# Patient Record
Sex: FEMALE | Race: WHITE | NOT HISPANIC OR LATINO
[De-identification: names, ages, dates, MRNs, and addresses within clinical notes are randomized per-mention and may not be internally consistent; named-entity substitution may affect disease eponyms.]

---

## 2017-01-03 ENCOUNTER — RESULT REVIEW (OUTPATIENT)
Age: 82
End: 2017-01-03

## 2017-01-04 ENCOUNTER — APPOINTMENT (OUTPATIENT)
Dept: HEMATOLOGY ONCOLOGY | Facility: CLINIC | Age: 82
End: 2017-01-04

## 2017-01-04 VITALS
SYSTOLIC BLOOD PRESSURE: 129 MMHG | WEIGHT: 237.65 LBS | TEMPERATURE: 97.7 F | HEART RATE: 81 BPM | DIASTOLIC BLOOD PRESSURE: 78 MMHG | OXYGEN SATURATION: 95 % | RESPIRATION RATE: 16 BRPM | BODY MASS INDEX: 43.47 KG/M2

## 2017-01-05 LAB
ALBUMIN SERPL ELPH-MCNC: 4 G/DL
ALP BLD-CCNC: 95 U/L
ALT SERPL-CCNC: 26 U/L
ANION GAP SERPL CALC-SCNC: 17 MMOL/L
AST SERPL-CCNC: 37 U/L
BILIRUB SERPL-MCNC: 0.4 MG/DL
BUN SERPL-MCNC: 16 MG/DL
CALCIUM SERPL-MCNC: 10.2 MG/DL
CANCER AG125 SERPL-ACNC: 22 U/ML
CHLORIDE SERPL-SCNC: 99 MMOL/L
CO2 SERPL-SCNC: 26 MMOL/L
CREAT SERPL-MCNC: 0.9 MG/DL
GLUCOSE SERPL-MCNC: 105 MG/DL
POTASSIUM SERPL-SCNC: 4.5 MMOL/L
PROT SERPL-MCNC: 7.3 G/DL
SODIUM SERPL-SCNC: 142 MMOL/L

## 2017-01-06 LAB — CEA SERPL-MCNC: 1.6 NG/ML

## 2017-03-07 ENCOUNTER — OUTPATIENT (OUTPATIENT)
Dept: OUTPATIENT SERVICES | Facility: HOSPITAL | Age: 82
LOS: 1 days | Discharge: HOME | End: 2017-03-07

## 2017-03-07 DIAGNOSIS — C54.1 MALIGNANT NEOPLASM OF ENDOMETRIUM: ICD-10-CM

## 2017-03-08 ENCOUNTER — RESULT REVIEW (OUTPATIENT)
Age: 82
End: 2017-03-08

## 2017-03-09 ENCOUNTER — APPOINTMENT (OUTPATIENT)
Dept: HEMATOLOGY ONCOLOGY | Facility: CLINIC | Age: 82
End: 2017-03-09

## 2017-03-09 VITALS
BODY MASS INDEX: 43.95 KG/M2 | WEIGHT: 240.3 LBS | HEART RATE: 75 BPM | SYSTOLIC BLOOD PRESSURE: 140 MMHG | DIASTOLIC BLOOD PRESSURE: 78 MMHG | OXYGEN SATURATION: 97 % | RESPIRATION RATE: 17 BRPM | TEMPERATURE: 97.6 F

## 2017-03-09 LAB
ALBUMIN SERPL ELPH-MCNC: 4.1 G/DL
ALP BLD-CCNC: 91 U/L
ALT SERPL-CCNC: 27 U/L
ANION GAP SERPL CALC-SCNC: 14 MMOL/L
AST SERPL-CCNC: 25 U/L
BILIRUB SERPL-MCNC: 0.3 MG/DL
BUN SERPL-MCNC: 20 MG/DL
CALCIUM SERPL-MCNC: 10.3 MG/DL
CHLORIDE SERPL-SCNC: 103 MMOL/L
CO2 SERPL-SCNC: 24 MMOL/L
CREAT SERPL-MCNC: 0.94 MG/DL
GLUCOSE SERPL-MCNC: 121 MG/DL
HCT VFR BLD CALC: 40.8 % — SIGNIFICANT CHANGE UP (ref 34.5–45)
HGB BLD-MCNC: 13.7 G/DL — SIGNIFICANT CHANGE UP (ref 11.5–15.5)
MCHC RBC-ENTMCNC: 30.8 PG — SIGNIFICANT CHANGE UP (ref 27–34)
MCHC RBC-ENTMCNC: 33.5 G/DL — SIGNIFICANT CHANGE UP (ref 32–36)
MCV RBC AUTO: 91.9 FL — SIGNIFICANT CHANGE UP (ref 80–100)
PLATELET # BLD AUTO: 279 K/UL — SIGNIFICANT CHANGE UP (ref 150–400)
POTASSIUM SERPL-SCNC: 4.5 MMOL/L
PROT SERPL-MCNC: 7.1 G/DL
RBC # BLD: 4.44 M/UL — SIGNIFICANT CHANGE UP (ref 3.8–5.2)
RBC # FLD: 12.3 % — SIGNIFICANT CHANGE UP (ref 10.3–14.5)
SODIUM SERPL-SCNC: 142 MMOL/L
WBC # BLD: 6.7 K/UL — SIGNIFICANT CHANGE UP (ref 3.8–10.5)
WBC # FLD AUTO: 6.7 K/UL — SIGNIFICANT CHANGE UP (ref 3.8–10.5)

## 2017-03-10 LAB
CANCER AG125 SERPL-ACNC: 14 U/ML
CEA SERPL-MCNC: 1.6 NG/ML

## 2017-03-20 ENCOUNTER — APPOINTMENT (OUTPATIENT)
Dept: THORACIC SURGERY | Facility: CLINIC | Age: 82
End: 2017-03-20

## 2017-03-28 ENCOUNTER — APPOINTMENT (OUTPATIENT)
Dept: GYNECOLOGIC ONCOLOGY | Facility: CLINIC | Age: 82
End: 2017-03-28

## 2017-03-30 ENCOUNTER — RX RENEWAL (OUTPATIENT)
Age: 82
End: 2017-03-30

## 2017-04-04 ENCOUNTER — APPOINTMENT (OUTPATIENT)
Dept: CT IMAGING | Facility: IMAGING CENTER | Age: 82
End: 2017-04-04

## 2017-04-10 ENCOUNTER — FORM ENCOUNTER (OUTPATIENT)
Age: 82
End: 2017-04-10

## 2017-04-11 ENCOUNTER — OUTPATIENT (OUTPATIENT)
Dept: OUTPATIENT SERVICES | Facility: HOSPITAL | Age: 82
LOS: 1 days | End: 2017-04-11
Payer: MEDICARE

## 2017-04-11 ENCOUNTER — APPOINTMENT (OUTPATIENT)
Dept: CT IMAGING | Facility: IMAGING CENTER | Age: 82
End: 2017-04-11

## 2017-04-11 DIAGNOSIS — Z00.8 ENCOUNTER FOR OTHER GENERAL EXAMINATION: ICD-10-CM

## 2017-04-11 PROCEDURE — 74177 CT ABD & PELVIS W/CONTRAST: CPT

## 2017-04-11 PROCEDURE — 71260 CT THORAX DX C+: CPT

## 2017-04-12 ENCOUNTER — CLINICAL ADVICE (OUTPATIENT)
Age: 82
End: 2017-04-12

## 2017-05-10 ENCOUNTER — APPOINTMENT (OUTPATIENT)
Dept: THORACIC SURGERY | Facility: CLINIC | Age: 82
End: 2017-05-10

## 2017-05-11 VITALS
BODY MASS INDEX: 43.9 KG/M2 | RESPIRATION RATE: 16 BRPM | DIASTOLIC BLOOD PRESSURE: 68 MMHG | OXYGEN SATURATION: 98 % | HEART RATE: 82 BPM | SYSTOLIC BLOOD PRESSURE: 120 MMHG | WEIGHT: 240 LBS

## 2017-06-08 ENCOUNTER — APPOINTMENT (OUTPATIENT)
Dept: NEUROLOGY | Facility: CLINIC | Age: 82
End: 2017-06-08

## 2017-06-08 VITALS
SYSTOLIC BLOOD PRESSURE: 149 MMHG | DIASTOLIC BLOOD PRESSURE: 82 MMHG | WEIGHT: 240 LBS | HEART RATE: 88 BPM | BODY MASS INDEX: 44.16 KG/M2 | HEIGHT: 62 IN

## 2017-06-08 RX ORDER — FLUTICASONE PROPIONATE 50 UG/1
50 SPRAY, METERED NASAL
Qty: 16 | Refills: 0 | Status: ACTIVE | COMMUNITY
Start: 2017-01-26

## 2017-06-08 RX ORDER — MONTELUKAST 10 MG/1
10 TABLET, FILM COATED ORAL
Qty: 30 | Refills: 0 | Status: ACTIVE | COMMUNITY
Start: 2017-01-12

## 2017-06-08 RX ORDER — CHOLECALCIFEROL (VITAMIN D3) 50 MCG
50 MCG TABLET ORAL
Qty: 90 | Refills: 0 | Status: ACTIVE | COMMUNITY
Start: 2017-02-02

## 2017-06-08 RX ORDER — ZOLPIDEM TARTRATE 10 MG/1
10 TABLET ORAL
Qty: 30 | Refills: 0 | Status: ACTIVE | COMMUNITY
Start: 2017-01-26

## 2017-06-08 RX ORDER — AZITHROMYCIN 500 MG/1
500 TABLET, FILM COATED ORAL
Qty: 3 | Refills: 0 | Status: ACTIVE | COMMUNITY
Start: 2017-01-12

## 2017-06-12 ENCOUNTER — OUTPATIENT (OUTPATIENT)
Dept: OUTPATIENT SERVICES | Facility: HOSPITAL | Age: 82
LOS: 1 days | Discharge: HOME | End: 2017-06-12

## 2017-06-12 DIAGNOSIS — C54.1 MALIGNANT NEOPLASM OF ENDOMETRIUM: ICD-10-CM

## 2017-06-13 ENCOUNTER — APPOINTMENT (OUTPATIENT)
Dept: GYNECOLOGIC ONCOLOGY | Facility: CLINIC | Age: 82
End: 2017-06-13

## 2017-06-13 ENCOUNTER — APPOINTMENT (OUTPATIENT)
Dept: HEMATOLOGY ONCOLOGY | Facility: CLINIC | Age: 82
End: 2017-06-13

## 2017-06-13 ENCOUNTER — RESULT REVIEW (OUTPATIENT)
Age: 82
End: 2017-06-13

## 2017-06-13 VITALS
WEIGHT: 240 LBS | BODY MASS INDEX: 43.9 KG/M2 | SYSTOLIC BLOOD PRESSURE: 129 MMHG | RESPIRATION RATE: 16 BRPM | OXYGEN SATURATION: 96 % | DIASTOLIC BLOOD PRESSURE: 83 MMHG | TEMPERATURE: 97.5 F | HEART RATE: 82 BPM

## 2017-06-13 LAB
HCT VFR BLD CALC: 41.2 % — SIGNIFICANT CHANGE UP (ref 34.5–45)
HGB BLD-MCNC: 13.6 G/DL — SIGNIFICANT CHANGE UP (ref 11.5–15.5)
MCHC RBC-ENTMCNC: 30.4 PG — SIGNIFICANT CHANGE UP (ref 27–34)
MCHC RBC-ENTMCNC: 32.9 G/DL — SIGNIFICANT CHANGE UP (ref 32–36)
MCV RBC AUTO: 92.4 FL — SIGNIFICANT CHANGE UP (ref 80–100)
PLATELET # BLD AUTO: 271 K/UL — SIGNIFICANT CHANGE UP (ref 150–400)
RBC # BLD: 4.46 M/UL — SIGNIFICANT CHANGE UP (ref 3.8–5.2)
RBC # FLD: 12 % — SIGNIFICANT CHANGE UP (ref 10.3–14.5)
WBC # BLD: 7.8 K/UL — SIGNIFICANT CHANGE UP (ref 3.8–10.5)
WBC # FLD AUTO: 7.8 K/UL — SIGNIFICANT CHANGE UP (ref 3.8–10.5)

## 2017-06-14 LAB
ALBUMIN SERPL ELPH-MCNC: 4.1 G/DL
ALP BLD-CCNC: 96 U/L
ALT SERPL-CCNC: 29 U/L
ANION GAP SERPL CALC-SCNC: 21 MMOL/L
AST SERPL-CCNC: 30 U/L
BILIRUB SERPL-MCNC: 0.2 MG/DL
BUN SERPL-MCNC: 16 MG/DL
CALCIUM SERPL-MCNC: 10.2 MG/DL
CANCER AG125 SERPL-ACNC: 14 U/ML
CEA SERPL-MCNC: 1.8 NG/ML
CHLORIDE SERPL-SCNC: 104 MMOL/L
CO2 SERPL-SCNC: 21 MMOL/L
CREAT SERPL-MCNC: 0.94 MG/DL
GLUCOSE SERPL-MCNC: 110 MG/DL
POTASSIUM SERPL-SCNC: 4.5 MMOL/L
PROT SERPL-MCNC: 7.2 G/DL
SODIUM SERPL-SCNC: 146 MMOL/L

## 2017-07-06 ENCOUNTER — APPOINTMENT (OUTPATIENT)
Dept: NEUROLOGY | Facility: CLINIC | Age: 82
End: 2017-07-06

## 2017-07-18 ENCOUNTER — OUTPATIENT (OUTPATIENT)
Dept: OUTPATIENT SERVICES | Facility: HOSPITAL | Age: 82
LOS: 1 days | End: 2017-07-18
Payer: MEDICARE

## 2017-07-18 ENCOUNTER — APPOINTMENT (OUTPATIENT)
Dept: CT IMAGING | Facility: IMAGING CENTER | Age: 82
End: 2017-07-18

## 2017-07-18 DIAGNOSIS — Z00.8 ENCOUNTER FOR OTHER GENERAL EXAMINATION: ICD-10-CM

## 2017-07-18 PROCEDURE — 71260 CT THORAX DX C+: CPT

## 2017-07-18 PROCEDURE — 74177 CT ABD & PELVIS W/CONTRAST: CPT

## 2017-08-24 ENCOUNTER — OUTPATIENT (OUTPATIENT)
Dept: OUTPATIENT SERVICES | Facility: HOSPITAL | Age: 82
LOS: 1 days | Discharge: HOME | End: 2017-08-24

## 2017-08-24 DIAGNOSIS — C54.1 MALIGNANT NEOPLASM OF ENDOMETRIUM: ICD-10-CM

## 2017-08-31 ENCOUNTER — APPOINTMENT (OUTPATIENT)
Dept: HEMATOLOGY ONCOLOGY | Facility: CLINIC | Age: 82
End: 2017-08-31
Payer: MEDICARE

## 2017-08-31 ENCOUNTER — RESULT REVIEW (OUTPATIENT)
Age: 82
End: 2017-08-31

## 2017-08-31 VITALS
OXYGEN SATURATION: 95 % | TEMPERATURE: 97.7 F | HEART RATE: 88 BPM | BODY MASS INDEX: 46.17 KG/M2 | RESPIRATION RATE: 16 BRPM | WEIGHT: 252.43 LBS | DIASTOLIC BLOOD PRESSURE: 83 MMHG | SYSTOLIC BLOOD PRESSURE: 153 MMHG

## 2017-08-31 LAB
HCT VFR BLD CALC: 42.8 % — SIGNIFICANT CHANGE UP (ref 34.5–45)
HGB BLD-MCNC: 14.4 G/DL — SIGNIFICANT CHANGE UP (ref 11.5–15.5)
MCHC RBC-ENTMCNC: 31 PG — SIGNIFICANT CHANGE UP (ref 27–34)
MCHC RBC-ENTMCNC: 33.7 G/DL — SIGNIFICANT CHANGE UP (ref 32–36)
MCV RBC AUTO: 91.8 FL — SIGNIFICANT CHANGE UP (ref 80–100)
PLATELET # BLD AUTO: 266 K/UL — SIGNIFICANT CHANGE UP (ref 150–400)
RBC # BLD: 4.66 M/UL — SIGNIFICANT CHANGE UP (ref 3.8–5.2)
RBC # FLD: 12.3 % — SIGNIFICANT CHANGE UP (ref 10.3–14.5)
WBC # BLD: 8.3 K/UL — SIGNIFICANT CHANGE UP (ref 3.8–10.5)
WBC # FLD AUTO: 8.3 K/UL — SIGNIFICANT CHANGE UP (ref 3.8–10.5)

## 2017-08-31 PROCEDURE — 99214 OFFICE O/P EST MOD 30 MIN: CPT

## 2017-09-01 LAB
ALBUMIN SERPL ELPH-MCNC: 4.2 G/DL
ALP BLD-CCNC: 104 U/L
ALT SERPL-CCNC: 36 U/L
ANION GAP SERPL CALC-SCNC: 16 MMOL/L
AST SERPL-CCNC: 38 U/L
BILIRUB SERPL-MCNC: 0.3 MG/DL
BUN SERPL-MCNC: 16 MG/DL
CALCIUM SERPL-MCNC: 9.9 MG/DL
CANCER AG125 SERPL-ACNC: 15 U/ML
CEA SERPL-MCNC: 1.8 NG/ML
CHLORIDE SERPL-SCNC: 101 MMOL/L
CO2 SERPL-SCNC: 28 MMOL/L
CREAT SERPL-MCNC: 0.85 MG/DL
GLUCOSE SERPL-MCNC: 107 MG/DL
POTASSIUM SERPL-SCNC: 4.2 MMOL/L
PROT SERPL-MCNC: 7.8 G/DL
SODIUM SERPL-SCNC: 145 MMOL/L

## 2017-09-28 ENCOUNTER — APPOINTMENT (OUTPATIENT)
Dept: NEUROLOGY | Facility: CLINIC | Age: 82
End: 2017-09-28
Payer: MEDICARE

## 2017-09-28 VITALS
SYSTOLIC BLOOD PRESSURE: 193 MMHG | DIASTOLIC BLOOD PRESSURE: 78 MMHG | BODY MASS INDEX: 46.56 KG/M2 | WEIGHT: 253 LBS | HEIGHT: 62 IN | HEART RATE: 96 BPM

## 2017-09-28 PROCEDURE — 99214 OFFICE O/P EST MOD 30 MIN: CPT

## 2017-09-28 RX ORDER — PANTOPRAZOLE 40 MG/1
40 TABLET, DELAYED RELEASE ORAL
Qty: 30 | Refills: 0 | Status: ACTIVE | COMMUNITY
Start: 2017-09-19

## 2017-10-23 ENCOUNTER — OTHER (OUTPATIENT)
Age: 82
End: 2017-10-23

## 2017-10-31 ENCOUNTER — APPOINTMENT (OUTPATIENT)
Dept: CT IMAGING | Facility: IMAGING CENTER | Age: 82
End: 2017-10-31

## 2017-11-07 RX ORDER — DICLOFENAC SODIUM 75 MG/1
75 TABLET, DELAYED RELEASE ORAL
Qty: 90 | Refills: 0 | Status: ACTIVE | COMMUNITY
Start: 2017-10-24 | End: 1900-01-01

## 2017-11-10 ENCOUNTER — APPOINTMENT (OUTPATIENT)
Dept: GYNECOLOGIC ONCOLOGY | Facility: CLINIC | Age: 82
End: 2017-11-10

## 2017-11-10 ENCOUNTER — FORM ENCOUNTER (OUTPATIENT)
Age: 82
End: 2017-11-10

## 2017-11-11 ENCOUNTER — APPOINTMENT (OUTPATIENT)
Dept: MRI IMAGING | Facility: IMAGING CENTER | Age: 82
End: 2017-11-11
Payer: MEDICARE

## 2017-11-11 ENCOUNTER — OUTPATIENT (OUTPATIENT)
Dept: OUTPATIENT SERVICES | Facility: HOSPITAL | Age: 82
LOS: 1 days | End: 2017-11-11
Payer: MEDICARE

## 2017-11-11 DIAGNOSIS — M54.5 LOW BACK PAIN: ICD-10-CM

## 2017-11-11 DIAGNOSIS — R26.9 UNSPECIFIED ABNORMALITIES OF GAIT AND MOBILITY: ICD-10-CM

## 2017-11-11 PROCEDURE — 72148 MRI LUMBAR SPINE W/O DYE: CPT | Mod: 26

## 2017-11-11 PROCEDURE — 72148 MRI LUMBAR SPINE W/O DYE: CPT

## 2017-11-11 PROCEDURE — A9585: CPT

## 2017-11-21 ENCOUNTER — APPOINTMENT (OUTPATIENT)
Dept: CT IMAGING | Facility: IMAGING CENTER | Age: 82
End: 2017-11-21
Payer: MEDICARE

## 2017-11-21 ENCOUNTER — OUTPATIENT (OUTPATIENT)
Dept: OUTPATIENT SERVICES | Facility: HOSPITAL | Age: 82
LOS: 1 days | Discharge: HOME | End: 2017-11-21

## 2017-11-21 DIAGNOSIS — C54.1 MALIGNANT NEOPLASM OF ENDOMETRIUM: ICD-10-CM

## 2017-11-28 ENCOUNTER — APPOINTMENT (OUTPATIENT)
Dept: HEMATOLOGY ONCOLOGY | Facility: CLINIC | Age: 82
End: 2017-11-28

## 2017-11-29 ENCOUNTER — FORM ENCOUNTER (OUTPATIENT)
Age: 82
End: 2017-11-29

## 2017-11-30 ENCOUNTER — OUTPATIENT (OUTPATIENT)
Dept: OUTPATIENT SERVICES | Facility: HOSPITAL | Age: 82
LOS: 1 days | End: 2017-11-30
Payer: MEDICARE

## 2017-11-30 ENCOUNTER — APPOINTMENT (OUTPATIENT)
Dept: CT IMAGING | Facility: IMAGING CENTER | Age: 82
End: 2017-11-30
Payer: MEDICARE

## 2017-11-30 DIAGNOSIS — C78.02 SECONDARY MALIGNANT NEOPLASM OF LEFT LUNG: ICD-10-CM

## 2017-11-30 PROCEDURE — 74177 CT ABD & PELVIS W/CONTRAST: CPT

## 2017-11-30 PROCEDURE — 74177 CT ABD & PELVIS W/CONTRAST: CPT | Mod: 26

## 2017-11-30 PROCEDURE — 71260 CT THORAX DX C+: CPT | Mod: 26

## 2017-11-30 PROCEDURE — 71260 CT THORAX DX C+: CPT

## 2017-12-05 ENCOUNTER — APPOINTMENT (OUTPATIENT)
Dept: HEMATOLOGY ONCOLOGY | Facility: CLINIC | Age: 82
End: 2017-12-05
Payer: MEDICARE

## 2017-12-05 ENCOUNTER — RESULT REVIEW (OUTPATIENT)
Age: 82
End: 2017-12-05

## 2017-12-05 VITALS
TEMPERATURE: 98.2 F | HEART RATE: 89 BPM | RESPIRATION RATE: 16 BRPM | DIASTOLIC BLOOD PRESSURE: 82 MMHG | SYSTOLIC BLOOD PRESSURE: 137 MMHG | OXYGEN SATURATION: 95 %

## 2017-12-05 DIAGNOSIS — I25.10 ATHEROSCLEROTIC HEART DISEASE OF NATIVE CORONARY ARTERY WITHOUT ANGINA PECTORIS: ICD-10-CM

## 2017-12-05 DIAGNOSIS — C54.1 MALIGNANT NEOPLASM OF ENDOMETRIUM: ICD-10-CM

## 2017-12-05 LAB
HCT VFR BLD CALC: 41.6 % — SIGNIFICANT CHANGE UP (ref 34.5–45)
HGB BLD-MCNC: 14.2 G/DL — SIGNIFICANT CHANGE UP (ref 11.5–15.5)
MCHC RBC-ENTMCNC: 31 PG — SIGNIFICANT CHANGE UP (ref 27–34)
MCHC RBC-ENTMCNC: 34.1 G/DL — SIGNIFICANT CHANGE UP (ref 32–36)
MCV RBC AUTO: 91.1 FL — SIGNIFICANT CHANGE UP (ref 80–100)
PLATELET # BLD AUTO: 254 K/UL — SIGNIFICANT CHANGE UP (ref 150–400)
RBC # BLD: 4.57 M/UL — SIGNIFICANT CHANGE UP (ref 3.8–5.2)
RBC # FLD: 12.4 % — SIGNIFICANT CHANGE UP (ref 10.3–14.5)
WBC # BLD: 8.5 K/UL — SIGNIFICANT CHANGE UP (ref 3.8–10.5)
WBC # FLD AUTO: 8.5 K/UL — SIGNIFICANT CHANGE UP (ref 3.8–10.5)

## 2017-12-05 PROCEDURE — 99214 OFFICE O/P EST MOD 30 MIN: CPT

## 2017-12-06 LAB
ALBUMIN SERPL ELPH-MCNC: 3.8 G/DL
ALP BLD-CCNC: 94 U/L
ALT SERPL-CCNC: 24 U/L
ANION GAP SERPL CALC-SCNC: 15 MMOL/L
AST SERPL-CCNC: 21 U/L
BILIRUB SERPL-MCNC: 0.2 MG/DL
BUN SERPL-MCNC: 19 MG/DL
CALCIUM SERPL-MCNC: 9.9 MG/DL
CANCER AG125 SERPL-ACNC: 15 U/ML
CEA SERPL-MCNC: 2.1 NG/ML
CHLORIDE SERPL-SCNC: 101 MMOL/L
CO2 SERPL-SCNC: 22 MMOL/L
CREAT SERPL-MCNC: 0.98 MG/DL
GLUCOSE SERPL-MCNC: 141 MG/DL
POTASSIUM SERPL-SCNC: 4.2 MMOL/L
PROT SERPL-MCNC: 7.2 G/DL
SODIUM SERPL-SCNC: 138 MMOL/L

## 2018-01-17 ENCOUNTER — APPOINTMENT (OUTPATIENT)
Dept: NEUROLOGY | Facility: CLINIC | Age: 83
End: 2018-01-17

## 2018-02-09 ENCOUNTER — APPOINTMENT (OUTPATIENT)
Dept: NEUROLOGY | Facility: CLINIC | Age: 83
End: 2018-02-09
Payer: MEDICARE

## 2018-02-09 VITALS
WEIGHT: 240 LBS | DIASTOLIC BLOOD PRESSURE: 76 MMHG | HEART RATE: 88 BPM | HEIGHT: 62 IN | SYSTOLIC BLOOD PRESSURE: 136 MMHG | BODY MASS INDEX: 44.16 KG/M2

## 2018-02-09 DIAGNOSIS — F39 UNSPECIFIED MOOD [AFFECTIVE] DISORDER: ICD-10-CM

## 2018-02-09 PROCEDURE — 99214 OFFICE O/P EST MOD 30 MIN: CPT | Mod: GC

## 2018-02-23 ENCOUNTER — RX RENEWAL (OUTPATIENT)
Age: 83
End: 2018-02-23

## 2018-02-23 RX ORDER — NIACIN (INOSITOL NIACINATE) 400(500MG)
100 CAPSULE ORAL TWICE DAILY
Qty: 60 | Refills: 2 | Status: ACTIVE | COMMUNITY
Start: 2018-02-23 | End: 1900-01-01

## 2018-03-13 ENCOUNTER — OUTPATIENT (OUTPATIENT)
Dept: OUTPATIENT SERVICES | Facility: HOSPITAL | Age: 83
LOS: 1 days | Discharge: HOME | End: 2018-03-13

## 2018-03-13 DIAGNOSIS — C54.1 MALIGNANT NEOPLASM OF ENDOMETRIUM: ICD-10-CM

## 2018-03-14 ENCOUNTER — APPOINTMENT (OUTPATIENT)
Dept: HEMATOLOGY ONCOLOGY | Facility: CLINIC | Age: 83
End: 2018-03-14

## 2018-03-28 ENCOUNTER — FORM ENCOUNTER (OUTPATIENT)
Age: 83
End: 2018-03-28

## 2018-03-29 ENCOUNTER — OUTPATIENT (OUTPATIENT)
Dept: OUTPATIENT SERVICES | Facility: HOSPITAL | Age: 83
LOS: 1 days | End: 2018-03-29
Payer: MEDICARE

## 2018-03-29 ENCOUNTER — APPOINTMENT (OUTPATIENT)
Dept: CT IMAGING | Facility: IMAGING CENTER | Age: 83
End: 2018-03-29
Payer: MEDICARE

## 2018-03-29 DIAGNOSIS — C54.1 MALIGNANT NEOPLASM OF ENDOMETRIUM: ICD-10-CM

## 2018-03-29 PROCEDURE — 74177 CT ABD & PELVIS W/CONTRAST: CPT

## 2018-03-29 PROCEDURE — 82565 ASSAY OF CREATININE: CPT

## 2018-03-29 PROCEDURE — 74177 CT ABD & PELVIS W/CONTRAST: CPT | Mod: 26

## 2018-03-29 PROCEDURE — 71260 CT THORAX DX C+: CPT

## 2018-03-29 PROCEDURE — 71260 CT THORAX DX C+: CPT | Mod: 26

## 2018-04-17 ENCOUNTER — OUTPATIENT (OUTPATIENT)
Dept: OUTPATIENT SERVICES | Facility: HOSPITAL | Age: 83
LOS: 1 days | Discharge: HOME | End: 2018-04-17

## 2018-04-17 DIAGNOSIS — C54.1 MALIGNANT NEOPLASM OF ENDOMETRIUM: ICD-10-CM

## 2018-04-20 ENCOUNTER — APPOINTMENT (OUTPATIENT)
Dept: HEMATOLOGY ONCOLOGY | Facility: CLINIC | Age: 83
End: 2018-04-20

## 2018-05-08 ENCOUNTER — RESULT REVIEW (OUTPATIENT)
Age: 83
End: 2018-05-08

## 2018-05-08 ENCOUNTER — APPOINTMENT (OUTPATIENT)
Dept: HEMATOLOGY ONCOLOGY | Facility: CLINIC | Age: 83
End: 2018-05-08
Payer: MEDICARE

## 2018-05-08 VITALS
TEMPERATURE: 98.2 F | OXYGEN SATURATION: 96 % | SYSTOLIC BLOOD PRESSURE: 189 MMHG | WEIGHT: 235 LBS | RESPIRATION RATE: 16 BRPM | BODY MASS INDEX: 42.98 KG/M2 | DIASTOLIC BLOOD PRESSURE: 73 MMHG | HEART RATE: 92 BPM

## 2018-05-08 LAB
HCT VFR BLD CALC: 41 % — SIGNIFICANT CHANGE UP (ref 34.5–45)
HGB BLD-MCNC: 14 G/DL — SIGNIFICANT CHANGE UP (ref 11.5–15.5)
MCHC RBC-ENTMCNC: 31.4 PG — SIGNIFICANT CHANGE UP (ref 27–34)
MCHC RBC-ENTMCNC: 34.1 G/DL — SIGNIFICANT CHANGE UP (ref 32–36)
MCV RBC AUTO: 92.2 FL — SIGNIFICANT CHANGE UP (ref 80–100)
PLATELET # BLD AUTO: 262 K/UL — SIGNIFICANT CHANGE UP (ref 150–400)
RBC # BLD: 4.45 M/UL — SIGNIFICANT CHANGE UP (ref 3.8–5.2)
RBC # FLD: 12.3 % — SIGNIFICANT CHANGE UP (ref 10.3–14.5)
WBC # BLD: 7.6 K/UL — SIGNIFICANT CHANGE UP (ref 3.8–10.5)
WBC # FLD AUTO: 7.6 K/UL — SIGNIFICANT CHANGE UP (ref 3.8–10.5)

## 2018-05-08 PROCEDURE — 99215 OFFICE O/P EST HI 40 MIN: CPT

## 2018-05-18 LAB
ALBUMIN SERPL ELPH-MCNC: 4.1 G/DL
ALP BLD-CCNC: 100 U/L
ALT SERPL-CCNC: 23 U/L
ANION GAP SERPL CALC-SCNC: 16 MMOL/L
AST SERPL-CCNC: 19 U/L
BILIRUB SERPL-MCNC: 0.3 MG/DL
BUN SERPL-MCNC: 21 MG/DL
CALCIUM SERPL-MCNC: 10.4 MG/DL
CANCER AG125 SERPL-ACNC: 14 U/ML
CEA SERPL-MCNC: 2.1 NG/ML
CHLORIDE SERPL-SCNC: 101 MMOL/L
CO2 SERPL-SCNC: 26 MMOL/L
CREAT SERPL-MCNC: 0.96 MG/DL
GLUCOSE SERPL-MCNC: 101 MG/DL
POTASSIUM SERPL-SCNC: 4.3 MMOL/L
PROT SERPL-MCNC: 7.3 G/DL
SODIUM SERPL-SCNC: 143 MMOL/L

## 2018-07-03 ENCOUNTER — RX RENEWAL (OUTPATIENT)
Age: 83
End: 2018-07-03

## 2018-10-04 ENCOUNTER — APPOINTMENT (OUTPATIENT)
Dept: NEUROLOGY | Facility: CLINIC | Age: 83
End: 2018-10-04
Payer: MEDICARE

## 2018-10-04 DIAGNOSIS — M79.89 OTHER SPECIFIED SOFT TISSUE DISORDERS: ICD-10-CM

## 2018-10-04 PROCEDURE — 99214 OFFICE O/P EST MOD 30 MIN: CPT

## 2018-10-04 RX ORDER — CALCIUM CARBONATE/VITAMIN D3 600MG-62.5
300-333 CAPSULE ORAL
Qty: 30 | Refills: 3 | Status: ACTIVE | COMMUNITY
Start: 2018-10-04 | End: 1900-01-01

## 2018-10-04 RX ORDER — CHROMIUM PICOLINATE 200 MCG
200 CAPSULE ORAL
Qty: 60 | Refills: 3 | Status: ACTIVE | COMMUNITY
Start: 2017-09-28 | End: 1900-01-01

## 2018-10-04 RX ORDER — UBIDECARENONE 100 MG
100 CAPSULE ORAL
Qty: 30 | Refills: 3 | Status: ACTIVE | COMMUNITY
Start: 2017-09-28 | End: 1900-01-01

## 2018-11-13 RX ORDER — MELOXICAM 15 MG/1
15 TABLET ORAL
Qty: 30 | Refills: 0 | Status: ACTIVE | COMMUNITY
Start: 2018-10-04 | End: 1900-01-01

## 2018-11-19 ENCOUNTER — OUTPATIENT (OUTPATIENT)
Dept: OUTPATIENT SERVICES | Facility: HOSPITAL | Age: 83
LOS: 1 days | Discharge: HOME | End: 2018-11-19

## 2018-11-19 ENCOUNTER — FORM ENCOUNTER (OUTPATIENT)
Age: 83
End: 2018-11-19

## 2018-11-19 DIAGNOSIS — C54.1 MALIGNANT NEOPLASM OF ENDOMETRIUM: ICD-10-CM

## 2018-11-20 ENCOUNTER — OUTPATIENT (OUTPATIENT)
Dept: OUTPATIENT SERVICES | Facility: HOSPITAL | Age: 83
LOS: 1 days | End: 2018-11-20
Payer: MEDICARE

## 2018-11-20 ENCOUNTER — APPOINTMENT (OUTPATIENT)
Dept: CT IMAGING | Facility: IMAGING CENTER | Age: 83
End: 2018-11-20
Payer: MEDICARE

## 2018-11-20 DIAGNOSIS — C54.1 MALIGNANT NEOPLASM OF ENDOMETRIUM: ICD-10-CM

## 2018-11-20 PROCEDURE — 71260 CT THORAX DX C+: CPT

## 2018-11-20 PROCEDURE — 82565 ASSAY OF CREATININE: CPT

## 2018-11-20 PROCEDURE — 74177 CT ABD & PELVIS W/CONTRAST: CPT

## 2018-11-20 PROCEDURE — 74177 CT ABD & PELVIS W/CONTRAST: CPT | Mod: 26

## 2018-11-20 PROCEDURE — 71260 CT THORAX DX C+: CPT | Mod: 26

## 2018-12-13 ENCOUNTER — RESULT REVIEW (OUTPATIENT)
Age: 83
End: 2018-12-13

## 2018-12-13 ENCOUNTER — APPOINTMENT (OUTPATIENT)
Dept: HEMATOLOGY ONCOLOGY | Facility: CLINIC | Age: 83
End: 2018-12-13
Payer: MEDICARE

## 2018-12-13 VITALS
HEART RATE: 86 BPM | WEIGHT: 233.69 LBS | OXYGEN SATURATION: 94 % | DIASTOLIC BLOOD PRESSURE: 80 MMHG | BODY MASS INDEX: 42.74 KG/M2 | RESPIRATION RATE: 17 BRPM | SYSTOLIC BLOOD PRESSURE: 155 MMHG | TEMPERATURE: 97.6 F

## 2018-12-13 DIAGNOSIS — J44.9 CHRONIC OBSTRUCTIVE PULMONARY DISEASE, UNSPECIFIED: ICD-10-CM

## 2018-12-13 LAB
HCT VFR BLD CALC: 41 % — SIGNIFICANT CHANGE UP (ref 34.5–45)
HGB BLD-MCNC: 13.4 G/DL — SIGNIFICANT CHANGE UP (ref 11.5–15.5)
MCHC RBC-ENTMCNC: 30.3 PG — SIGNIFICANT CHANGE UP (ref 27–34)
MCHC RBC-ENTMCNC: 32.8 G/DL — SIGNIFICANT CHANGE UP (ref 32–36)
MCV RBC AUTO: 92.4 FL — SIGNIFICANT CHANGE UP (ref 80–100)
PLATELET # BLD AUTO: 265 K/UL — SIGNIFICANT CHANGE UP (ref 150–400)
RBC # BLD: 4.44 M/UL — SIGNIFICANT CHANGE UP (ref 3.8–5.2)
RBC # FLD: 12.4 % — SIGNIFICANT CHANGE UP (ref 10.3–14.5)
WBC # BLD: 7.2 K/UL — SIGNIFICANT CHANGE UP (ref 3.8–10.5)
WBC # FLD AUTO: 7.2 K/UL — SIGNIFICANT CHANGE UP (ref 3.8–10.5)

## 2018-12-13 PROCEDURE — 99214 OFFICE O/P EST MOD 30 MIN: CPT

## 2018-12-14 LAB
ALBUMIN SERPL ELPH-MCNC: 4 G/DL
ALP BLD-CCNC: 92 U/L
ALT SERPL-CCNC: 32 U/L
ANION GAP SERPL CALC-SCNC: 11 MMOL/L
AST SERPL-CCNC: 31 U/L
BILIRUB SERPL-MCNC: 0.3 MG/DL
BUN SERPL-MCNC: 12 MG/DL
CALCIUM SERPL-MCNC: 10.1 MG/DL
CANCER AG125 SERPL-ACNC: 14 U/ML
CEA SERPL-MCNC: 2 NG/ML
CHLORIDE SERPL-SCNC: 104 MMOL/L
CO2 SERPL-SCNC: 25 MMOL/L
CREAT SERPL-MCNC: 0.68 MG/DL
GLUCOSE SERPL-MCNC: 114 MG/DL
POTASSIUM SERPL-SCNC: 4.6 MMOL/L
PROT SERPL-MCNC: 6.6 G/DL
SODIUM SERPL-SCNC: 140 MMOL/L

## 2018-12-18 ENCOUNTER — APPOINTMENT (OUTPATIENT)
Dept: NEUROLOGY | Facility: CLINIC | Age: 83
End: 2018-12-18
Payer: MEDICARE

## 2018-12-18 VITALS
DIASTOLIC BLOOD PRESSURE: 89 MMHG | WEIGHT: 233 LBS | BODY MASS INDEX: 42.88 KG/M2 | SYSTOLIC BLOOD PRESSURE: 160 MMHG | HEIGHT: 62 IN | HEART RATE: 90 BPM

## 2018-12-18 DIAGNOSIS — R26.9 UNSPECIFIED ABNORMALITIES OF GAIT AND MOBILITY: ICD-10-CM

## 2018-12-18 DIAGNOSIS — M54.5 LOW BACK PAIN: ICD-10-CM

## 2018-12-18 DIAGNOSIS — G62.9 POLYNEUROPATHY, UNSPECIFIED: ICD-10-CM

## 2018-12-18 PROCEDURE — 99214 OFFICE O/P EST MOD 30 MIN: CPT

## 2019-01-14 ENCOUNTER — APPOINTMENT (OUTPATIENT)
Dept: VASCULAR SURGERY | Facility: CLINIC | Age: 84
End: 2019-01-14
Payer: MEDICARE

## 2019-01-14 VITALS
TEMPERATURE: 97.8 F | HEART RATE: 111 BPM | SYSTOLIC BLOOD PRESSURE: 163 MMHG | OXYGEN SATURATION: 94 % | WEIGHT: 235 LBS | DIASTOLIC BLOOD PRESSURE: 116 MMHG | BODY MASS INDEX: 43.24 KG/M2 | HEIGHT: 62 IN

## 2019-01-14 PROCEDURE — 93970 EXTREMITY STUDY: CPT

## 2019-01-14 PROCEDURE — 99203 OFFICE O/P NEW LOW 30 MIN: CPT

## 2019-01-14 PROCEDURE — 93922 UPR/L XTREMITY ART 2 LEVELS: CPT

## 2019-02-27 ENCOUNTER — RX RENEWAL (OUTPATIENT)
Age: 84
End: 2019-02-27

## 2019-03-04 ENCOUNTER — APPOINTMENT (OUTPATIENT)
Dept: VASCULAR SURGERY | Facility: CLINIC | Age: 84
End: 2019-03-04

## 2019-03-26 ENCOUNTER — APPOINTMENT (OUTPATIENT)
Dept: NEUROLOGY | Facility: CLINIC | Age: 84
End: 2019-03-26
Payer: MEDICARE

## 2019-03-26 PROCEDURE — 95885 MUSC TST DONE W/NERV TST LIM: CPT | Mod: 59

## 2019-03-26 PROCEDURE — 95909 NRV CNDJ TST 5-6 STUDIES: CPT

## 2019-03-26 PROCEDURE — 95886 MUSC TEST DONE W/N TEST COMP: CPT

## 2019-05-28 ENCOUNTER — APPOINTMENT (OUTPATIENT)
Dept: HEMATOLOGY ONCOLOGY | Facility: CLINIC | Age: 84
End: 2019-05-28

## 2019-05-29 ENCOUNTER — OUTPATIENT (OUTPATIENT)
Dept: OUTPATIENT SERVICES | Facility: HOSPITAL | Age: 84
LOS: 1 days | Discharge: HOME | End: 2019-05-29

## 2019-05-29 ENCOUNTER — APPOINTMENT (OUTPATIENT)
Dept: CT IMAGING | Facility: IMAGING CENTER | Age: 84
End: 2019-05-29

## 2019-05-29 DIAGNOSIS — C54.1 MALIGNANT NEOPLASM OF ENDOMETRIUM: ICD-10-CM

## 2019-06-04 ENCOUNTER — APPOINTMENT (OUTPATIENT)
Dept: HEMATOLOGY ONCOLOGY | Facility: CLINIC | Age: 84
End: 2019-06-04

## 2019-06-17 ENCOUNTER — APPOINTMENT (OUTPATIENT)
Dept: VASCULAR SURGERY | Facility: CLINIC | Age: 84
End: 2019-06-17

## 2019-07-10 ENCOUNTER — APPOINTMENT (OUTPATIENT)
Dept: CT IMAGING | Facility: IMAGING CENTER | Age: 84
End: 2019-07-10

## 2019-07-16 ENCOUNTER — APPOINTMENT (OUTPATIENT)
Dept: HEMATOLOGY ONCOLOGY | Facility: CLINIC | Age: 84
End: 2019-07-16

## 2019-08-27 ENCOUNTER — FORM ENCOUNTER (OUTPATIENT)
Age: 84
End: 2019-08-27

## 2019-08-28 ENCOUNTER — OUTPATIENT (OUTPATIENT)
Dept: OUTPATIENT SERVICES | Facility: HOSPITAL | Age: 84
LOS: 1 days | End: 2019-08-28
Payer: MEDICARE

## 2019-08-28 ENCOUNTER — OUTPATIENT (OUTPATIENT)
Dept: OUTPATIENT SERVICES | Facility: HOSPITAL | Age: 84
LOS: 1 days | Discharge: HOME | End: 2019-08-28

## 2019-08-28 ENCOUNTER — APPOINTMENT (OUTPATIENT)
Dept: CT IMAGING | Facility: IMAGING CENTER | Age: 84
End: 2019-08-28
Payer: MEDICARE

## 2019-08-28 DIAGNOSIS — C54.1 MALIGNANT NEOPLASM OF ENDOMETRIUM: ICD-10-CM

## 2019-08-28 PROCEDURE — 71260 CT THORAX DX C+: CPT | Mod: 26

## 2019-08-28 PROCEDURE — 74177 CT ABD & PELVIS W/CONTRAST: CPT | Mod: 26

## 2019-08-28 PROCEDURE — 82565 ASSAY OF CREATININE: CPT

## 2019-08-28 PROCEDURE — 74177 CT ABD & PELVIS W/CONTRAST: CPT

## 2019-08-28 PROCEDURE — 71260 CT THORAX DX C+: CPT

## 2019-08-30 ENCOUNTER — APPOINTMENT (OUTPATIENT)
Dept: HEMATOLOGY ONCOLOGY | Facility: CLINIC | Age: 84
End: 2019-08-30

## 2019-09-06 ENCOUNTER — RX RENEWAL (OUTPATIENT)
Age: 84
End: 2019-09-06

## 2019-11-29 ENCOUNTER — OUTPATIENT (OUTPATIENT)
Dept: OUTPATIENT SERVICES | Facility: HOSPITAL | Age: 84
LOS: 1 days | Discharge: HOME | End: 2019-11-29

## 2019-11-29 DIAGNOSIS — C54.1 MALIGNANT NEOPLASM OF ENDOMETRIUM: ICD-10-CM

## 2019-12-04 ENCOUNTER — RESULT REVIEW (OUTPATIENT)
Age: 84
End: 2019-12-04

## 2019-12-04 ENCOUNTER — APPOINTMENT (OUTPATIENT)
Dept: HEMATOLOGY ONCOLOGY | Facility: CLINIC | Age: 84
End: 2019-12-04
Payer: MEDICARE

## 2019-12-04 VITALS
WEIGHT: 234.99 LBS | RESPIRATION RATE: 16 BRPM | DIASTOLIC BLOOD PRESSURE: 79 MMHG | TEMPERATURE: 97.9 F | SYSTOLIC BLOOD PRESSURE: 136 MMHG | HEART RATE: 85 BPM | BODY MASS INDEX: 42.98 KG/M2 | OXYGEN SATURATION: 98 %

## 2019-12-04 LAB
HCT VFR BLD CALC: 42.7 % — SIGNIFICANT CHANGE UP (ref 34.5–45)
HGB BLD-MCNC: 14 G/DL — SIGNIFICANT CHANGE UP (ref 11.5–15.5)
MCHC RBC-ENTMCNC: 31.3 PG — SIGNIFICANT CHANGE UP (ref 27–34)
MCHC RBC-ENTMCNC: 32.9 G/DL — SIGNIFICANT CHANGE UP (ref 32–36)
MCV RBC AUTO: 95 FL — SIGNIFICANT CHANGE UP (ref 80–100)
PLATELET # BLD AUTO: 247 K/UL — SIGNIFICANT CHANGE UP (ref 150–400)
RBC # BLD: 4.49 M/UL — SIGNIFICANT CHANGE UP (ref 3.8–5.2)
RBC # FLD: 12.1 % — SIGNIFICANT CHANGE UP (ref 10.3–14.5)
WBC # BLD: 8.3 K/UL — SIGNIFICANT CHANGE UP (ref 3.8–10.5)
WBC # FLD AUTO: 8.3 K/UL — SIGNIFICANT CHANGE UP (ref 3.8–10.5)

## 2019-12-04 PROCEDURE — 99214 OFFICE O/P EST MOD 30 MIN: CPT

## 2019-12-05 LAB
ALBUMIN SERPL ELPH-MCNC: 3.8 G/DL
ALP BLD-CCNC: 88 U/L
ALT SERPL-CCNC: 18 U/L
ANION GAP SERPL CALC-SCNC: 17 MMOL/L
AST SERPL-CCNC: 22 U/L
BILIRUB SERPL-MCNC: 0.3 MG/DL
BUN SERPL-MCNC: 12 MG/DL
CALCIUM SERPL-MCNC: 10.1 MG/DL
CANCER AG125 SERPL-ACNC: 8 U/ML
CEA SERPL-MCNC: 1.6 NG/ML
CHLORIDE SERPL-SCNC: 102 MMOL/L
CO2 SERPL-SCNC: 24 MMOL/L
CREAT SERPL-MCNC: 0.71 MG/DL
GLUCOSE SERPL-MCNC: 107 MG/DL
POTASSIUM SERPL-SCNC: 4.2 MMOL/L
PROT SERPL-MCNC: 6.8 G/DL
SODIUM SERPL-SCNC: 143 MMOL/L

## 2019-12-05 NOTE — REVIEW OF SYSTEMS
[Negative] : Allergic/Immunologic [SOB on Exertion] : shortness of breath during exertion [Lower Ext Edema] : lower extremity edema

## 2019-12-10 NOTE — ASSESSMENT
[Palliative] : Goals of care discussed with patient: Palliative [Palliative Care Plan] : not applicable at this time [FreeTextEntry1] : 84 year old woman with EMCA on Anastrazole (every other day).\par Due for scans in February.\par SOB on exertion/leg edema - follow up with cardiology as scheduled.\par RTC 6 months.

## 2019-12-10 NOTE — PHYSICAL EXAM
[Ambulatory and capable of all self care but unable to carry out any work activities] : Status 2- Ambulatory and capable of all self care but unable to carry out any work activities. Up and about more than 50% of waking hours [Normal] : affect appropriate [de-identified] : decreased breath sound right lower chest, no wheezes/rhonchi [de-identified] : b/l 2+ pitting edema below knee

## 2019-12-10 NOTE — HISTORY OF PRESENT ILLNESS
[Disease: _____________________] : Disease: [unfilled] [AJCC Stage: ____] : AJCC Stage: [unfilled] [Treatment Protocol] : Treatment Protocol [IVB] : IVB [Cardiovascular] : Cardiovascular [Constitutional] : Constitutional [ENT] : ENT [Dermatologic] : Dermatologic [Endocrine] : Endocrine [Gastrointestinal] : Gastrointestinal [Genitourinary] : Genitourinary [Gynecologic] : Gynecologic [Infectious] : Infectious [Musculoskeletal] : Musculoskeletal [Neurologic] : Neurologic [Pain] : Pain [Pulmonary] : Pulmonary [Hematologic] : Hematologic [FreeTextEntry1] : Fara 8/2016 [de-identified] : 80y.o female with h/o EMCA diagnosed in 2009, s/p surgical staging followed by Carboplatin and Taxol and vaginal brachytherapy on .\par Patient was going for q6 month surveillance. In March,2016 patient developed cough, w/u showed single JENNIFER 4 cm mass. 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 C/o fatigue, denies any weight loss, bleed or loss of appetite. [de-identified] : Patient is here for follow up, complains of worsening of chronic exertional dypsnea, weakness, lower leg swelling.  She says she has an appointment with cardiology for this month at [place] for evaluation of these symptoms.  She denies fever, chills, chest pain, cough, abdominal pain, nausea, vomiting, change in bowel habits, bleeding.

## 2020-03-10 ENCOUNTER — APPOINTMENT (OUTPATIENT)
Dept: CT IMAGING | Facility: IMAGING CENTER | Age: 85
End: 2020-03-10

## 2020-03-19 DIAGNOSIS — E03.9 HYPOTHYROIDISM, UNSPECIFIED: ICD-10-CM

## 2020-04-07 ENCOUNTER — RX RENEWAL (OUTPATIENT)
Age: 85
End: 2020-04-07

## 2020-04-07 RX ORDER — ANASTROZOLE TABLETS 1 MG/1
1 TABLET ORAL DAILY
Qty: 90 | Refills: 1 | Status: ACTIVE | COMMUNITY
Start: 2017-03-28 | End: 1900-01-01

## 2020-06-01 ENCOUNTER — OUTPATIENT (OUTPATIENT)
Dept: OUTPATIENT SERVICES | Facility: HOSPITAL | Age: 85
LOS: 1 days | Discharge: HOME | End: 2020-06-01

## 2020-06-01 DIAGNOSIS — C54.1 MALIGNANT NEOPLASM OF ENDOMETRIUM: ICD-10-CM

## 2020-06-09 ENCOUNTER — LABORATORY RESULT (OUTPATIENT)
Age: 85
End: 2020-06-09

## 2020-06-09 ENCOUNTER — RESULT REVIEW (OUTPATIENT)
Age: 85
End: 2020-06-09

## 2020-06-09 ENCOUNTER — OUTPATIENT (OUTPATIENT)
Dept: OUTPATIENT SERVICES | Facility: HOSPITAL | Age: 85
LOS: 1 days | End: 2020-06-09
Payer: MEDICARE

## 2020-06-09 ENCOUNTER — APPOINTMENT (OUTPATIENT)
Dept: CT IMAGING | Facility: IMAGING CENTER | Age: 85
End: 2020-06-09
Payer: MEDICARE

## 2020-06-09 ENCOUNTER — APPOINTMENT (OUTPATIENT)
Dept: HEMATOLOGY ONCOLOGY | Facility: CLINIC | Age: 85
End: 2020-06-09

## 2020-06-09 DIAGNOSIS — C54.1 MALIGNANT NEOPLASM OF ENDOMETRIUM: ICD-10-CM

## 2020-06-09 LAB
BASOPHILS # BLD AUTO: 0.03 K/UL — SIGNIFICANT CHANGE UP (ref 0–0.2)
BASOPHILS NFR BLD AUTO: 0.4 % — SIGNIFICANT CHANGE UP (ref 0–2)
EOSINOPHIL # BLD AUTO: 0.17 K/UL — SIGNIFICANT CHANGE UP (ref 0–0.5)
EOSINOPHIL NFR BLD AUTO: 2.1 % — SIGNIFICANT CHANGE UP (ref 0–6)
HCT VFR BLD CALC: 42.5 % — SIGNIFICANT CHANGE UP (ref 34.5–45)
HGB BLD-MCNC: 13.7 G/DL — SIGNIFICANT CHANGE UP (ref 11.5–15.5)
IMM GRANULOCYTES NFR BLD AUTO: 0.5 % — SIGNIFICANT CHANGE UP (ref 0–1.5)
LYMPHOCYTES # BLD AUTO: 1.3 K/UL — SIGNIFICANT CHANGE UP (ref 1–3.3)
LYMPHOCYTES # BLD AUTO: 16.3 % — SIGNIFICANT CHANGE UP (ref 13–44)
MCHC RBC-ENTMCNC: 30.9 PG — SIGNIFICANT CHANGE UP (ref 27–34)
MCHC RBC-ENTMCNC: 32.2 GM/DL — SIGNIFICANT CHANGE UP (ref 32–36)
MCV RBC AUTO: 95.9 FL — SIGNIFICANT CHANGE UP (ref 80–100)
MONOCYTES # BLD AUTO: 0.82 K/UL — SIGNIFICANT CHANGE UP (ref 0–0.9)
MONOCYTES NFR BLD AUTO: 10.3 % — SIGNIFICANT CHANGE UP (ref 2–14)
NEUTROPHILS # BLD AUTO: 5.62 K/UL — SIGNIFICANT CHANGE UP (ref 1.8–7.4)
NEUTROPHILS NFR BLD AUTO: 70.4 % — SIGNIFICANT CHANGE UP (ref 43–77)
NRBC # BLD: 0 /100 WBCS — SIGNIFICANT CHANGE UP (ref 0–0)
PLATELET # BLD AUTO: 256 K/UL — SIGNIFICANT CHANGE UP (ref 150–400)
RBC # BLD: 4.43 M/UL — SIGNIFICANT CHANGE UP (ref 3.8–5.2)
RBC # FLD: 13.3 % — SIGNIFICANT CHANGE UP (ref 10.3–14.5)
WBC # BLD: 7.98 K/UL — SIGNIFICANT CHANGE UP (ref 3.8–10.5)
WBC # FLD AUTO: 7.98 K/UL — SIGNIFICANT CHANGE UP (ref 3.8–10.5)

## 2020-06-09 PROCEDURE — 74177 CT ABD & PELVIS W/CONTRAST: CPT | Mod: 26

## 2020-06-09 PROCEDURE — 74177 CT ABD & PELVIS W/CONTRAST: CPT

## 2020-06-09 PROCEDURE — 71260 CT THORAX DX C+: CPT | Mod: 26

## 2020-06-09 PROCEDURE — 82565 ASSAY OF CREATININE: CPT

## 2020-06-09 PROCEDURE — 71260 CT THORAX DX C+: CPT

## 2020-06-11 ENCOUNTER — RX RENEWAL (OUTPATIENT)
Age: 85
End: 2020-06-11

## 2020-06-11 RX ORDER — LEVOTHYROXINE SODIUM 0.05 MG/1
50 TABLET ORAL
Qty: 30 | Refills: 1 | Status: ACTIVE | COMMUNITY
Start: 2017-05-08 | End: 1900-01-01

## 2020-06-30 ENCOUNTER — OUTPATIENT (OUTPATIENT)
Dept: OUTPATIENT SERVICES | Facility: HOSPITAL | Age: 85
LOS: 1 days | Discharge: HOME | End: 2020-06-30

## 2020-06-30 DIAGNOSIS — C54.1 MALIGNANT NEOPLASM OF ENDOMETRIUM: ICD-10-CM

## 2020-07-07 ENCOUNTER — APPOINTMENT (OUTPATIENT)
Dept: HEMATOLOGY ONCOLOGY | Facility: CLINIC | Age: 85
End: 2020-07-07
Payer: MEDICARE

## 2020-07-07 PROCEDURE — 99441: CPT

## 2020-07-07 NOTE — REVIEW OF SYSTEMS
[Lower Ext Edema] : lower extremity edema [SOB on Exertion] : shortness of breath during exertion [Negative] : Allergic/Immunologic

## 2020-07-09 NOTE — HISTORY OF PRESENT ILLNESS
[Disease: _____________________] : Disease: [unfilled] [AJCC Stage: ____] : AJCC Stage: [unfilled] [Treatment Protocol] : Treatment Protocol [Home] : at home, [unfilled] , at the time of the visit. [Medical Office: ([facility])___] : at the medical office located in  [Verbal consent obtained from patient] : the patient, [unfilled] [de-identified] : 80y.o female with h/o EMCA diagnosed in 2009, s/p surgical staging followed by Carboplatin and Taxol and vaginal brachytherapy on .\par Patient was going for q6 month surveillance. In March,2016 patient developed cough, w/u showed single JENNIFER 4 cm mass. 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 C/o fatigue, denies any weight loss, bleed or loss of appetite. [FreeTextEntry1] : Fara 8/2016 [de-identified] : Scans done 6/9 - [facility].\par She c/o fatigue and exertional SOB, which is not new.\par She has some arthritic pain.

## 2020-08-10 ENCOUNTER — RX RENEWAL (OUTPATIENT)
Age: 85
End: 2020-08-10

## 2021-01-21 ENCOUNTER — OUTPATIENT (OUTPATIENT)
Dept: OUTPATIENT SERVICES | Facility: HOSPITAL | Age: 86
LOS: 1 days | Discharge: HOME | End: 2021-01-21

## 2021-01-21 DIAGNOSIS — C54.1 MALIGNANT NEOPLASM OF ENDOMETRIUM: ICD-10-CM

## 2021-01-26 ENCOUNTER — APPOINTMENT (OUTPATIENT)
Dept: HEMATOLOGY ONCOLOGY | Facility: CLINIC | Age: 86
End: 2021-01-26

## 2021-01-26 ENCOUNTER — EMERGENCY (EMERGENCY)
Facility: HOSPITAL | Age: 86
LOS: 1 days | Discharge: HOME | End: 2021-01-26
Attending: EMERGENCY MEDICINE
Payer: MEDICARE

## 2021-01-26 VITALS
HEART RATE: 97 BPM | DIASTOLIC BLOOD PRESSURE: 84 MMHG | RESPIRATION RATE: 20 BRPM | TEMPERATURE: 97 F | SYSTOLIC BLOOD PRESSURE: 188 MMHG | OXYGEN SATURATION: 100 %

## 2021-01-26 VITALS
DIASTOLIC BLOOD PRESSURE: 75 MMHG | RESPIRATION RATE: 18 BRPM | WEIGHT: 220.02 LBS | HEIGHT: 62 IN | SYSTOLIC BLOOD PRESSURE: 152 MMHG | OXYGEN SATURATION: 96 % | TEMPERATURE: 97 F | HEART RATE: 82 BPM

## 2021-01-26 LAB
ALBUMIN SERPL ELPH-MCNC: 3 G/DL — LOW (ref 3.5–5)
ALP SERPL-CCNC: 89 U/L — SIGNIFICANT CHANGE UP (ref 40–120)
ALT FLD-CCNC: 30 U/L DA — SIGNIFICANT CHANGE UP (ref 10–60)
ANION GAP SERPL CALC-SCNC: 8 MMOL/L — SIGNIFICANT CHANGE UP (ref 5–17)
APPEARANCE UR: CLEAR — SIGNIFICANT CHANGE UP
AST SERPL-CCNC: 25 U/L — SIGNIFICANT CHANGE UP (ref 10–40)
BASOPHILS # BLD AUTO: 0.03 K/UL — SIGNIFICANT CHANGE UP (ref 0–0.2)
BASOPHILS NFR BLD AUTO: 0.2 % — SIGNIFICANT CHANGE UP (ref 0–2)
BILIRUB SERPL-MCNC: 0.6 MG/DL — SIGNIFICANT CHANGE UP (ref 0.2–1.2)
BILIRUB UR-MCNC: NEGATIVE — SIGNIFICANT CHANGE UP
BUN SERPL-MCNC: 15 MG/DL — SIGNIFICANT CHANGE UP (ref 7–18)
CALCIUM SERPL-MCNC: 9.6 MG/DL — SIGNIFICANT CHANGE UP (ref 8.4–10.5)
CHLORIDE SERPL-SCNC: 103 MMOL/L — SIGNIFICANT CHANGE UP (ref 96–108)
CO2 SERPL-SCNC: 29 MMOL/L — SIGNIFICANT CHANGE UP (ref 22–31)
COLOR SPEC: YELLOW — SIGNIFICANT CHANGE UP
CREAT SERPL-MCNC: 0.95 MG/DL — SIGNIFICANT CHANGE UP (ref 0.5–1.3)
DIFF PNL FLD: NEGATIVE — SIGNIFICANT CHANGE UP
EOSINOPHIL # BLD AUTO: 0.02 K/UL — SIGNIFICANT CHANGE UP (ref 0–0.5)
EOSINOPHIL NFR BLD AUTO: 0.2 % — SIGNIFICANT CHANGE UP (ref 0–6)
GLUCOSE SERPL-MCNC: 227 MG/DL — HIGH (ref 70–99)
GLUCOSE UR QL: 50 MG/DL
HCT VFR BLD CALC: 43.9 % — SIGNIFICANT CHANGE UP (ref 34.5–45)
HGB BLD-MCNC: 13.8 G/DL — SIGNIFICANT CHANGE UP (ref 11.5–15.5)
IMM GRANULOCYTES NFR BLD AUTO: 0.3 % — SIGNIFICANT CHANGE UP (ref 0–1.5)
KETONES UR-MCNC: ABNORMAL
LACTATE SERPL-SCNC: 2 MMOL/L — SIGNIFICANT CHANGE UP (ref 0.7–2)
LACTATE SERPL-SCNC: 3.6 MMOL/L — HIGH (ref 0.7–2)
LEUKOCYTE ESTERASE UR-ACNC: ABNORMAL
LYMPHOCYTES # BLD AUTO: 0.6 K/UL — LOW (ref 1–3.3)
LYMPHOCYTES # BLD AUTO: 4.9 % — LOW (ref 13–44)
MCHC RBC-ENTMCNC: 30.3 PG — SIGNIFICANT CHANGE UP (ref 27–34)
MCHC RBC-ENTMCNC: 31.4 GM/DL — LOW (ref 32–36)
MCV RBC AUTO: 96.3 FL — SIGNIFICANT CHANGE UP (ref 80–100)
MONOCYTES # BLD AUTO: 0.47 K/UL — SIGNIFICANT CHANGE UP (ref 0–0.9)
MONOCYTES NFR BLD AUTO: 3.8 % — SIGNIFICANT CHANGE UP (ref 2–14)
NEUTROPHILS # BLD AUTO: 11.13 K/UL — HIGH (ref 1.8–7.4)
NEUTROPHILS NFR BLD AUTO: 90.6 % — HIGH (ref 43–77)
NITRITE UR-MCNC: NEGATIVE — SIGNIFICANT CHANGE UP
NRBC # BLD: 0 /100 WBCS — SIGNIFICANT CHANGE UP (ref 0–0)
PH UR: 5 — SIGNIFICANT CHANGE UP (ref 5–8)
PLATELET # BLD AUTO: 241 K/UL — SIGNIFICANT CHANGE UP (ref 150–400)
POTASSIUM SERPL-MCNC: 4.4 MMOL/L — SIGNIFICANT CHANGE UP (ref 3.5–5.3)
POTASSIUM SERPL-SCNC: 4.4 MMOL/L — SIGNIFICANT CHANGE UP (ref 3.5–5.3)
PROT SERPL-MCNC: 7.6 G/DL — SIGNIFICANT CHANGE UP (ref 6–8.3)
PROT UR-MCNC: 30 MG/DL
RBC # BLD: 4.56 M/UL — SIGNIFICANT CHANGE UP (ref 3.8–5.2)
RBC # FLD: 12.9 % — SIGNIFICANT CHANGE UP (ref 10.3–14.5)
SODIUM SERPL-SCNC: 140 MMOL/L — SIGNIFICANT CHANGE UP (ref 135–145)
SP GR SPEC: 1.02 — SIGNIFICANT CHANGE UP (ref 1.01–1.02)
UROBILINOGEN FLD QL: NEGATIVE — SIGNIFICANT CHANGE UP
WBC # BLD: 12.29 K/UL — HIGH (ref 3.8–10.5)
WBC # FLD AUTO: 12.29 K/UL — HIGH (ref 3.8–10.5)

## 2021-01-26 PROCEDURE — 81001 URINALYSIS AUTO W/SCOPE: CPT

## 2021-01-26 PROCEDURE — 74177 CT ABD & PELVIS W/CONTRAST: CPT

## 2021-01-26 PROCEDURE — 36415 COLL VENOUS BLD VENIPUNCTURE: CPT

## 2021-01-26 PROCEDURE — 87086 URINE CULTURE/COLONY COUNT: CPT

## 2021-01-26 PROCEDURE — 99284 EMERGENCY DEPT VISIT MOD MDM: CPT | Mod: 25

## 2021-01-26 PROCEDURE — 99285 EMERGENCY DEPT VISIT HI MDM: CPT

## 2021-01-26 PROCEDURE — 83605 ASSAY OF LACTIC ACID: CPT

## 2021-01-26 PROCEDURE — 80053 COMPREHEN METABOLIC PANEL: CPT

## 2021-01-26 PROCEDURE — 74177 CT ABD & PELVIS W/CONTRAST: CPT | Mod: 26

## 2021-01-26 PROCEDURE — 85025 COMPLETE CBC W/AUTO DIFF WBC: CPT

## 2021-01-26 RX ORDER — SODIUM CHLORIDE 9 MG/ML
500 INJECTION INTRAMUSCULAR; INTRAVENOUS; SUBCUTANEOUS ONCE
Refills: 0 | Status: COMPLETED | OUTPATIENT
Start: 2021-01-26 | End: 2021-01-26

## 2021-01-26 RX ORDER — IOHEXOL 300 MG/ML
30 INJECTION, SOLUTION INTRAVENOUS ONCE
Refills: 0 | Status: COMPLETED | OUTPATIENT
Start: 2021-01-26 | End: 2021-01-26

## 2021-01-26 RX ADMIN — SODIUM CHLORIDE 500 MILLILITER(S): 9 INJECTION INTRAMUSCULAR; INTRAVENOUS; SUBCUTANEOUS at 17:15

## 2021-01-26 RX ADMIN — IOHEXOL 30 MILLILITER(S): 300 INJECTION, SOLUTION INTRAVENOUS at 13:03

## 2021-01-26 NOTE — ED PROVIDER NOTE - PROGRESS NOTE DETAILS
Sauceda:  Pt received in signout pending CT A/P--CT showed abdominal hernia with nonobstructed small bowel and fat.  No sign of obstruction/strangulation/ischemia on CT.  Pt comfortable without pain or vomiting now.  Lactate was elevated.  Pt hydrated and repeat lactate is now 2.0.  Urine suspected to be skin yael contamination.  Advised strict return precautions and f/u with Pt's doctor, primary/oncology.  She requests info for neuro f/u for neuropathy treatment as well, so it was provided.

## 2021-01-26 NOTE — ED PROVIDER NOTE - CARE PROVIDER_API CALL
Charlie Zayas)  Clinical Neurophysiology; Neurology  [address], [address] [place] [address]  Phone: [phone]  Fax: [phone]  Follow Up Time: 7-10 Days

## 2021-01-26 NOTE — ED PROVIDER NOTE - OBJECTIVE STATEMENT
pt with abd pain hx of endometrial cancer with hysterectomy and s/p chemo/radiation  also known incisional hernia with pain in that area for two days and vomiting once today at home NBNB

## 2021-01-26 NOTE — ED ADULT NURSE NOTE - OBJECTIVE STATEMENT
Pt c/o of severe abdominal pain with nausea and vomiting x 1 this morning which now resolved. Reported feeling feverish. Denies SOB and diarrhea

## 2021-01-26 NOTE — ED PROVIDER NOTE - PSH
C Section 1965    cyst removed from the neck 1966    History of Dilatation and Curettage (ICD9 V45.89)

## 2021-01-26 NOTE — ED PROVIDER NOTE - PATIENT PORTAL LINK FT
You can access the FollowMyHealth Patient Portal offered by [facility] by registering at the following website: [email or web address]. By joining [employer]’s FollowMyHealth portal, you will also be able to view your health information using other applications (apps) compatible with our system.

## 2021-01-26 NOTE — ED ADULT NURSE NOTE - NSIMPLEMENTINTERV_GEN_ALL_ED
Implemented All Fall Risk Interventions:  [place] to call system. Call bell, personal items and telephone within reach. Instruct patient to call for assistance. Room bathroom lighting operational. Non-slip footwear when patient is off stretcher. Physically safe environment: no spills, clutter or unnecessary equipment. Stretcher in lowest position, wheels locked, appropriate side rails in place. Provide visual cue, wrist band, yellow gown, etc. Monitor gait and stability. Monitor for mental status changes and reorient to person, place, and time. Review medications for side effects contributing to fall risk. Reinforce activity limits and safety measures with patient and family.

## 2021-01-26 NOTE — ED PROVIDER NOTE - PMH
Acne Cystica (ICD9 706.1)    CHF (Congestive Heart Failure) (ICD9 428.0)    Obesity (ICD9 278.00)    DENIS (Obstructive Sleep Apnea) (ICD9 327.23)    systemic edema

## 2021-01-27 LAB
CULTURE RESULTS: SIGNIFICANT CHANGE UP
SPECIMEN SOURCE: SIGNIFICANT CHANGE UP

## 2021-07-06 ENCOUNTER — OUTPATIENT (OUTPATIENT)
Dept: OUTPATIENT SERVICES | Facility: HOSPITAL | Age: 86
LOS: 1 days | Discharge: HOME | End: 2021-07-06

## 2021-07-06 DIAGNOSIS — C54.1 MALIGNANT NEOPLASM OF ENDOMETRIUM: ICD-10-CM

## 2021-07-07 ENCOUNTER — APPOINTMENT (OUTPATIENT)
Dept: HEMATOLOGY ONCOLOGY | Facility: CLINIC | Age: 86
End: 2021-07-07
Payer: MEDICARE

## 2021-07-07 PROCEDURE — 99442: CPT

## 2021-07-08 NOTE — HISTORY OF PRESENT ILLNESS
[Disease: _____________________] : Disease: [unfilled] [AJCC Stage: ____] : AJCC Stage: [unfilled] [de-identified] : 80y.o female with h/o EMCA diagnosed in 2009, s/p surgical staging followed by Carboplatin and Taxol and vaginal brachytherapy on .\par Patient was going for q6 month surveillance. In March,2016 patient developed cough, w/u showed single JENNIFER 4 cm mass. 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 C/o fatigue, denies any weight loss, bleed or loss of appetite. [de-identified] : Patient is going for cataract surgery.\par She also c.o some black floaters in front of her eyes, going on for few yrs.

## 2021-07-08 NOTE — REASON FOR VISIT
[Home] : at home, [unfilled] , at the time of the visit. [Medical Office: ([facility])___] : at the medical office located in  [Verbal consent obtained from patient] : the patient, [unfilled] [Follow-Up Visit] : a follow-up [FreeTextEntry2] : relapsed EMCA

## 2021-08-08 ENCOUNTER — OUTPATIENT (OUTPATIENT)
Dept: OUTPATIENT SERVICES | Facility: HOSPITAL | Age: 86
LOS: 1 days | Discharge: HOME | End: 2021-08-08

## 2021-08-08 DIAGNOSIS — C54.1 MALIGNANT NEOPLASM OF ENDOMETRIUM: ICD-10-CM

## 2021-08-09 ENCOUNTER — RESULT REVIEW (OUTPATIENT)
Age: 86
End: 2021-08-09

## 2021-08-09 ENCOUNTER — OUTPATIENT (OUTPATIENT)
Dept: OUTPATIENT SERVICES | Facility: HOSPITAL | Age: 86
LOS: 1 days | End: 2021-08-09
Payer: MEDICARE

## 2021-08-09 ENCOUNTER — APPOINTMENT (OUTPATIENT)
Dept: RADIOLOGY | Facility: IMAGING CENTER | Age: 86
End: 2021-08-09
Payer: MEDICARE

## 2021-08-09 ENCOUNTER — APPOINTMENT (OUTPATIENT)
Dept: HEMATOLOGY ONCOLOGY | Facility: CLINIC | Age: 86
End: 2021-08-09

## 2021-08-09 ENCOUNTER — APPOINTMENT (OUTPATIENT)
Dept: CT IMAGING | Facility: IMAGING CENTER | Age: 86
End: 2021-08-09
Payer: MEDICARE

## 2021-08-09 DIAGNOSIS — C54.1 MALIGNANT NEOPLASM OF ENDOMETRIUM: ICD-10-CM

## 2021-08-09 LAB
BASOPHILS # BLD AUTO: 0.04 K/UL — SIGNIFICANT CHANGE UP (ref 0–0.2)
BASOPHILS NFR BLD AUTO: 0.5 % — SIGNIFICANT CHANGE UP (ref 0–2)
EOSINOPHIL # BLD AUTO: 0.19 K/UL — SIGNIFICANT CHANGE UP (ref 0–0.5)
EOSINOPHIL NFR BLD AUTO: 2.3 % — SIGNIFICANT CHANGE UP (ref 0–6)
HCT VFR BLD CALC: 41.7 % — SIGNIFICANT CHANGE UP (ref 34.5–45)
HGB BLD-MCNC: 13.3 G/DL — SIGNIFICANT CHANGE UP (ref 11.5–15.5)
IMM GRANULOCYTES NFR BLD AUTO: 0.5 % — SIGNIFICANT CHANGE UP (ref 0–1.5)
LYMPHOCYTES # BLD AUTO: 1.09 K/UL — SIGNIFICANT CHANGE UP (ref 1–3.3)
LYMPHOCYTES # BLD AUTO: 13.2 % — SIGNIFICANT CHANGE UP (ref 13–44)
MCHC RBC-ENTMCNC: 30.4 PG — SIGNIFICANT CHANGE UP (ref 27–34)
MCHC RBC-ENTMCNC: 31.9 G/DL — LOW (ref 32–36)
MCV RBC AUTO: 95.2 FL — SIGNIFICANT CHANGE UP (ref 80–100)
MONOCYTES # BLD AUTO: 0.88 K/UL — SIGNIFICANT CHANGE UP (ref 0–0.9)
MONOCYTES NFR BLD AUTO: 10.7 % — SIGNIFICANT CHANGE UP (ref 2–14)
NEUTROPHILS # BLD AUTO: 6.01 K/UL — SIGNIFICANT CHANGE UP (ref 1.8–7.4)
NEUTROPHILS NFR BLD AUTO: 72.8 % — SIGNIFICANT CHANGE UP (ref 43–77)
NRBC # BLD: 0 /100 WBCS — SIGNIFICANT CHANGE UP (ref 0–0)
PLATELET # BLD AUTO: 240 K/UL — SIGNIFICANT CHANGE UP (ref 150–400)
RBC # BLD: 4.38 M/UL — SIGNIFICANT CHANGE UP (ref 3.8–5.2)
RBC # FLD: 13 % — SIGNIFICANT CHANGE UP (ref 10.3–14.5)
WBC # BLD: 8.25 K/UL — SIGNIFICANT CHANGE UP (ref 3.8–10.5)
WBC # FLD AUTO: 8.25 K/UL — SIGNIFICANT CHANGE UP (ref 3.8–10.5)

## 2021-08-09 PROCEDURE — 71250 CT THORAX DX C-: CPT

## 2021-08-09 PROCEDURE — 71250 CT THORAX DX C-: CPT | Mod: 26

## 2021-08-10 LAB
ALBUMIN SERPL ELPH-MCNC: 4.4 G/DL
ALP BLD-CCNC: 87 U/L
ALT SERPL-CCNC: 16 U/L
ANION GAP SERPL CALC-SCNC: 13 MMOL/L
AST SERPL-CCNC: 23 U/L
BILIRUB SERPL-MCNC: 0.4 MG/DL
BUN SERPL-MCNC: 17 MG/DL
CALCIUM SERPL-MCNC: 10.1 MG/DL
CANCER AG125 SERPL-ACNC: 8 U/ML
CEA SERPL-MCNC: 1.6 NG/ML
CHLORIDE SERPL-SCNC: 97 MMOL/L
CO2 SERPL-SCNC: 30 MMOL/L
CREAT SERPL-MCNC: 0.74 MG/DL
GLUCOSE SERPL-MCNC: 91 MG/DL
POTASSIUM SERPL-SCNC: 3.9 MMOL/L
PROT SERPL-MCNC: 7.3 G/DL
SODIUM SERPL-SCNC: 140 MMOL/L

## 2021-09-11 ENCOUNTER — OUTPATIENT (OUTPATIENT)
Dept: OUTPATIENT SERVICES | Facility: HOSPITAL | Age: 86
LOS: 1 days | Discharge: HOME | End: 2021-09-11

## 2021-09-11 DIAGNOSIS — C54.1 MALIGNANT NEOPLASM OF ENDOMETRIUM: ICD-10-CM

## 2021-10-13 ENCOUNTER — APPOINTMENT (OUTPATIENT)
Dept: HEMATOLOGY ONCOLOGY | Facility: CLINIC | Age: 86
End: 2021-10-13

## 2021-12-05 ENCOUNTER — OUTPATIENT (OUTPATIENT)
Dept: OUTPATIENT SERVICES | Facility: HOSPITAL | Age: 86
LOS: 1 days | Discharge: HOME | End: 2021-12-05

## 2021-12-05 DIAGNOSIS — C54.1 MALIGNANT NEOPLASM OF ENDOMETRIUM: ICD-10-CM

## 2021-12-15 ENCOUNTER — APPOINTMENT (OUTPATIENT)
Dept: HEMATOLOGY ONCOLOGY | Facility: CLINIC | Age: 86
End: 2021-12-15

## 2022-02-08 ENCOUNTER — APPOINTMENT (OUTPATIENT)
Dept: HEMATOLOGY ONCOLOGY | Facility: CLINIC | Age: 87
End: 2022-02-08

## 2022-04-13 ENCOUNTER — OUTPATIENT (OUTPATIENT)
Dept: OUTPATIENT SERVICES | Facility: HOSPITAL | Age: 87
LOS: 1 days | Discharge: HOME | End: 2022-04-13

## 2022-04-13 DIAGNOSIS — C54.1 MALIGNANT NEOPLASM OF ENDOMETRIUM: ICD-10-CM

## 2022-05-18 ENCOUNTER — OUTPATIENT (OUTPATIENT)
Dept: OUTPATIENT SERVICES | Facility: HOSPITAL | Age: 87
LOS: 1 days | Discharge: HOME | End: 2022-05-18

## 2022-05-18 DIAGNOSIS — C54.1 MALIGNANT NEOPLASM OF ENDOMETRIUM: ICD-10-CM

## 2022-05-20 ENCOUNTER — RESULT REVIEW (OUTPATIENT)
Age: 87
End: 2022-05-20

## 2022-05-20 ENCOUNTER — APPOINTMENT (OUTPATIENT)
Dept: HEMATOLOGY ONCOLOGY | Facility: CLINIC | Age: 87
End: 2022-05-20
Payer: MEDICARE

## 2022-05-20 VITALS
TEMPERATURE: 97.7 F | DIASTOLIC BLOOD PRESSURE: 71 MMHG | SYSTOLIC BLOOD PRESSURE: 118 MMHG | WEIGHT: 220 LBS | HEART RATE: 75 BPM | BODY MASS INDEX: 40.48 KG/M2 | HEIGHT: 61.97 IN | OXYGEN SATURATION: 98 % | RESPIRATION RATE: 16 BRPM

## 2022-05-20 LAB
BASOPHILS # BLD AUTO: 0.05 K/UL — SIGNIFICANT CHANGE UP (ref 0–0.2)
BASOPHILS NFR BLD AUTO: 0.7 % — SIGNIFICANT CHANGE UP (ref 0–2)
EOSINOPHIL # BLD AUTO: 0.17 K/UL — SIGNIFICANT CHANGE UP (ref 0–0.5)
EOSINOPHIL NFR BLD AUTO: 2.2 % — SIGNIFICANT CHANGE UP (ref 0–6)
HCT VFR BLD CALC: 41.7 % — SIGNIFICANT CHANGE UP (ref 34.5–45)
HGB BLD-MCNC: 13.2 G/DL — SIGNIFICANT CHANGE UP (ref 11.5–15.5)
IMM GRANULOCYTES NFR BLD AUTO: 0.4 % — SIGNIFICANT CHANGE UP (ref 0–1.5)
LYMPHOCYTES # BLD AUTO: 1.01 K/UL — SIGNIFICANT CHANGE UP (ref 1–3.3)
LYMPHOCYTES # BLD AUTO: 13.1 % — SIGNIFICANT CHANGE UP (ref 13–44)
MCHC RBC-ENTMCNC: 30.7 PG — SIGNIFICANT CHANGE UP (ref 27–34)
MCHC RBC-ENTMCNC: 31.7 G/DL — LOW (ref 32–36)
MCV RBC AUTO: 97 FL — SIGNIFICANT CHANGE UP (ref 80–100)
MONOCYTES # BLD AUTO: 0.72 K/UL — SIGNIFICANT CHANGE UP (ref 0–0.9)
MONOCYTES NFR BLD AUTO: 9.4 % — SIGNIFICANT CHANGE UP (ref 2–14)
NEUTROPHILS # BLD AUTO: 5.71 K/UL — SIGNIFICANT CHANGE UP (ref 1.8–7.4)
NEUTROPHILS NFR BLD AUTO: 74.2 % — SIGNIFICANT CHANGE UP (ref 43–77)
NRBC # BLD: 0 /100 WBCS — SIGNIFICANT CHANGE UP (ref 0–0)
PLATELET # BLD AUTO: 211 K/UL — SIGNIFICANT CHANGE UP (ref 150–400)
RBC # BLD: 4.3 M/UL — SIGNIFICANT CHANGE UP (ref 3.8–5.2)
RBC # FLD: 13.2 % — SIGNIFICANT CHANGE UP (ref 10.3–14.5)
WBC # BLD: 7.69 K/UL — SIGNIFICANT CHANGE UP (ref 3.8–10.5)
WBC # FLD AUTO: 7.69 K/UL — SIGNIFICANT CHANGE UP (ref 3.8–10.5)

## 2022-05-20 PROCEDURE — 99213 OFFICE O/P EST LOW 20 MIN: CPT

## 2022-05-20 NOTE — HISTORY OF PRESENT ILLNESS
[Disease: _____________________] : Disease: [unfilled] [AJCC Stage: ____] : AJCC Stage: [unfilled] [de-identified] : 80y.o female with h/o EMCA diagnosed in 2009, s/p surgical staging followed by Carboplatin and Taxol and vaginal brachytherapy on .\par Patient was going for q6 month surveillance. In March,2016 patient developed cough, w/u showed single JENNIFER 4 cm mass. 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 C/o fatigue, denies any weight loss, bleed or loss of appetite. [de-identified] : Patient here today for follow up, complains of chronic fatigue which has worsened.  Has increased weakness with walking.\par She has decreased appetite, has lost some weight.\par She has recently had a new rash , pending evaluation by derm at [facility].\par She denies abdominal pain, bloating, nausea, vomiting.

## 2022-05-23 LAB
ALBUMIN SERPL ELPH-MCNC: 4.2 G/DL
ALP BLD-CCNC: 89 U/L
ALT SERPL-CCNC: 13 U/L
ANION GAP SERPL CALC-SCNC: 13 MMOL/L
AST SERPL-CCNC: 14 U/L
BILIRUB SERPL-MCNC: 0.2 MG/DL
BUN SERPL-MCNC: 14 MG/DL
CALCIUM SERPL-MCNC: 10.1 MG/DL
CANCER AG125 SERPL-ACNC: 9 U/ML
CEA SERPL-MCNC: 1.6 NG/ML
CHLORIDE SERPL-SCNC: 103 MMOL/L
CO2 SERPL-SCNC: 26 MMOL/L
CREAT SERPL-MCNC: 0.62 MG/DL
EGFR: 88 ML/MIN/1.73M2
GLUCOSE SERPL-MCNC: 102 MG/DL
POTASSIUM SERPL-SCNC: 4.8 MMOL/L
PROT SERPL-MCNC: 7 G/DL
SODIUM SERPL-SCNC: 141 MMOL/L

## 2022-07-09 ENCOUNTER — APPOINTMENT (OUTPATIENT)
Dept: CT IMAGING | Facility: IMAGING CENTER | Age: 87
End: 2022-07-09

## 2022-08-07 ENCOUNTER — INPATIENT (INPATIENT)
Facility: HOSPITAL | Age: 87
LOS: 4 days | Discharge: NURSING, REHAB OR LONG-TERM CARE | DRG: 565 | End: 2022-08-12
Attending: INTERNAL MEDICINE | Admitting: INTERNAL MEDICINE
Payer: MEDICARE

## 2022-08-07 VITALS
DIASTOLIC BLOOD PRESSURE: 75 MMHG | HEIGHT: 62 IN | HEART RATE: 110 BPM | WEIGHT: 214.95 LBS | TEMPERATURE: 98 F | SYSTOLIC BLOOD PRESSURE: 156 MMHG | OXYGEN SATURATION: 96 % | RESPIRATION RATE: 18 BRPM

## 2022-08-07 PROCEDURE — 99285 EMERGENCY DEPT VISIT HI MDM: CPT

## 2022-08-07 RX ORDER — DIAZEPAM 5 MG
5 TABLET ORAL ONCE
Refills: 0 | Status: DISCONTINUED | OUTPATIENT
Start: 2022-08-07 | End: 2022-08-07

## 2022-08-07 RX ORDER — ACETAMINOPHEN 500 MG
975 TABLET ORAL ONCE
Refills: 0 | Status: COMPLETED | OUTPATIENT
Start: 2022-08-07 | End: 2022-08-07

## 2022-08-07 RX ADMIN — Medication 975 MILLIGRAM(S): at 23:38

## 2022-08-07 RX ADMIN — Medication 5 MILLIGRAM(S): at 23:07

## 2022-08-07 RX ADMIN — Medication 975 MILLIGRAM(S): at 23:08

## 2022-08-07 NOTE — ED ADULT TRIAGE NOTE - CHIEF COMPLAINT QUOTE
BIBA from home for mechanical slip and fall in bathroom. Denies LOC. States she hit back of head in fall. C/O aniyah. lower leg pain. As per EMS, patient had fallen and been on floor for 24 hours. Denies blood thinners.

## 2022-08-07 NOTE — ED PROVIDER NOTE - PHYSICAL EXAMINATION
General: pt lying in stretcher, appears stated age and is not in distress  HEENT: AT/NC, pink conjunctiva, anicteric sclerae, EOMI, PERRLA, TMs smooth, grey, intact, with normal landmarks, nasal mucosa pink, no discharge, turbinates not enlarged; moist mucus membranes, tongue well-papillated, good dentition; posterior pharynx shows no erythema or exudates;   Neck: supple, full ROM, trachea midline, no JVD, no cervical LAD, no midline ttp or stepoffs  Lungs: symmetric excursion, b/l clear vesicular breath sounds with no wheezes, crackles, or rhonchi  Heart: rrr, S1, S2 normal; no S3 or S4; no murmurs or rubs  Abd: normal bowel sounds; soft, nontender; negative McBurney's point tenderness, negative Johnson's sign, no rebound, no guarding, spleen non-palpable; no hepatomegaly, no masses  Back: no midline spinal tenderness or stepoffs, no costovertebral angle tenderness  Extremities: no clubbing, cyanosis, or edema; no palpable deformities or fractures  Skin: good turgor; no rashes, petechiae, ecchymoses, or jaundice  Pulses: radial, posterior tibialis (PT), dorsalis pedis (DP) all 2+ & symmetric  Neuro: awake, alert, responsive; oriented to person, place and time; cranial nerves intact, EOMI, intact jaw movement, intact facial sensation, no facial asymmetry, hearing intact; no nystagmus, tongue midline; Motor: Normal tone in upper and lower extremities bilaterally strength 5/5; Sensory: intact to pinprick and light touch; Cerebellar: finger-to-nose intact; normal steady gait; negative Romberg’s sign; DTR: biceps, triceps, patellar, 2+, no pronator drift

## 2022-08-07 NOTE — ED PROVIDER NOTE - PROGRESS NOTE DETAILS
patient signedout to me by Dr. Butler. Awaiting CThead/cspine.  CTs shows no traumatic injury, on reeval patient continues to complain of left hip pain, pelvis XR shows no obvious fracture, will obtain CT hip.  patient stable for admission for pain management and PT/OT evaluation.  Discussed above with Dr. Smith.  ~Angelica CORREA

## 2022-08-07 NOTE — ED PROVIDER NOTE - CLINICAL SUMMARY MEDICAL DECISION MAKING FREE TEXT BOX
Pt w/ aforementioned presentation concerning for but not limited to __   Will get labs, imaging, treat symptoms, monitor and reassess.

## 2022-08-07 NOTE — ED PROVIDER NOTE - OBJECTIVE STATEMENT
87 yo f w/ PMH of sciatica, endometrial cancer with hysterectomy and s/p chemo/radiation and ventral hernia now c/o pain s/p fall. pt reports the bathroom floor was wet and she slipped and fell back hitting her head on the ground. She denies any LOC but reports pain to her head and worsening of her LE sciatica b/l. Denies any focal weakness or numbness, prodromal symptoms or any other complaints.

## 2022-08-08 DIAGNOSIS — R26.2 DIFFICULTY IN WALKING, NOT ELSEWHERE CLASSIFIED: ICD-10-CM

## 2022-08-08 DIAGNOSIS — Z29.9 ENCOUNTER FOR PROPHYLACTIC MEASURES, UNSPECIFIED: ICD-10-CM

## 2022-08-08 DIAGNOSIS — E66.9 OBESITY, UNSPECIFIED: ICD-10-CM

## 2022-08-08 DIAGNOSIS — C54.1 MALIGNANT NEOPLASM OF ENDOMETRIUM: ICD-10-CM

## 2022-08-08 DIAGNOSIS — M25.552 PAIN IN LEFT HIP: ICD-10-CM
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
## 2022-08-08 DIAGNOSIS — Z90.710 ACQUIRED ABSENCE OF BOTH CERVIX AND UTERUS: Chronic | ICD-10-CM

## 2022-08-08 DIAGNOSIS — M62.82 RHABDOMYOLYSIS: ICD-10-CM

## 2022-08-08 DIAGNOSIS — Z90.2 ACQUIRED ABSENCE OF LUNG [PART OF]: Chronic | ICD-10-CM

## 2022-08-08 DIAGNOSIS — I50.9 HEART FAILURE, UNSPECIFIED: ICD-10-CM

## 2022-08-08 DIAGNOSIS — G62.9 POLYNEUROPATHY, UNSPECIFIED: ICD-10-CM

## 2022-08-08 DIAGNOSIS — M54.9 DORSALGIA, UNSPECIFIED: ICD-10-CM

## 2022-08-08 LAB
ALBUMIN SERPL ELPH-MCNC: 2.7 G/DL — LOW (ref 3.5–5)
ALP SERPL-CCNC: 54 U/L — SIGNIFICANT CHANGE UP (ref 40–120)
ALT FLD-CCNC: 39 U/L DA — SIGNIFICANT CHANGE UP (ref 10–60)
ANION GAP SERPL CALC-SCNC: 4 MMOL/L — LOW (ref 5–17)
APPEARANCE UR: CLEAR — SIGNIFICANT CHANGE UP
AST SERPL-CCNC: 73 U/L — HIGH (ref 10–40)
BASOPHILS # BLD AUTO: 0.04 K/UL — SIGNIFICANT CHANGE UP (ref 0–0.2)
BASOPHILS NFR BLD AUTO: 0.3 % — SIGNIFICANT CHANGE UP (ref 0–2)
BILIRUB SERPL-MCNC: 0.8 MG/DL — SIGNIFICANT CHANGE UP (ref 0.2–1.2)
BILIRUB UR-MCNC: NEGATIVE — SIGNIFICANT CHANGE UP
BUN SERPL-MCNC: 22 MG/DL — HIGH (ref 7–18)
CALCIUM SERPL-MCNC: 8.8 MG/DL — SIGNIFICANT CHANGE UP (ref 8.4–10.5)
CHLORIDE SERPL-SCNC: 110 MMOL/L — HIGH (ref 96–108)
CK SERPL-CCNC: 1882 U/L — HIGH (ref 21–215)
CO2 SERPL-SCNC: 30 MMOL/L — SIGNIFICANT CHANGE UP (ref 22–31)
COLOR SPEC: YELLOW — SIGNIFICANT CHANGE UP
CREAT SERPL-MCNC: 0.83 MG/DL — SIGNIFICANT CHANGE UP (ref 0.5–1.3)
DIFF PNL FLD: ABNORMAL
EGFR: 69 ML/MIN/1.73M2 — SIGNIFICANT CHANGE UP
EOSINOPHIL # BLD AUTO: 0.07 K/UL — SIGNIFICANT CHANGE UP (ref 0–0.5)
EOSINOPHIL NFR BLD AUTO: 0.5 % — SIGNIFICANT CHANGE UP (ref 0–6)
GLUCOSE SERPL-MCNC: 138 MG/DL — HIGH (ref 70–99)
GLUCOSE UR QL: NEGATIVE — SIGNIFICANT CHANGE UP
HCT VFR BLD CALC: 37.4 % — SIGNIFICANT CHANGE UP (ref 34.5–45)
HGB BLD-MCNC: 12.1 G/DL — SIGNIFICANT CHANGE UP (ref 11.5–15.5)
IMM GRANULOCYTES NFR BLD AUTO: 0.6 % — SIGNIFICANT CHANGE UP (ref 0–1.5)
KETONES UR-MCNC: NEGATIVE — SIGNIFICANT CHANGE UP
LEUKOCYTE ESTERASE UR-ACNC: ABNORMAL
LYMPHOCYTES # BLD AUTO: 1.54 K/UL — SIGNIFICANT CHANGE UP (ref 1–3.3)
LYMPHOCYTES # BLD AUTO: 10.4 % — LOW (ref 13–44)
MCHC RBC-ENTMCNC: 31.7 PG — SIGNIFICANT CHANGE UP (ref 27–34)
MCHC RBC-ENTMCNC: 32.4 GM/DL — SIGNIFICANT CHANGE UP (ref 32–36)
MCV RBC AUTO: 97.9 FL — SIGNIFICANT CHANGE UP (ref 80–100)
MONOCYTES # BLD AUTO: 1.12 K/UL — HIGH (ref 0–0.9)
MONOCYTES NFR BLD AUTO: 7.6 % — SIGNIFICANT CHANGE UP (ref 2–14)
NEUTROPHILS # BLD AUTO: 11.88 K/UL — HIGH (ref 1.8–7.4)
NEUTROPHILS NFR BLD AUTO: 80.6 % — HIGH (ref 43–77)
NITRITE UR-MCNC: NEGATIVE — SIGNIFICANT CHANGE UP
NRBC # BLD: 0 /100 WBCS — SIGNIFICANT CHANGE UP (ref 0–0)
PH UR: 5 — SIGNIFICANT CHANGE UP (ref 5–8)
PLATELET # BLD AUTO: 241 K/UL — SIGNIFICANT CHANGE UP (ref 150–400)
POTASSIUM SERPL-MCNC: 3.7 MMOL/L — SIGNIFICANT CHANGE UP (ref 3.5–5.3)
POTASSIUM SERPL-SCNC: 3.7 MMOL/L — SIGNIFICANT CHANGE UP (ref 3.5–5.3)
PROT SERPL-MCNC: 6.7 G/DL — SIGNIFICANT CHANGE UP (ref 6–8.3)
PROT UR-MCNC: 100
RBC # BLD: 3.82 M/UL — SIGNIFICANT CHANGE UP (ref 3.8–5.2)
RBC # FLD: 13.9 % — SIGNIFICANT CHANGE UP (ref 10.3–14.5)
SARS-COV-2 RNA SPEC QL NAA+PROBE: SIGNIFICANT CHANGE UP
SODIUM SERPL-SCNC: 144 MMOL/L — SIGNIFICANT CHANGE UP (ref 135–145)
SP GR SPEC: 1.02 — SIGNIFICANT CHANGE UP (ref 1.01–1.02)
UROBILINOGEN FLD QL: NEGATIVE — SIGNIFICANT CHANGE UP
WBC # BLD: 14.74 K/UL — HIGH (ref 3.8–10.5)
WBC # FLD AUTO: 14.74 K/UL — HIGH (ref 3.8–10.5)

## 2022-08-08 PROCEDURE — 70450 CT HEAD/BRAIN W/O DYE: CPT | Mod: 26,MA

## 2022-08-08 PROCEDURE — 99222 1ST HOSP IP/OBS MODERATE 55: CPT

## 2022-08-08 PROCEDURE — 72170 X-RAY EXAM OF PELVIS: CPT | Mod: 26

## 2022-08-08 PROCEDURE — 72125 CT NECK SPINE W/O DYE: CPT | Mod: 26,MA

## 2022-08-08 PROCEDURE — 73700 CT LOWER EXTREMITY W/O DYE: CPT | Mod: 26,LT,MA

## 2022-08-08 RX ORDER — ONDANSETRON 8 MG/1
4 TABLET, FILM COATED ORAL EVERY 8 HOURS
Refills: 0 | Status: DISCONTINUED | OUTPATIENT
Start: 2022-08-08 | End: 2022-08-12

## 2022-08-08 RX ORDER — LANOLIN ALCOHOL/MO/W.PET/CERES
3 CREAM (GRAM) TOPICAL AT BEDTIME
Refills: 0 | Status: DISCONTINUED | OUTPATIENT
Start: 2022-08-08 | End: 2022-08-12

## 2022-08-08 RX ORDER — ESCITALOPRAM OXALATE 10 MG/1
0 TABLET, FILM COATED ORAL
Qty: 0 | Refills: 0 | DISCHARGE

## 2022-08-08 RX ORDER — LIDOCAINE 4 G/100G
1 CREAM TOPICAL DAILY
Refills: 0 | Status: DISCONTINUED | OUTPATIENT
Start: 2022-08-08 | End: 2022-08-12

## 2022-08-08 RX ORDER — ACETAMINOPHEN 500 MG
650 TABLET ORAL ONCE
Refills: 0 | Status: COMPLETED | OUTPATIENT
Start: 2022-08-08 | End: 2022-08-08

## 2022-08-08 RX ORDER — ACETAMINOPHEN 500 MG
1000 TABLET ORAL EVERY 8 HOURS
Refills: 0 | Status: COMPLETED | OUTPATIENT
Start: 2022-08-08 | End: 2022-08-11

## 2022-08-08 RX ORDER — SODIUM CHLORIDE 9 MG/ML
1000 INJECTION INTRAMUSCULAR; INTRAVENOUS; SUBCUTANEOUS
Refills: 0 | Status: COMPLETED | OUTPATIENT
Start: 2022-08-08 | End: 2022-08-09

## 2022-08-08 RX ORDER — ENOXAPARIN SODIUM 100 MG/ML
40 INJECTION SUBCUTANEOUS EVERY 24 HOURS
Refills: 0 | Status: DISCONTINUED | OUTPATIENT
Start: 2022-08-08 | End: 2022-08-12

## 2022-08-08 RX ORDER — PANTOPRAZOLE SODIUM 20 MG/1
40 TABLET, DELAYED RELEASE ORAL
Refills: 0 | Status: COMPLETED | OUTPATIENT
Start: 2022-08-08 | End: 2022-08-11

## 2022-08-08 RX ADMIN — Medication 1000 MILLIGRAM(S): at 13:51

## 2022-08-08 RX ADMIN — SODIUM CHLORIDE 85 MILLILITER(S): 9 INJECTION INTRAMUSCULAR; INTRAVENOUS; SUBCUTANEOUS at 18:57

## 2022-08-08 RX ADMIN — Medication 1000 MILLIGRAM(S): at 13:21

## 2022-08-08 RX ADMIN — Medication 650 MILLIGRAM(S): at 06:47

## 2022-08-08 RX ADMIN — Medication 1000 MILLIGRAM(S): at 23:30

## 2022-08-08 RX ADMIN — LIDOCAINE 1 PATCH: 4 CREAM TOPICAL at 13:19

## 2022-08-08 RX ADMIN — Medication 1000 MILLIGRAM(S): at 22:30

## 2022-08-08 RX ADMIN — Medication 650 MILLIGRAM(S): at 05:47

## 2022-08-08 RX ADMIN — LIDOCAINE 1 PATCH: 4 CREAM TOPICAL at 23:49

## 2022-08-08 NOTE — CONSULT NOTE ADULT - ASSESSMENT
Confidential Drug Utilization Report  Search Terms: Grace Adam, 1935Search Date: 08/08/2022 12:00:37 PM  The Drug Utilization Report below displays all of the controlled substance prescriptions, if any, that your patient has filled in the last twelve months. The information displayed on this report is compiled from pharmacy submissions to the Department, and accurately reflects the information as submitted by the pharmacies.    This report was requested by: Fransisca Lynn | Reference #: [id number]    There are no results for the search terms that you entered.

## 2022-08-08 NOTE — H&P ADULT - NSHPPHYSICALEXAM_GEN_ALL_CORE
Vital Signs Last 24 Hrs  T(C): 37 (08 Aug 2022 12:03), Max: 37 (08 Aug 2022 12:03)  T(F): 98.6 (08 Aug 2022 12:03), Max: 98.6 (08 Aug 2022 12:03)  HR: 77 (08 Aug 2022 12:03) (74 - 110)  BP: 146/54 (08 Aug 2022 12:03) (135/70 - 156/75)  BP(mean): --  RR: 19 (08 Aug 2022 12:03) (17 - 19)  SpO2: 96% (08 Aug 2022 12:03) (96% - 96%)    Parameters below as of 08 Aug 2022 12:03  Patient On (Oxygen Delivery Method): room air      GENERAL: NAD, lying in bed comfortably (+) lateral Recumbant, unable to lie supine (+) obese  HEAD:  Atraumatic, Normocephalic  EYES: EOMI, PERRLA, conjunctiva and sclera clear  ENT: Moist mucous membranes  NECK: Supple, No JVD  CHEST/LUNG: Clear to auscultation bilaterally; No rales, rhonchi, wheezing, or rubs. Unlabored respirations  HEART: Regular rate and rhythm; No murmurs, rubs, or gallops  ABDOMEN: Bowel sounds present; Soft, Nontender, Nondistended. No hepatomegally  EXTREMITIES:  2+ Peripheral Pulses, brisk capillary refill. No clubbing, cyanosis, or edema  NERVOUS SYSTEM:  Alert & Oriented X2-3, speech clear. No deficits (+) diminished sensation on the LLE > RLE  MSK: Limited all 4 extremities, full and equal strength d/t pain   SKIN: No rashes or lesions

## 2022-08-08 NOTE — H&P ADULT - PROBLEM SELECTOR PLAN 2
- Patient presents s/p fall associated with SOB, but no LOC, headache, dizziness, chest pain  - likely mechanical, could be due to ambulatory dysfunction, sciatica, knee weakness and pain   - CT head Non-contrast :  no acute hge or stroke   - Fall Precaution   - f/u PT consult  - f/u Nutrition consult   - f/u CM  - F/u xray pelvis, knee s/p fall associated with SOB, but no LOC, headache, dizziness, chest pain  likely mechanical, could be due to ambulatory dysfunction, sciatica, knee weakness and pain   CT head Non-contrast :  no acute hge or stroke   CT hip shows no fractures   Fall Precaution   PT consulted   CM and SW consulted

## 2022-08-08 NOTE — H&P ADULT - HISTORY OF PRESENT ILLNESS
An 87 y/o female presents to the ED after she fell coming out of the bathroom and into her bedroom on Saturday (8/6) at 7:30 am. She said she slipped on the tile and hit her head on the plant stand, but couldn't stand up after. She denies feeling light-headed or dizzy prior to falling, and denies LOC post-fall. She said the pain became apparent later that night in the pelvic area and knees and rated the pain a 9/10. She says she is unable to stand or walk now.  85 y/o female, coming form home, ambulates independently, with PMHx  presents to the ED after she fell coming out of the bathroom and into her bedroom on Saturday (8/6) at 7:30 am. She said she slipped on the tile and hit her head on the plant stand, but couldn't stand up after. She denies feeling light-headed or dizzy prior to falling, and denies LOC post-fall. She said the pain became apparent later that night in the pelvic area and knees and rated the pain a 9/10. She says she is unable to stand or walk now.  87 y/o female, coming form home, ambulates independently, with PMHx of Uterine Cancer (that has metastasized to the lungs), Sciatica, and neuropathy in the legs presents to the ED after she fell coming out of the bathroom and into her bedroom on Saturday (8/6) at 7:30 am. She said she slipped on the tile and hit her head on the plant stand, but couldn't stand up after. She reports crawling to the nearest phone to call her daughter, as she lives alone with no HHA, patient claims to wait for her daughter to help her get up. Patient states that she was on the floor for an unknown amount of time. Patient is AOx2-3 but an unreliable historian, collateral obtained from daughter, Batsheva. Per daughter, she states that patient living conditions are concerning, as she is not able to care for herself. Daughter is overwhelmed, worries that her mother is not following up with her own health and finances. Patient also states that she has difficulty ambulating, but does not think she can walk after fall. Patient denies feeling light-headed or dizzy prior to falling, and denies LOC post-fall. She said the pain became apparent later that night in the pelvic area and knees and rated the pain a 9/10.

## 2022-08-08 NOTE — CONSULT NOTE ADULT - SUBJECTIVE AND OBJECTIVE BOX
Source of information: CHAYO HERNANDEZMARIA ALEJANDRA, Chart review  Patient language: English  : n/a    HPI:  An 87 y/o female presents to the ED after she fell coming out of the bathroom and into her bedroom on Saturday () at 7:30 am. She said she slipped on the tile and hit her head on the plant stand, but couldn't stand up after. She denies feeling light-headed or dizzy prior to falling, and denies LOC post-fall. She said the pain became apparent later that night in the pelvic area and knees and rated the pain a 9/10. She says she is unable to stand or walk now.  (08 Aug 2022 11:23)      Pt is admitted for fall. CT hip negative fx. + degenerative changes. Pain consulted for left hip pain  Pt seen and examined at bedside. Pt laying in bed, reports left hip pain started after her fall on Saturday. Ot state she slipped at home on her floor which had a small amount of water and hit her head and left side on the floor. Reports her pain has improved with Tylenol and laying in bed. Currently denies pain. Pt also reports hx chronic low back pain with b/l sciatica for which she take PRN tylenol and motrin at home. + chronic b/l LE numbness. Pt tolerating PO diet. Denies lethargy, chest pain, SOB, nausea, vomiting, constipation. Reports last BM . Patient stated goal for pain control: to be able to take deep breaths, get out of bed to chair and ambulate with tolerable pain control.     PAST MEDICAL & SURGICAL HISTORY:  Obesity (ICD9 278.00)      systemic edema      Acne Cystica (ICD9 706.1)      CHF (Congestive Heart Failure) (ICD9 428.0)      DENIS (Obstructive Sleep Apnea) (ICD9 327.23)      Endometrial cancer      Sciatica      C Section 1965      cyst removed from the neck 1966      History of Dilatation and Curettage (ICD9 V45.89)      H/O: hysterectomy      S/P partial lobectomy of lung          FAMILY HISTORY:      Social History:  Lives alone; No aid; Uses a cane to walk outside the house (08 Aug 2022 11:23)   [X ] Denies ETOH use, illicit drug use and smoking    Allergies    No Known Allergies    Intolerances    codeine (Vomiting; Short breath; Nausea)      MEDICATIONS  (STANDING):  acetaminophen     Tablet .. 1000 milliGRAM(s) Oral every 8 hours  lidocaine   4% Patch 1 Patch Transdermal daily  pantoprazole    Tablet 40 milliGRAM(s) Oral before breakfast    MEDICATIONS  (PRN):  naproxen 375 milliGRAM(s) Oral every 8 hours PRN Moderate Pain (4 - 6)      Vital Signs Last 24 Hrs  T(C): 37 (08 Aug 2022 12:03), Max: 37 (08 Aug 2022 12:03)  T(F): 98.6 (08 Aug 2022 12:03), Max: 98.6 (08 Aug 2022 12:03)  HR: 77 (08 Aug 2022 12:03) (74 - 110)  BP: 146/54 (08 Aug 2022 12:03) (135/70 - 156/75)  BP(mean): --  RR: 19 (08 Aug 2022 12:03) (17 - 19)  SpO2: 96% (08 Aug 2022 12:) (96% - 96%)    Parameters below as of 08 Aug 2022 12:03  Patient On (Oxygen Delivery Method): room air        LABS: Reviewed.                          12.1   14.74 )-----------( 241      ( 08 Aug 2022 05:19 )             37.4     08-08    144  |  110<H>  |  22<H>  ----------------------------<  138<H>  3.7   |  30  |  0.83    Ca    8.8      08 Aug 2022 05:19    TPro  6.7  /  Alb  2.7<L>  /  TBili  0.8  /  DBili  x   /  AST  73<H>  /  ALT  39  /  AlkPhos  54  08-08      LIVER FUNCTIONS - ( 08 Aug 2022 05:19 )  Alb: 2.7 g/dL / Pro: 6.7 g/dL / ALK PHOS: 54 U/L / ALT: 39 U/L DA / AST: 73 U/L / GGT: x           Urinalysis Basic - ( 08 Aug 2022 05:19 )    Color: Yellow / Appearance: Clear / S.025 / pH: x  Gluc: x / Ketone: Negative  / Bili: Negative / Urobili: Negative   Blood: x / Protein: 100 / Nitrite: Negative   Leuk Esterase: Small / RBC: 10-25 /HPF / WBC 3-5 /HPF   Sq Epi: x / Non Sq Epi: Few /HPF / Bacteria: Few /HPF      CAPILLARY BLOOD GLUCOSE        COVID-19 PCR: NotDetec (08 Aug 2022 05:19)      Radiology: Reviewed.   < from: CT Head No Cont (22 @ 02:11) >    ACC: [id number] EXAM:  CT BRAIN                          PROCEDURE DATE:  2022          INTERPRETATION:  NONCONTRAST CT OF THE BRAIN DATED 2022.    CLINICAL INFORMATION:  head trauma    TECHNIQUE: Axial CT images are obtained from the cranial vertex to the   skull base without the administration of IV contrast. Images are   reformatted in sagittal and coronal planes.    No prior studies are available for comparison.    FINDINGS:    Evaluation through the posterior fossa is degraded by streak artifact   from dental amalgam. There is no gross acute intra-axial or extra-axial   hemorrhage. There is no significant mass effect or shift of the midline.   The basal cisterns are not effaced. There is cerebral and cerebellar   volume loss with prominence of the ventricles, sulci, and cerebellar   folia. There are minimal chronic ischemic changes in the frontoparietal   white matter. There are atherosclerotic calcifications of the   intracranial carotid arteries and intradural vertebral arteries.    There is no significant scalp soft tissue swelling or scalp hematoma. The   skull base and bony calvarium are intact. The visualized paranasal   sinuses and tympanic/mastoid cavities are clear apart from mild bilateral   ethmoid mucosal thickening. There is evidence of bilateral lens surgery.    IMPRESSION:    Evaluation through the posterior fossa degraded by streak artifact from   dental amalgam. No gross acute intracranial hemorrhage, mass effect, or   acute osseous fracture.    ---End of Report ---            DOROTHY LÓPEZ DO; Attending Radiologist  This document has been electronically signed. Aug  8 2022  2:51AM    < end of copied text >    < from: CT Hip No Cont, Left (22 @ 09:03) >    ACC: [id number] EXAM:  CT HIP ONLY LT                          PROCEDURE DATE:  2022          INTERPRETATION:  CT HIP LEFT dated 2022 9:03 AM    INDICATION: Left hip pain after trauma    COMPARISON: Pelvic radiograph dated 2022    TECHNIQUE: CT imaging of the pelvis was performed. The data was   reformatted in the axial, coronal, and sagittal planes. Additionally, 3-D   reformatted imaging was created.    FINDINGS: Mildly degraded by motion artifact.    OSSEOUS STRUCTURES: There is no fracture. There is mild bilateral hip   joint space narrowing. Productive changes of the lower lumbar spine.   Marked narrowing the pubic symphysis with sclerosis and small cystic   change. Subcentimeter bone island the left femoral head.  SYNOVIUM/ JOINT FLUID: No hip joint effusion.  TENDONS: The tendons are intact.  MUSCLES: No muscle hematoma is noted.  NEUROVASCULAR STRUCTURES: Moderate vascular calcifications present.  INTRAPELVIC SOFT TISSUES: Large fat and nonobstructed bowel containing  left abdominal wall hernia.  SUBCUTANEOUS SOFT TISSUES: No soft tissue swelling.    3-D reformatted imaging confirms these findings.    IMPRESSION:    No fracture identified.  Degenerative changes.    --- End of Report ---            LILIA MERAZ MD; Attending Radiologist  This document has been electronically signed. Aug  8 2022  9:35AM    < end of copied text >      ORT Score -   Family Hx of substance abuse	Female	      Male  Alcohol 	                                           1                     3  Illegal drugs	                                   2                     3  Rx drugs                                           4 	                  4  Personal Hx of substance abuse		  Alcohol 	                                          3	                  3  Illegal drugs                                     4	                  4  Rx drugs                                            5 	                  5  Age between 16- 45 years	           1                     1  hx preadolescent sexual abuse	   3 	                  0  Psychological disease		  ADD, OCD, bipolar, schizophrenia   2	          2  Depression                                           1 	          1  Total: 0    a score of 3 or lower indicates low risk for opioid abuse		  a score of 4-7 indicates moderate risk for opioid abuse		  a score of 8 or higher indicates high risk for opioid abuse    REVIEW OF SYSTEMS:  CONSTITUTIONAL: No fever or fatigue  HEENT:  No difficulty hearing, no change in vision  NECK: No pain or stiffness  RESPIRATORY: No cough, wheezing, chills or hemoptysis; No shortness of breath  CARDIOVASCULAR: No chest pain, palpitations, dizziness, or leg swelling  GASTROINTESTINAL: No loss of appetite, decreased PO intake. No abdominal or epigastric pain. No nausea, vomiting; No diarrhea or constipation.   GENITOURINARY: No dysuria, frequency, hematuria, retention or incontinence  MUSCULOSKELETAL: + left hip pain. + chronic back pain with b/l sciatica. No swelling; + decreased ROM b/l upper extremities and left lower extremities; no upper or lower motor strength weakness, no saddle anesthesia, bowel/bladder incontinence, + falls   NEURO: No headaches, + chronic numbness/tingling b/l LE, No weakness    PHYSICAL EXAM:  GENERAL:  Alert & Oriented X4, cooperative, NAD, Good concentration. Speech is clear.   RESPIRATORY: Respirations even and unlabored. Clear to auscultation bilaterally; No rales, rhonchi, wheezing, or rubs  CARDIOVASCULAR: Normal S1/S2, regular rate and rhythm; No murmurs, rubs, or gallops. No JVD.   GASTROINTESTINAL: + obese per BMI, Soft, Nontender, Nondistended; Bowel sounds present  PERIPHERAL VASCULAR:  Extremities warm without edema. 2+ Peripheral Pulses, No cyanosis, No calf tenderness  MUSCULOSKELETAL: Motor Strength 5/5 B/L upper and 3/5 lower extremities; + decreased ROM left LE and b/l upper extremities; negative SLR; + left hip tenderness on palpation.    SKIN: Warm, dry, intact. No rashes, lesions, scars or wounds.     Risk factors associated with adverse outcomes related to opioid treatment  [ ]  Concurrent benzodiazepine use  [ ]  History/ Active substance use or alcohol use disorder  [ ] Psychiatric co-morbidity  [X ] Sleep apnea  [ ] COPD  [X ] BMI> 35  [ ] Liver dysfunction  [ ] Renal dysfunction  [X ] CHF  [ ] Smoker  [X ]  Age > 60 years    [X ]  [place]  Reviewed and Copied to Chart. See below.    Plan of care and goal oriented pain management treatment options were discussed with patient and /or primary care giver; all questions and concerns were addressed and care was aligned with patient's wishes.    Educated patient on goal oriented pain management treatment options

## 2022-08-08 NOTE — H&P ADULT - ATTENDING COMMENTS
87 y/o female, coming form home, ambulates independently, with PMHx of Uterine Cancer (that has metastasized to the lungs), Sciatica, and neuropathy in the legs presents to the ED after she fell coming out of the bathroom and into her bedroom on Saturday (8/6) at 7:30 am. She said she slipped on the tile and hit her head on the plant stand, but couldn't stand up after. She reports crawling to the nearest phone to call her daughter, as she lives alone with no HHA, patient claims to wait for her daughter to help her get up. Patient states that she was on the floor for an unknown amount of time. Patient is AOx2-3 but an unreliable historian, collateral obtained from daughter, Batsheva. Per daughter, she states that patient living conditions are concerning, as she is not able to care for herself. Daughter is overwhelmed, worries that her mother is not following up with her own health and finances. Patient also states that she has difficulty ambulating, but does not think she can walk after fall. Patient denies feeling light-headed or dizzy prior to falling, and denies LOC post-fall. She said the pain became apparent later that night in the pelvic area and knees and rated the pain a 9/10.     # S/P MECHANICAL FALL W/ TRAUMA TO POSTERIOR SCALP AND HIP  # AMBULATORY DYSFUNCTION  # HX OF SCIATICA  - NOTED CT HEAD AND HIP  - F/U ORTHOSTATIC VITALS  - F/U PT EVAL  - PAIN MANAGEMENT CONSULT    # R/O RHABDOMYOLYSIS  - F/U CPK    # LEUKOCYTOSIS - ? REACTIVE  - DENIES DYSURIA, COUGH, RASHES, N/V/C/D, ABDOMINAL PAIN    # GI AND DVT PPX 85 y/o female, coming form home, ambulates independently, with PMHx of Uterine Cancer (that has metastasized to the lungs), Sciatica, and neuropathy in the legs presents to the ED after she fell coming out of the bathroom and into her bedroom on Saturday (8/6) at 7:30 am. She said she slipped on the tile and hit her head on the plant stand, but couldn't stand up after. She reports crawling to the nearest phone to call her daughter, as she lives alone with no HHA, patient claims to wait for her daughter to help her get up. Patient states that she was on the floor for an unknown amount of time. Patient is AOx2-3 but an unreliable historian, collateral obtained from daughter, Batsheva. Per daughter, she states that patient living conditions are concerning, as she is not able to care for herself. Daughter is overwhelmed, worries that her mother is not following up with her own health and finances. Patient also states that she has difficulty ambulating, but does not think she can walk after fall. Patient denies feeling light-headed or dizzy prior to falling, and denies LOC post-fall. She said the pain became apparent later that night in the pelvic area and knees and rated the pain a 9/10.     # S/P MECHANICAL FALL W/ TRAUMA TO POSTERIOR SCALP AND HIP  # AMBULATORY DYSFUNCTION  # HX OF SCIATICA  - NOTED CT HEAD AND HIP  - F/U ORTHOSTATIC VITALS  - F/U PT EVAL  - PAIN MANAGEMENT CONSULT    # R/O RHABDOMYOLYSIS  - F/U CPK    # LEUKOCYTOSIS - ? REACTIVE  - DENIES DYSURIA, COUGH, RASHES, N/V/C/D, ABDOMINAL PAIN    # ABDOMINAL WALL HERNIA    # GI AND DVT PPX

## 2022-08-08 NOTE — ED ADULT NURSE NOTE - OBJECTIVE STATEMENT
BIBA c/o bilateral lower extremities pain and left hip pain, s/p mechanical fall at home. Pt verbalized hit back of head during fall

## 2022-08-08 NOTE — H&P ADULT - PROBLEM SELECTOR PLAN 1
s/p fall, no LOC  Unknown exact down time (estimate of at least 24 hrs based on daughter's hx)   U/A: Blood in urine, hyaline casts 0-2  CK - elevated (1,882)  Administer fluids and monitor the CK level s/p fall, no LOC  Unknown exact down time (estimate of at least 24 hrs based on daughter's hx)   U/A: Blood in urine, hyaline casts 0-2  CK - elevated (1,882)  c/w IV fluids   monitor the CK level in the AM

## 2022-08-08 NOTE — H&P ADULT - NSICDXPASTSURGICALHX_GEN_ALL_CORE_FT
PAST SURGICAL HISTORY:  C Section 1965     cyst removed from the neck 1966     H/O: hysterectomy     History of Dilatation and Curettage (ICD9 V45.89)     S/P partial lobectomy of lung

## 2022-08-08 NOTE — H&P ADULT - NSICDXPASTMEDICALHX_GEN_ALL_CORE_FT
PAST MEDICAL HISTORY:  Acne Cystica (ICD9 706.1)     CHF (Congestive Heart Failure) (ICD9 428.0)     Endometrial cancer     Obesity (ICD9 278.00)     DENIS (Obstructive Sleep Apnea) (ICD9 327.23)     Sciatica     systemic edema

## 2022-08-08 NOTE — H&P ADULT - PROBLEM SELECTOR PLAN 3
- Consult PT  - fall precautions s/p mechanical fall with unknown down time   imaging negative for fractures   Fall precautions  PT consulted

## 2022-08-08 NOTE — CONSULT NOTE ADULT - PROBLEM SELECTOR RECOMMENDATION 9
Pt with acute left hip pain which is somatic in nature due to fall. CT hip negative fx. + degenerative changes. + hx chronic low back pain with b/l sciatica and neuropathy.   High risk medications reviewed. Avoid polypharmacy. Avoid IV opioids. Avoid benzodiazepines. Non-pharmacological sleep aides initiated. Non-opioid medications and non-pharmacological pain management measures initiated.   Pt with allergy to codeine- SOB.   Maximize non-opioid pain recommendations   - Acetaminophen 1 gram PO q 8 hours x 3 days  - Naproxen 375mg PO q8h PRN moderate pain   - Pt refused gabapentin   - Lidoderm 4% patch daily.   Bowel Regimen  - Per primary team  Mild pain   - Non-pharmacological pain treatment recommendations  - Warm/ Cool packs PRN   - Repositioning extremity, imagery, relaxation, distraction.  - Physical therapy OOB if no contraindications   Recommendations discussed with primary team and RN

## 2022-08-08 NOTE — ED ADULT NURSE NOTE - ED STAT RN HANDOFF DETAILS 2
Report given to JOSSELYN Conti. Pt resting in bed, no acute distress noted, denies chest pain, no shortness of breath indicated.

## 2022-08-08 NOTE — H&P ADULT - PROBLEM SELECTOR PLAN 4
- f/u with oncologist; pt states she is in remission after metastasis to the lungs h/o endometrial CA with mets , pt states she is in remission after metastasis to the lungs  follow w/ o/p oncologist

## 2022-08-08 NOTE — ED ADULT NURSE REASSESSMENT NOTE - NS ED NURSE REASSESS COMMENT FT1
Pt resting in bed, A&Ox3, awaiting admission, no acute distress noted, denies chest pain, no shortness of breath indicated.

## 2022-08-09 DIAGNOSIS — M54.9 DORSALGIA, UNSPECIFIED: ICD-10-CM

## 2022-08-09 DIAGNOSIS — M25.552 PAIN IN LEFT HIP: ICD-10-CM

## 2022-08-09 DIAGNOSIS — I50.9 HEART FAILURE, UNSPECIFIED: ICD-10-CM

## 2022-08-09 DIAGNOSIS — F41.9 ANXIETY DISORDER, UNSPECIFIED: ICD-10-CM

## 2022-08-09 DIAGNOSIS — E66.9 OBESITY, UNSPECIFIED: ICD-10-CM

## 2022-08-09 LAB
ALBUMIN SERPL ELPH-MCNC: 2.1 G/DL — LOW (ref 3.5–5)
ALP SERPL-CCNC: 47 U/L — SIGNIFICANT CHANGE UP (ref 40–120)
ALT FLD-CCNC: 38 U/L DA — SIGNIFICANT CHANGE UP (ref 10–60)
ANION GAP SERPL CALC-SCNC: 7 MMOL/L — SIGNIFICANT CHANGE UP (ref 5–17)
AST SERPL-CCNC: 41 U/L — HIGH (ref 10–40)
BASOPHILS # BLD AUTO: 0.03 K/UL — SIGNIFICANT CHANGE UP (ref 0–0.2)
BASOPHILS NFR BLD AUTO: 0.4 % — SIGNIFICANT CHANGE UP (ref 0–2)
BILIRUB SERPL-MCNC: 0.4 MG/DL — SIGNIFICANT CHANGE UP (ref 0.2–1.2)
BUN SERPL-MCNC: 14 MG/DL — SIGNIFICANT CHANGE UP (ref 7–18)
CALCIUM SERPL-MCNC: 8.7 MG/DL — SIGNIFICANT CHANGE UP (ref 8.4–10.5)
CHLORIDE SERPL-SCNC: 111 MMOL/L — HIGH (ref 96–108)
CHOLEST SERPL-MCNC: 160 MG/DL — SIGNIFICANT CHANGE UP
CK SERPL-CCNC: 678 U/L — HIGH (ref 21–215)
CO2 SERPL-SCNC: 25 MMOL/L — SIGNIFICANT CHANGE UP (ref 22–31)
CREAT SERPL-MCNC: 0.55 MG/DL — SIGNIFICANT CHANGE UP (ref 0.5–1.3)
CULTURE RESULTS: SIGNIFICANT CHANGE UP
EGFR: 90 ML/MIN/1.73M2 — SIGNIFICANT CHANGE UP
EOSINOPHIL # BLD AUTO: 0.5 K/UL — SIGNIFICANT CHANGE UP (ref 0–0.5)
EOSINOPHIL NFR BLD AUTO: 6.5 % — HIGH (ref 0–6)
GLUCOSE SERPL-MCNC: 91 MG/DL — SIGNIFICANT CHANGE UP (ref 70–99)
HCT VFR BLD CALC: 34.7 % — SIGNIFICANT CHANGE UP (ref 34.5–45)
HDLC SERPL-MCNC: 39 MG/DL — LOW
HGB BLD-MCNC: 10.9 G/DL — LOW (ref 11.5–15.5)
IMM GRANULOCYTES NFR BLD AUTO: 0.4 % — SIGNIFICANT CHANGE UP (ref 0–1.5)
LIPID PNL WITH DIRECT LDL SERPL: 98 MG/DL — SIGNIFICANT CHANGE UP
LYMPHOCYTES # BLD AUTO: 1.77 K/UL — SIGNIFICANT CHANGE UP (ref 1–3.3)
LYMPHOCYTES # BLD AUTO: 22.9 % — SIGNIFICANT CHANGE UP (ref 13–44)
MCHC RBC-ENTMCNC: 30.6 PG — SIGNIFICANT CHANGE UP (ref 27–34)
MCHC RBC-ENTMCNC: 31.4 GM/DL — LOW (ref 32–36)
MCV RBC AUTO: 97.5 FL — SIGNIFICANT CHANGE UP (ref 80–100)
MONOCYTES # BLD AUTO: 0.58 K/UL — SIGNIFICANT CHANGE UP (ref 0–0.9)
MONOCYTES NFR BLD AUTO: 7.5 % — SIGNIFICANT CHANGE UP (ref 2–14)
NEUTROPHILS # BLD AUTO: 4.81 K/UL — SIGNIFICANT CHANGE UP (ref 1.8–7.4)
NEUTROPHILS NFR BLD AUTO: 62.3 % — SIGNIFICANT CHANGE UP (ref 43–77)
NON HDL CHOLESTEROL: 121 MG/DL — SIGNIFICANT CHANGE UP
NRBC # BLD: 0 /100 WBCS — SIGNIFICANT CHANGE UP (ref 0–0)
PLATELET # BLD AUTO: 206 K/UL — SIGNIFICANT CHANGE UP (ref 150–400)
POTASSIUM SERPL-MCNC: 3.7 MMOL/L — SIGNIFICANT CHANGE UP (ref 3.5–5.3)
POTASSIUM SERPL-SCNC: 3.7 MMOL/L — SIGNIFICANT CHANGE UP (ref 3.5–5.3)
PROT SERPL-MCNC: 6 G/DL — SIGNIFICANT CHANGE UP (ref 6–8.3)
RBC # BLD: 3.56 M/UL — LOW (ref 3.8–5.2)
RBC # FLD: 13.2 % — SIGNIFICANT CHANGE UP (ref 10.3–14.5)
SODIUM SERPL-SCNC: 143 MMOL/L — SIGNIFICANT CHANGE UP (ref 135–145)
SPECIMEN SOURCE: SIGNIFICANT CHANGE UP
TRIGL SERPL-MCNC: 115 MG/DL — SIGNIFICANT CHANGE UP
WBC # BLD: 7.72 K/UL — SIGNIFICANT CHANGE UP (ref 3.8–10.5)
WBC # FLD AUTO: 7.72 K/UL — SIGNIFICANT CHANGE UP (ref 3.8–10.5)

## 2022-08-09 PROCEDURE — 99232 SBSQ HOSP IP/OBS MODERATE 35: CPT

## 2022-08-09 RX ORDER — ESCITALOPRAM OXALATE 10 MG/1
15 TABLET, FILM COATED ORAL DAILY
Refills: 0 | Status: DISCONTINUED | OUTPATIENT
Start: 2022-08-09 | End: 2022-08-12

## 2022-08-09 RX ORDER — BENZOCAINE AND MENTHOL 5; 1 G/100ML; G/100ML
1 LIQUID ORAL THREE TIMES A DAY
Refills: 0 | Status: DISCONTINUED | OUTPATIENT
Start: 2022-08-09 | End: 2022-08-12

## 2022-08-09 RX ORDER — POLYETHYLENE GLYCOL 3350 17 G/17G
17 POWDER, FOR SOLUTION ORAL DAILY
Refills: 0 | Status: DISCONTINUED | OUTPATIENT
Start: 2022-08-09 | End: 2022-08-12

## 2022-08-09 RX ORDER — ASCORBIC ACID 60 MG
500 TABLET,CHEWABLE ORAL DAILY
Refills: 0 | Status: DISCONTINUED | OUTPATIENT
Start: 2022-08-09 | End: 2022-08-12

## 2022-08-09 RX ORDER — BUMETANIDE 0.25 MG/ML
2 INJECTION INTRAMUSCULAR; INTRAVENOUS DAILY
Refills: 0 | Status: DISCONTINUED | OUTPATIENT
Start: 2022-08-09 | End: 2022-08-12

## 2022-08-09 RX ORDER — SENNA PLUS 8.6 MG/1
1 TABLET ORAL AT BEDTIME
Refills: 0 | Status: DISCONTINUED | OUTPATIENT
Start: 2022-08-09 | End: 2022-08-12

## 2022-08-09 RX ORDER — CHOLECALCIFEROL (VITAMIN D3) 125 MCG
400 CAPSULE ORAL DAILY
Refills: 0 | Status: DISCONTINUED | OUTPATIENT
Start: 2022-08-09 | End: 2022-08-12

## 2022-08-09 RX ORDER — LEVOTHYROXINE SODIUM 125 MCG
50 TABLET ORAL DAILY
Refills: 0 | Status: DISCONTINUED | OUTPATIENT
Start: 2022-08-09 | End: 2022-08-12

## 2022-08-09 RX ORDER — SODIUM CHLORIDE 9 MG/ML
1000 INJECTION INTRAMUSCULAR; INTRAVENOUS; SUBCUTANEOUS
Refills: 0 | Status: DISCONTINUED | OUTPATIENT
Start: 2022-08-09 | End: 2022-08-12

## 2022-08-09 RX ADMIN — PANTOPRAZOLE SODIUM 40 MILLIGRAM(S): 20 TABLET, DELAYED RELEASE ORAL at 06:10

## 2022-08-09 RX ADMIN — ENOXAPARIN SODIUM 40 MILLIGRAM(S): 100 INJECTION SUBCUTANEOUS at 06:12

## 2022-08-09 RX ADMIN — SODIUM CHLORIDE 85 MILLILITER(S): 9 INJECTION INTRAMUSCULAR; INTRAVENOUS; SUBCUTANEOUS at 18:25

## 2022-08-09 RX ADMIN — Medication 1000 MILLIGRAM(S): at 15:46

## 2022-08-09 RX ADMIN — Medication 1 TABLET(S): at 13:31

## 2022-08-09 RX ADMIN — LIDOCAINE 1 PATCH: 4 CREAM TOPICAL at 01:43

## 2022-08-09 RX ADMIN — Medication 1000 MILLIGRAM(S): at 06:09

## 2022-08-09 RX ADMIN — LIDOCAINE 1 PATCH: 4 CREAM TOPICAL at 23:12

## 2022-08-09 RX ADMIN — Medication 3 MILLIGRAM(S): at 23:13

## 2022-08-09 RX ADMIN — Medication 500 MILLIGRAM(S): at 13:31

## 2022-08-09 RX ADMIN — Medication 1000 MILLIGRAM(S): at 23:12

## 2022-08-09 RX ADMIN — Medication 1000 MILLIGRAM(S): at 16:15

## 2022-08-09 RX ADMIN — SENNA PLUS 1 TABLET(S): 8.6 TABLET ORAL at 23:13

## 2022-08-09 RX ADMIN — Medication 400 UNIT(S): at 13:31

## 2022-08-09 RX ADMIN — ESCITALOPRAM OXALATE 15 MILLIGRAM(S): 10 TABLET, FILM COATED ORAL at 13:31

## 2022-08-09 RX ADMIN — Medication 1000 MILLIGRAM(S): at 06:12

## 2022-08-09 RX ADMIN — SODIUM CHLORIDE 85 MILLILITER(S): 9 INJECTION INTRAMUSCULAR; INTRAVENOUS; SUBCUTANEOUS at 06:13

## 2022-08-09 NOTE — PHYSICAL THERAPY INITIAL EVALUATION ADULT - GENERAL OBSERVATIONS, REHAB EVAL
Consult received ,EMR, radiology and labs reviewed. Patient received supine in bed, NAD, states feel better. Patient agreed to EVALUATION from Physical Therapist.

## 2022-08-09 NOTE — PATIENT PROFILE ADULT - FALL HARM RISK - HARM RISK INTERVENTIONS

## 2022-08-09 NOTE — PROGRESS NOTE ADULT - PROBLEM SELECTOR PLAN 1
Pt with acute left hip pain which is somatic in nature due to fall. CT hip negative fx. + degenerative changes. + hx chronic low back pain with b/l sciatica and neuropathy. + hx anxiety, restarted home lexapro 15mg PO daily, verified by pharmacy.   High risk medications reviewed. Avoid polypharmacy. Avoid IV opioids. Avoid benzodiazepines. Non-pharmacological sleep aides initiated. Non-opioid medications and non-pharmacological pain management measures initiated.   Pt with allergy to codeine- SOB.   Maximize non-opioid pain recommendations   - Continue Acetaminophen 1 gram PO q 8 hours x 3 days  - Continue Naproxen 375mg PO q8h PRN moderate pain   - Pt refused gabapentin   - Continue Lidoderm 4% patch daily.   Bowel Regimen  - Per primary team  Mild pain   - Non-pharmacological pain treatment recommendations  - Warm/ Cool packs PRN   - Repositioning extremity, imagery, relaxation, distraction.  - Physical therapy OOB if no contraindications   Recommendations discussed with primary team and RN.

## 2022-08-09 NOTE — PROGRESS NOTE ADULT - SUBJECTIVE AND OBJECTIVE BOX
NP Note discussed with  primary attending    Patient is a 86y old  Female who presents with a chief complaint of Fall (09 Aug 2022 11:32)      INTERVAL HPI/OVERNIGHT EVENTS: Pt reports inability to stand on legs b/c her left leg starts to bend.  Pt denies pain at this time.        MEDICATIONS  (STANDING):  acetaminophen     Tablet .. 1000 milliGRAM(s) Oral every 8 hours  ascorbic acid 500 milliGRAM(s) Oral daily  buMETAnide 2 milliGRAM(s) Oral daily  cholecalciferol 400 Unit(s) Oral daily  enoxaparin Injectable 40 milliGRAM(s) SubCutaneous every 24 hours  escitalopram 15 milliGRAM(s) Oral daily  levothyroxine 50 MICROGram(s) Oral daily  lidocaine   4% Patch 1 Patch Transdermal daily  multivitamin 1 Tablet(s) Oral daily  pantoprazole    Tablet 40 milliGRAM(s) Oral before breakfast  polyethylene glycol 3350 17 Gram(s) Oral daily  senna 1 Tablet(s) Oral at bedtime  sodium chloride 0.9%. 1000 milliLiter(s) (85 mL/Hr) IV Continuous <Continuous>    MEDICATIONS  (PRN):  benzocaine 15 mG/menthol 3.6 mG Lozenge 1 Lozenge Oral three times a day PRN Mouth Sores  melatonin 3 milliGRAM(s) Oral at bedtime PRN Insomnia  naproxen 375 milliGRAM(s) Oral every 8 hours PRN Moderate Pain (4 - 6)  ondansetron Injectable 4 milliGRAM(s) IV Push every 8 hours PRN Nausea and/or Vomiting      __________________________________________________  REVIEW OF SYSTEMS:    CONSTITUTIONAL: No fever  EYES: No acute visual disturbances  NECK: No pain or stiffness  RESPIRATORY: No cough; No shortness of breath  CARDIOVASCULAR: No chest pain, no palpitations  GASTROINTESTINAL: No pain. No nausea or vomiting.  No diarrhea   NEUROLOGICAL: No headache or numbness, no tremors  MUSCULOSKELETAL: No joint pain, no muscle pain  GENITOURINARY: No dysuria, no frequency, no hesitancy  PSYCHIATRY: No depression , no anxiety  ALL OTHER  ROS negative        Vital Signs Last 24 Hrs  T(C): 36.6 (09 Aug 2022 13:20), Max: 36.9 (08 Aug 2022 20:56)  T(F): 97.9 (09 Aug 2022 13:20), Max: 98.4 (08 Aug 2022 20:56)  HR: 72 (09 Aug 2022 13:20) (60 - 72)  BP: 133/71 (09 Aug 2022 13:20) (116/68 - 137/57)  RR: 18 (09 Aug 2022 13:20) (16 - 18)  SpO2: 96% (09 Aug 2022 13:20) (94% - 97%)    Parameters below as of 09 Aug 2022 13:20  Patient On (Oxygen Delivery Method): room air        ________________________________________________  PHYSICAL EXAM:  GENERAL: NAD, obese  HEENT: Normocephalic;  conjunctivae and sclerae clear; moist mucous membranes;   NECK : Supple  CHEST/LUNG: Clear to auscultation bilaterally with good air entry   HEART: S1 S2  regular; no murmurs, gallops or rubs  ABDOMEN: Soft, Nontender, Nondistended; Bowel sounds present x 4 quad  EXTREMITIES: No cyanosis; no edema; no calf tenderness.  Unable to lift leg leg to 90 degrees.  Able to raise right leg to 90 degrees.    SKIN: Warm and dry; no rash  NERVOUS SYSTEM:  Awake and alert; Oriented  to place, person and time ; no new deficits    _________________________________________________  LABS:                        10.9   7.72  )-----------( 206      ( 09 Aug 2022 08:06 )             34.7     08-09    143  |  111<H>  |  14  ----------------------------<  91  3.7   |  25  |  0.55    Ca    8.7      09 Aug 2022 08:06    TPro  6.0  /  Alb  2.1<L>  /  TBili  0.4  /  DBili  x   /  AST  41<H>  /  ALT  38  /  AlkPhos  47  08-09      Urinalysis Basic - ( 08 Aug 2022 05:19 )    Color: Yellow / Appearance: Clear / S.025 / pH: x  Gluc: x / Ketone: Negative  / Bili: Negative / Urobili: Negative   Blood: x / Protein: 100 / Nitrite: Negative   Leuk Esterase: Small / RBC: 10-25 /HPF / WBC 3-5 /HPF   Sq Epi: x / Non Sq Epi: Few /HPF / Bacteria: Few /HPF      CAPILLARY BLOOD GLUCOSE            RADIOLOGY & ADDITIONAL TESTS:    Imaging Personally Reviewed:  YES    Consultant(s) Notes Reviewed:   YES    Care Discussed with Consultants :     Plan of care was discussed with patient and /or primary care giver; all questions and concerns were addressed and care was aligned with patient's wishes.

## 2022-08-09 NOTE — PATIENT PROFILE ADULT - FALL HARM RISK - FALL HARM RISK
PRINCIPAL DISCHARGE DIAGNOSIS  Diagnosis: Coronary artery disease  Assessment and Plan of Treatment: You underwent a cardiac catheterization on  12/26/19 and had a stent placed in your right coronary artery. Please take aspirin 81mg and Plavix 75mg daily. Your procedure was done through your wrist. You do not need to keep this area covered and you may shower. Please avoid any heavy lifting  (no more than 3 to 5 lbs) or strenuous activity for five days. If you develop any swelling, bleeding, hardening of the skin (hematoma formation), acute pain, numbness/tingling  in your wrist please contact your doctor immediately or call our 24/7 line: [phone].   NEVER MISS A DOSE OF ASPIRIN OR PLAVIX; IF YOU DO, YOU ARE AT RISK OF YOUR STENT CLOSING AND HAVING A HEART ATTACK. DO NOT STOP THESE TWO MEDICATIONS UNLESS INSTRUCTED TO DO SO BY YOUR CARDIOLOGIST  Follow up with Dr. Terry in 1-2 weeks. Bone Condition

## 2022-08-09 NOTE — PROGRESS NOTE ADULT - SUBJECTIVE AND OBJECTIVE BOX
Source of information: CHAYO BELLAMYZAN, Chart review  Patient language: English  : n/a    HPI:  An 85 y/o female presents to the ED after she fell coming out of the bathroom and into her bedroom on Saturday () at 7:30 am. She said she slipped on the tile and hit her head on the plant stand, but couldn't stand up after. She denies feeling light-headed or dizzy prior to falling, and denies LOC post-fall. She said the pain became apparent later that night in the pelvic area and knees and rated the pain a 9/10. She says she is unable to stand or walk now.  (08 Aug 2022 11:23)      Pt is admitted for fall. CT hip negative fx. + degenerative changes. Pain consulted for left hip pain. Reports left hip pain started after her fall on Saturday, states she slipped at home on her floor which had a small amount of water and hit her head and left side on the floor.   Pt seen and examined at bedside. Pt laying in bed, reports her pain has improved with current pain regimen. Reports left hip pain 5/10, aching, radiating to right groin, alleviated by rest and pain medications, exacerbated by movement. Pt also reports hx chronic low back pain with b/l sciatica for which she take PRN tylenol and motrin at home. + chronic b/l LE numbness. Pt tolerating PO diet. Denies lethargy, chest pain, SOB, nausea, vomiting, constipation. Reports last BM . Reports hx anxiety and requesting her home lexapro dose and vitamins. Patient stated goal for pain control: to be able to take deep breaths, get out of bed to chair and ambulate with tolerable pain control. Pt has not yet been out of bed. Pending PT consult.     PAST MEDICAL & SURGICAL HISTORY:  Obesity (ICD9 278.00)      systemic edema      Acne Cystica (ICD9 706.1)      CHF (Congestive Heart Failure) (ICD9 428.0)      DENIS (Obstructive Sleep Apnea) (ICD9 327.23)      Endometrial cancer      Sciatica      C Section       cyst removed from the neck 1966      History of Dilatation and Curettage (ICD9 V45.89)      H/O: hysterectomy      S/P partial lobectomy of lung          FAMILY HISTORY:      Social History:  Lives alone; No aid; Uses a cane to walk outside the house (08 Aug 2022 11:23)   [X ] Denies ETOH use, illicit drug use and smoking    Allergies    No Known Allergies    Intolerances    codeine (Vomiting; Short breath; Nausea)    MEDICATIONS  (STANDING):  acetaminophen     Tablet .. 1000 milliGRAM(s) Oral every 8 hours  ascorbic acid 500 milliGRAM(s) Oral daily  buMETAnide 2 milliGRAM(s) Oral daily  cholecalciferol 400 Unit(s) Oral daily  enoxaparin Injectable 40 milliGRAM(s) SubCutaneous every 24 hours  escitalopram 15 milliGRAM(s) Oral daily  levothyroxine 50 MICROGram(s) Oral daily  lidocaine   4% Patch 1 Patch Transdermal daily  multivitamin 1 Tablet(s) Oral daily  pantoprazole    Tablet 40 milliGRAM(s) Oral before breakfast  sodium chloride 0.9%. 1000 milliLiter(s) (85 mL/Hr) IV Continuous <Continuous>    MEDICATIONS  (PRN):  melatonin 3 milliGRAM(s) Oral at bedtime PRN Insomnia  naproxen 375 milliGRAM(s) Oral every 8 hours PRN Moderate Pain (4 - 6)  ondansetron Injectable 4 milliGRAM(s) IV Push every 8 hours PRN Nausea and/or Vomiting      Vital Signs Last 24 Hrs  T(C): 36.6 (09 Aug 2022 04:50), Max: 37 (08 Aug 2022 12:03)  T(F): 97.9 (09 Aug 2022 04:50), Max: 98.6 (08 Aug 2022 12:03)  HR: 60 (09 Aug 2022 04:50) (60 - 77)  BP: 128/63 (09 Aug 2022 04:50) (116/68 - 146/54)  BP(mean): --  RR: 16 (09 Aug 2022 04:50) (16 - 19)  SpO2: 96% (09 Aug 2022 04:50) (94% - 97%)    Parameters below as of 09 Aug 2022 01:05  Patient On (Oxygen Delivery Method): room air      COVID-19 PCR: NotDetec (08 Aug 2022 05:19)    LABS: Reviewed                          10.9   7.72  )-----------( 206      ( 09 Aug 2022 08:06 )             34.7     08-09    143  |  111<H>  |  14  ----------------------------<  91  3.7   |  25  |  0.55    Ca    8.7      09 Aug 2022 08:06    TPro  6.0  /  Alb  2.1<L>  /  TBili  0.4  /  DBili  x   /  AST  41<H>  /  ALT  38  /  AlkPhos  47  08-09      LIVER FUNCTIONS - ( 09 Aug 2022 08:06 )  Alb: 2.1 g/dL / Pro: 6.0 g/dL / ALK PHOS: 47 U/L / ALT: 38 U/L DA / AST: 41 U/L / GGT: x           Urinalysis Basic - ( 08 Aug 2022 05:19 )    Color: Yellow / Appearance: Clear / S.025 / pH: x  Gluc: x / Ketone: Negative  / Bili: Negative / Urobili: Negative   Blood: x / Protein: 100 / Nitrite: Negative   Leuk Esterase: Small / RBC: 10-25 /HPF / WBC 3-5 /HPF   Sq Epi: x / Non Sq Epi: Few /HPF / Bacteria: Few /HPF    Radiology: Reviewed.   < from: CT Head No Cont (22 @ 02:11) >    ACC: [id number] EXAM:  CT BRAIN                          PROCEDURE DATE:  2022          INTERPRETATION:  NONCONTRAST CT OF THE BRAIN DATED 2022.    CLINICAL INFORMATION:  head trauma    TECHNIQUE: Axial CT images are obtained from the cranial vertex to the   skull base without the administration of IV contrast. Images are   reformatted in sagittal and coronal planes.    No prior studies are available for comparison.    FINDINGS:    Evaluation through the posterior fossa is degraded by streak artifact   from dental amalgam. There is no gross acute intra-axial or extra-axial   hemorrhage. There is no significant mass effect or shift of the midline.   The basal cisterns are not effaced. There is cerebral and cerebellar   volume loss with prominence of the ventricles, sulci, and cerebellar   folia. There are minimal chronic ischemic changes in the frontoparietal   white matter. There are atherosclerotic calcifications of the   intracranial carotid arteries and intradural vertebral arteries.    There is no significant scalp soft tissue swelling or scalp hematoma. The   skull base and bony calvarium are intact. The visualized paranasal   sinuses and tympanic/mastoid cavities are clear apart from mild bilateral   ethmoid mucosal thickening. There is evidence of bilateral lens surgery.    IMPRESSION:    Evaluation through the posterior fossa degraded by streak artifact from   dental amalgam. No gross acute intracranial hemorrhage, mass effect, or   acute osseous fracture.    ---End of Report ---            DOROTHY LÓPEZ DO; Attending Radiologist  This document has been electronically signed. Aug  8 2022  2:51AM    < end of copied text >    < from: CT Hip No Cont, Left (22 @ 09:03) >    ACC: [id number] EXAM:  CT HIP ONLY LT                          PROCEDURE DATE:  2022          INTERPRETATION:  CT HIP LEFT dated 2022 9:03 AM    INDICATION: Left hip pain after trauma    COMPARISON: Pelvic radiograph dated 2022    TECHNIQUE: CT imaging of the pelvis was performed. The data was   reformatted in the axial, coronal, and sagittal planes. Additionally, 3-D   reformatted imaging was created.    FINDINGS: Mildly degraded by motion artifact.    OSSEOUS STRUCTURES: There is no fracture. There is mild bilateral hip   joint space narrowing. Productive changes of the lower lumbar spine.   Marked narrowing the pubic symphysis with sclerosis and small cystic   change. Subcentimeter bone island the left femoral head.  SYNOVIUM/ JOINT FLUID: No hip joint effusion.  TENDONS: The tendons are intact.  MUSCLES: No muscle hematoma is noted.  NEUROVASCULAR STRUCTURES: Moderate vascular calcifications present.  INTRAPELVIC SOFT TISSUES: Large fat and nonobstructed bowel containing  left abdominal wall hernia.  SUBCUTANEOUS SOFT TISSUES: No soft tissue swelling.    3-D reformatted imaging confirms these findings.    IMPRESSION:    No fracture identified.  Degenerative changes.    --- End of Report ---            LILIA MERAZ MD; Attending Radiologist  This document has been electronically signed. Aug  8 2022  9:35AM    < end of copied text >      ORT Score -   Family Hx of substance abuse	Female	      Male  Alcohol 	                                           1                     3  Illegal drugs	                                   2                     3  Rx drugs                                           4 	                  4  Personal Hx of substance abuse		  Alcohol 	                                          3	                  3  Illegal drugs                                     4	                  4  Rx drugs                                            5 	                  5  Age between 16- 45 years	           1                     1  hx preadolescent sexual abuse	   3 	                  0  Psychological disease		  ADD, OCD, bipolar, schizophrenia   2	          2  Depression                                           1 	          1  Total: 0    a score of 3 or lower indicates low risk for opioid abuse		  a score of 4-7 indicates moderate risk for opioid abuse		  a score of 8 or higher indicates high risk for opioid abuse    REVIEW OF SYSTEMS:  CONSTITUTIONAL: No fever or fatigue  HEENT:  No difficulty hearing, no change in vision  NECK: No pain or stiffness  RESPIRATORY: No cough, wheezing, chills or hemoptysis; No shortness of breath  CARDIOVASCULAR: No chest pain, palpitations, dizziness, or leg swelling  GASTROINTESTINAL: No loss of appetite, decreased PO intake. No abdominal or epigastric pain. No nausea, vomiting; No diarrhea or constipation. + abdominal hernia   GENITOURINARY: No dysuria, frequency, hematuria, retention or incontinence  MUSCULOSKELETAL: + left hip pain. + chronic back pain with b/l sciatica. No swelling; + decreased ROM b/l upper extremities and left lower extremities; no upper or lower motor strength weakness, no saddle anesthesia, bowel/bladder incontinence, + falls   NEURO: No headaches, + chronic numbness/tingling b/l LE, No weakness  PSYCH: hx anxiety    PHYSICAL EXAM:  GENERAL:  Alert & Oriented X4, cooperative, NAD, Good concentration. Speech is clear.   RESPIRATORY: Respirations even and unlabored. Clear to auscultation bilaterally; No rales, rhonchi, wheezing, or rubs  CARDIOVASCULAR: Normal S1/S2, regular rate and rhythm; No murmurs, rubs, or gallops. No JVD.   GASTROINTESTINAL: + obese per BMI, hernia, Soft, Nontender, Nondistended; Bowel sounds present  PERIPHERAL VASCULAR:  Extremities warm without edema. 2+ Peripheral Pulses, No cyanosis, No calf tenderness  MUSCULOSKELETAL: Motor Strength 5/5 B/L upper and 3/5 lower extremities; + decreased ROM left LE and b/l upper extremities; negative SLR; + left hip tenderness on palpation.    SKIN: Warm, dry, intact. No rashes, lesions, scars or wounds.     Risk factors associated with adverse outcomes related to opioid treatment  [ ]  Concurrent benzodiazepine use  [ ]  History/ Active substance use or alcohol use disorder  [X ] Psychiatric co-morbidity  [X ] Sleep apnea  [ ] COPD  [X ] BMI> 35  [ ] Liver dysfunction  [ ] Renal dysfunction  [X ] CHF  [ ] Smoker  [X ]  Age > 60 years    [X ]  [place]  Reviewed and Copied to Chart. See below.    Plan of care and goal oriented pain management treatment options were discussed with patient and /or primary care giver; all questions and concerns were addressed and care was aligned with patient's wishes.    Educated patient on goal oriented pain management treatment options     22 @ 11:32

## 2022-08-09 NOTE — PROGRESS NOTE ADULT - SUBJECTIVE AND OBJECTIVE BOX
Patient is a 86y old  Female who presents with a chief complaint of CC: Fall (08 Aug 2022 11:58)    PATIENT IS SEEN AND EXAMINED IN MEDICAL FLOOR.  NGT [    ]    BROOKE [   ]      GT [   ]    ALLERGIES:  codeine (Vomiting; Short breath; Nausea)  No Known Allergies      Daily     Daily     VITALS:    Vital Signs Last 24 Hrs  T(C): 36.6 (09 Aug 2022 04:50), Max: 37 (08 Aug 2022 12:03)  T(F): 97.9 (09 Aug 2022 04:50), Max: 98.6 (08 Aug 2022 12:03)  HR: 60 (09 Aug 2022 04:50) (60 - 77)  BP: 128/63 (09 Aug 2022 04:50) (116/68 - 146/54)  BP(mean): --  RR: 16 (09 Aug 2022 04:50) (16 - 19)  SpO2: 96% (09 Aug 2022 04:50) (94% - 97%)    Parameters below as of 09 Aug 2022 01:05  Patient On (Oxygen Delivery Method): room air        LABS:    CBC Full  -  ( 09 Aug 2022 08:06 )  WBC Count : 7.72 K/uL  RBC Count : 3.56 M/uL  Hemoglobin : 10.9 g/dL  Hematocrit : 34.7 %  Platelet Count - Automated : 206 K/uL  Mean Cell Volume : 97.5 fl  Mean Cell Hemoglobin : 30.6 pg  Mean Cell Hemoglobin Concentration : 31.4 gm/dL  Auto Neutrophil # : 4.81 K/uL  Auto Lymphocyte # : 1.77 K/uL  Auto Monocyte # : 0.58 K/uL  Auto Eosinophil # : 0.50 K/uL  Auto Basophil # : 0.03 K/uL  Auto Neutrophil % : 62.3 %  Auto Lymphocyte % : 22.9 %  Auto Monocyte % : 7.5 %  Auto Eosinophil % : 6.5 %  Auto Basophil % : 0.4 %      08-09    143  |  111<H>  |  14  ----------------------------<  91  3.7   |  25  |  0.55    Ca    8.7      09 Aug 2022 08:06    TPro  6.0  /  Alb  2.1<L>  /  TBili  0.4  /  DBili  x   /  AST  41<H>  /  ALT  38  /  AlkPhos  47  08-09    CAPILLARY BLOOD GLUCOSE        CARDIAC MARKERS ( 09 Aug 2022 08:06 )  x     / x     / 678 U/L / x     / x      CARDIAC MARKERS ( 08 Aug 2022 05:19 )  x     / x     / 1882 U/L / x     / x          LIVER FUNCTIONS - ( 09 Aug 2022 08:06 )  Alb: 2.1 g/dL / Pro: 6.0 g/dL / ALK PHOS: 47 U/L / ALT: 38 U/L DA / AST: 41 U/L / GGT: x           Creatinine Trend: 0.55<--, 0.83<--  I&O's Summary          Clean Catch Clean Catch (Midstream)  08-08 @ 05:19   <10,000 CFU/mL Normal Urogenital Jessica  --  --          MEDICATIONS:    MEDICATIONS  (STANDING):  acetaminophen     Tablet .. 1000 milliGRAM(s) Oral every 8 hours  buMETAnide 2 milliGRAM(s) Oral daily  enoxaparin Injectable 40 milliGRAM(s) SubCutaneous every 24 hours  levothyroxine 50 MICROGram(s) Oral daily  lidocaine   4% Patch 1 Patch Transdermal daily  pantoprazole    Tablet 40 milliGRAM(s) Oral before breakfast  sodium chloride 0.9%. 1000 milliLiter(s) (85 mL/Hr) IV Continuous <Continuous>      MEDICATIONS  (PRN):  melatonin 3 milliGRAM(s) Oral at bedtime PRN Insomnia  naproxen 375 milliGRAM(s) Oral every 8 hours PRN Moderate Pain (4 - 6)  ondansetron Injectable 4 milliGRAM(s) IV Push every 8 hours PRN Nausea and/or Vomiting      REVIEW OF SYSTEMS:                           ALL ROS DONE [ X   ]    CONSTITUTIONAL:  LETHARGIC [   ], FEVER [   ], UNRESPONSIVE [   ]  CVS:  CP  [   ], SOB, [   ], PALPITATIONS [   ], DIZZYNESS [   ]  RS: COUGH [   ], SPUTUM [   ]  GI: ABDOMINAL PAIN [   ], NAUSEA [   ], VOMITINGS [   ], DIARRHEA [   ], CONSTIPATION [   ]  :  DYSURIA [   ], NOCTURIA [   ], INCREASED FREQUENCY [   ], DRIBLING [   ],  SKELETAL: PAINFUL JOINTS [   ], SWOLLEN JOINTS [   ], NECK ACHE [   ], LOW BACK ACHE [   ],  SKIN : ULCERS [   ], RASH [   ], ITCHING [   ]  CNS: HEAD ACHE [   ], DOUBLE VISION [   ], BLURRED VISION [   ], AMS / CONFUSION [   ], SEIZURES [   ], WEAKNESS [   ],TINGLING / NUMBNESS [   ]    PHYSICAL EXAMINATION:  GENERAL APPEARANCE: NO DISTRESS  HEENT:  NO PALLOR, NO  JVD,  NO   NODES, NECK SUPPLE  CVS: S1 +, S2 +,   RS: AEEB,  OCCASIONAL  RALES +,   NO RONCHI  ABD: SOFT, NT, NO, BS +  EXT: NO PE  SKIN: WARM,   SKELETAL:  ROM ACCEPTABLE  CNS:  AAO X    ,   DEFICITS    RADIOLOGY :      ASSESSMENT :     Pain of left hip    Obesity (ICD9 278.00)    systemic edema    Acne Cystica (ICD9 706.1)    CHF (Congestive Heart Failure) (ICD9 428.0)    DENIS (Obstructive Sleep Apnea) (ICD9 327.23)    Endometrial cancer    Sciatica    C Section 1965    cyst removed from the neck 1966    History of Dilatation and Curettage (ICD9 V45.89)    H/O: hysterectomy    S/P partial lobectomy of lung        PLAN:  HPI:  85 y/o female, coming form home, ambulates independently, with PMHx of Uterine Cancer (that has metastasized to the lungs), Sciatica, and neuropathy in the legs presents to the ED after she fell coming out of the bathroom and into her bedroom on Saturday (8/6) at 7:30 am. She said she slipped on the tile and hit her head on the plant stand, but couldn't stand up after. She reports crawling to the nearest phone to call her daughter, as she lives alone with no HHA, patient claims to wait for her daughter to help her get up. Patient states that she was on the floor for an unknown amount of time. Patient is AOx2-3 but an unreliable historian, collateral obtained from daughter, Batsheva. Per daughter, she states that patient living conditions are concerning, as she is not able to care for herself. Daughter is overwhelmed, worries that her mother is not following up with her own health and finances. Patient also states that she has difficulty ambulating, but does not think she can walk after fall. Patient denies feeling light-headed or dizzy prior to falling, and denies LOC post-fall. She said the pain became apparent later that night in the pelvic area and knees and rated the pain a 9/10.  (08 Aug 2022 11:23)      # S/P MECHANICAL FALL W/ TRAUMA TO POSTERIOR SCALP AND HIP  # AMBULATORY DYSFUNCTION  # HX OF SCIATICA  - NOTED CT HEAD AND HIP  - F/U ORTHOSTATIC VITALS  - F/U PT EVAL  - PAIN MANAGEMENT CONSULT    # R/O RHABDOMYOLYSIS  - F/U CPK    # LEUKOCYTOSIS - ? REACTIVE  - DENIES DYSURIA, COUGH, RASHES, N/V/C/D, ABDOMINAL PAIN    # ABDOMINAL WALL HERNIA    # GI AND DVT PPX.        Patient is a 86y old  Female who presents with a chief complaint of CC: Fall (08 Aug 2022 11:58)    PATIENT IS SEEN AND EXAMINED IN MEDICAL FLOOR.  NGT [    ]    BROOKE [   ]      GT [   ]    ALLERGIES:  codeine (Vomiting; Short breath; Nausea)  No Known Allergies      Daily     Daily     VITALS:    Vital Signs Last 24 Hrs  T(C): 36.6 (09 Aug 2022 04:50), Max: 37 (08 Aug 2022 12:03)  T(F): 97.9 (09 Aug 2022 04:50), Max: 98.6 (08 Aug 2022 12:03)  HR: 60 (09 Aug 2022 04:50) (60 - 77)  BP: 128/63 (09 Aug 2022 04:50) (116/68 - 146/54)  BP(mean): --  RR: 16 (09 Aug 2022 04:50) (16 - 19)  SpO2: 96% (09 Aug 2022 04:50) (94% - 97%)    Parameters below as of 09 Aug 2022 01:05  Patient On (Oxygen Delivery Method): room air        LABS:    CBC Full  -  ( 09 Aug 2022 08:06 )  WBC Count : 7.72 K/uL  RBC Count : 3.56 M/uL  Hemoglobin : 10.9 g/dL  Hematocrit : 34.7 %  Platelet Count - Automated : 206 K/uL  Mean Cell Volume : 97.5 fl  Mean Cell Hemoglobin : 30.6 pg  Mean Cell Hemoglobin Concentration : 31.4 gm/dL  Auto Neutrophil # : 4.81 K/uL  Auto Lymphocyte # : 1.77 K/uL  Auto Monocyte # : 0.58 K/uL  Auto Eosinophil # : 0.50 K/uL  Auto Basophil # : 0.03 K/uL  Auto Neutrophil % : 62.3 %  Auto Lymphocyte % : 22.9 %  Auto Monocyte % : 7.5 %  Auto Eosinophil % : 6.5 %  Auto Basophil % : 0.4 %      08-09    143  |  111<H>  |  14  ----------------------------<  91  3.7   |  25  |  0.55    Ca    8.7      09 Aug 2022 08:06    TPro  6.0  /  Alb  2.1<L>  /  TBili  0.4  /  DBili  x   /  AST  41<H>  /  ALT  38  /  AlkPhos  47  08-09    CAPILLARY BLOOD GLUCOSE        CARDIAC MARKERS ( 09 Aug 2022 08:06 )  x     / x     / 678 U/L / x     / x      CARDIAC MARKERS ( 08 Aug 2022 05:19 )  x     / x     / 1882 U/L / x     / x          LIVER FUNCTIONS - ( 09 Aug 2022 08:06 )  Alb: 2.1 g/dL / Pro: 6.0 g/dL / ALK PHOS: 47 U/L / ALT: 38 U/L DA / AST: 41 U/L / GGT: x           Creatinine Trend: 0.55<--, 0.83<--  I&O's Summary          Clean Catch Clean Catch (Midstream)  08-08 @ 05:19   <10,000 CFU/mL Normal Urogenital Jessica  --  --          MEDICATIONS:    MEDICATIONS  (STANDING):  acetaminophen     Tablet .. 1000 milliGRAM(s) Oral every 8 hours  buMETAnide 2 milliGRAM(s) Oral daily  enoxaparin Injectable 40 milliGRAM(s) SubCutaneous every 24 hours  levothyroxine 50 MICROGram(s) Oral daily  lidocaine   4% Patch 1 Patch Transdermal daily  pantoprazole    Tablet 40 milliGRAM(s) Oral before breakfast  sodium chloride 0.9%. 1000 milliLiter(s) (85 mL/Hr) IV Continuous <Continuous>      MEDICATIONS  (PRN):  melatonin 3 milliGRAM(s) Oral at bedtime PRN Insomnia  naproxen 375 milliGRAM(s) Oral every 8 hours PRN Moderate Pain (4 - 6)  ondansetron Injectable 4 milliGRAM(s) IV Push every 8 hours PRN Nausea and/or Vomiting      REVIEW OF SYSTEMS:                           ALL ROS DONE [ X   ]    CONSTITUTIONAL:  LETHARGIC [   ], FEVER [   ], UNRESPONSIVE [   ]  CVS:  CP  [   ], SOB, [   ], PALPITATIONS [   ], DIZZYNESS [   ]  RS: COUGH [   ], SPUTUM [   ]  GI: ABDOMINAL PAIN [   ], NAUSEA [   ], VOMITINGS [   ], DIARRHEA [   ], CONSTIPATION [   ]  :  DYSURIA [   ], NOCTURIA [   ], INCREASED FREQUENCY [   ], DRIBLING [   ],  SKELETAL: PAINFUL JOINTS [   ], SWOLLEN JOINTS [   ], NECK ACHE [   ], LOW BACK ACHE [   ],  SKIN : ULCERS [   ], RASH [   ], ITCHING [   ]  CNS: HEAD ACHE [   ], DOUBLE VISION [   ], BLURRED VISION [   ], AMS / CONFUSION [   ], SEIZURES [   ], WEAKNESS [   ],TINGLING / NUMBNESS [   ]    PHYSICAL EXAMINATION:  GENERAL APPEARANCE: NO DISTRESS  HEENT:  NO PALLOR, NO  JVD,  NO   NODES, NECK SUPPLE  CVS: S1 +, S2 +,   RS: AEEB,  OCCASIONAL  RALES +,   NO RONCHI  ABD: SOFT, NT, NO, BS +  EXT: NO PE  SKIN: WARM,   SKELETAL:  ROM ACCEPTABLE  CNS:  AAO X 3    RADIOLOGY :    ACC: [id number] EXAM:  CT BRAIN                          PROCEDURE DATE:  08/08/2022          INTERPRETATION:  NONCONTRAST CT OF THE BRAIN DATED 8/8/2022.    CLINICAL INFORMATION:  head trauma    TECHNIQUE: Axial CT images are obtained from the cranial vertex to the   skull base without the administration of IV contrast. Images are   reformatted in sagittal and coronal planes.    No prior studies are available for comparison.    FINDINGS:    Evaluation through the posterior fossa is degraded by streak artifact   from dental amalgam. There is no gross acute intra-axial or extra-axial   hemorrhage. There is no significant mass effect or shift of the midline.   The basal cisterns are not effaced. There is cerebral and cerebellar   volume loss with prominence of the ventricles, sulci, and cerebellar   folia. There are minimal chronic ischemic changes in the frontoparietal   white matter. There are atherosclerotic calcifications of the   intracranial carotid arteries and intradural vertebral arteries.    There is no significant scalp soft tissue swelling or scalp hematoma. The   skull base and bony calvarium are intact. The visualized paranasal   sinuses and tympanic/mastoid cavities are clear apart from mild bilateral   ethmoid mucosal thickening. There is evidence of bilateral lens surgery.    IMPRESSION:    Evaluation through the posterior fossa degraded by streak artifact from   dental amalgam. No gross acute intracranial hemorrhage, mass effect, or   acute osseous fracture.    =====================    ACC: [id number] EXAM:  CT CERVICAL SPINE                          PROCEDURE DATE:  08/08/2022          INTERPRETATION:  CLINICAL INFORMATION:  head trauma      TECHNIQUE: Thin section axial CT images are obtained from the skullbase   through the thoracic inlet and a study dedicated to evaluate the cervical   spine. Images are reformatted in the sagittal and coronal planes.    No prior studies are available for comparison.    FINDINGS:    There is nonspecific straightening of the cervical spine lordosis. There   is no acute cervical spine fracture or evidence of traumatic   malalignment. There is no significant prevertebral soft tissue   swelling/hematoma. There are multilevel degenerative changes with   multilevel intervertebral disc space narrowing, ventral osteophytes, disc   bulges and disc osteophyte complexes, and facet and uncinate hypertrophy   contributing to mild multilevel segmental canal stenosis and varying   degrees of neural foraminal stenosis. The regional soft tissues of the   neck are otherwise unremarkable. The lung apices are clear.      IMPRESSION:    No acute cervical spine fracture or evidence of traumatic malalignment.   Cervical spondylosis.    ============================    [facility]: [id number] EXAM:  CT HIP ONLY LT                          PROCEDURE DATE:  08/08/2022          INTERPRETATION:  CT HIP LEFT dated 8/8/2022 9:03 AM    INDICATION: Left hip pain after trauma    COMPARISON: Pelvic radiograph dated 8/7/2022    TECHNIQUE: CT imaging of the pelvis was performed. The data was   reformatted in the axial, coronal, and sagittal planes. Additionally, 3-D   reformatted imaging was created.    FINDINGS: Mildly degraded by motion artifact.    OSSEOUS STRUCTURES: There is no fracture. There is mild bilateral hip   joint space narrowing. Productive changes of the lower lumbar spine.   Marked narrowing the pubic symphysis with sclerosis and small cystic   change. Subcentimeter bone island the left femoral head.  SYNOVIUM/ JOINT FLUID: No hip joint effusion.  TENDONS: The tendons are intact.  MUSCLES: No muscle hematoma is noted.  NEUROVASCULAR STRUCTURES: Moderate vascular calcifications present.  INTRAPELVIC SOFT TISSUES: Large fat and nonobstructed bowel containing  left abdominal wall hernia.  SUBCUTANEOUS SOFT TISSUES: No soft tissue swelling.    3-D reformatted imaging confirms these findings.    IMPRESSION:    No fracture identified.  Degenerative changes.      ASSESSMENT :     Pain of left hip    Obesity (ICD9 278.00)    systemic edema    Acne Cystica (ICD9 706.1)    CHF (Congestive Heart Failure) (ICD9 428.0)    DENIS (Obstructive Sleep Apnea) (ICD9 327.23)    Endometrial cancer    Sciatica    C Section 1965    cyst removed from the neck 1966    History of Dilatation and Curettage (ICD9 V45.89)    H/O: hysterectomy    S/P partial lobectomy of lung        PLAN:  HPI:  87 y/o female, coming form home, ambulates independently, with PMHx of Uterine Cancer (that has metastasized to the lungs), Sciatica, and neuropathy in the legs presents to the ED after she fell coming out of the bathroom and into her bedroom on Saturday (8/6) at 7:30 am. She said she slipped on the tile and hit her head on the plant stand, but couldn't stand up after. She reports crawling to the nearest phone to call her daughter, as she lives alone with no HHA, patient claims to wait for her daughter to help her get up. Patient states that she was on the floor for an unknown amount of time. Patient is AOx2-3 but an unreliable historian, collateral obtained from daughter, Batsheva. Per daughter, she states that patient living conditions are concerning, as she is not able to care for herself. Daughter is overwhelmed, worries that her mother is not following up with her own health and finances. Patient also states that she has difficulty ambulating, but does not think she can walk after fall. Patient denies feeling light-headed or dizzy prior to falling, and denies LOC post-fall. She said the pain became apparent later that night in the pelvic area and knees and rated the pain a 9/10.  (08 Aug 2022 11:23)    # PT RECOMMENDATION FOR [facility] - CM TEAM TO COORDINATE W/ FAMILY    # S/P MECHANICAL FALL W/ TRAUMA TO POSTERIOR SCALP AND HIP  # AMBULATORY DYSFUNCTION  # HX OF SCIATICA  - NOTED CT HEAD AND HIP  - F/U ORTHOSTATIC VITALS  - NOTED PT EVAL  - PAIN MANAGEMENT CONSULT    # RHABDOMYOLYSIS - IMPROVED  - S/P IVF    # LEUKOCYTOSIS - SUSPECT REACTIVE - RESOLVING  - DENIES DYSURIA, COUGH, RASHES, N/V/C/D, ABDOMINAL PAIN    # ABDOMINAL WALL HERNIA - PER PATIENT CHRONIC, AND HAS PREVIOUSLY BEEN EVALUATED BY SURGICAL     # GI AND DVT PPX.

## 2022-08-09 NOTE — PHYSICAL THERAPY INITIAL EVALUATION ADULT - AMBULATION SKILLS, REHAB EVAL
Additional Notes: Smoking cessation discussed.   Patient is trying to get on chantix SAC outdoors as needed/independent/needs device

## 2022-08-09 NOTE — PROGRESS NOTE ADULT - PROBLEM SELECTOR PLAN 1
s/p fall, no LOC  Unknown exact down time (estimate of at least 24 hrs based on daughter's hx)   U/A: Blood in urine, hyaline casts 0-2  On admission CK=1,882.  Repeat PJ=144  C/w IV fluids   CK level in the AM-f/u results

## 2022-08-10 ENCOUNTER — TRANSCRIPTION ENCOUNTER (OUTPATIENT)
Age: 87
End: 2022-08-10

## 2022-08-10 DIAGNOSIS — E87.6 HYPOKALEMIA: ICD-10-CM

## 2022-08-10 LAB
ANION GAP SERPL CALC-SCNC: 6 MMOL/L — SIGNIFICANT CHANGE UP (ref 5–17)
BUN SERPL-MCNC: 14 MG/DL — SIGNIFICANT CHANGE UP (ref 7–18)
CALCIUM SERPL-MCNC: 8.8 MG/DL — SIGNIFICANT CHANGE UP (ref 8.4–10.5)
CHLORIDE SERPL-SCNC: 111 MMOL/L — HIGH (ref 96–108)
CK SERPL-CCNC: 621 U/L — HIGH (ref 21–215)
CO2 SERPL-SCNC: 27 MMOL/L — SIGNIFICANT CHANGE UP (ref 22–31)
CREAT SERPL-MCNC: 0.6 MG/DL — SIGNIFICANT CHANGE UP (ref 0.5–1.3)
EGFR: 88 ML/MIN/1.73M2 — SIGNIFICANT CHANGE UP
GLUCOSE SERPL-MCNC: 98 MG/DL — SIGNIFICANT CHANGE UP (ref 70–99)
HCT VFR BLD CALC: 34.9 % — SIGNIFICANT CHANGE UP (ref 34.5–45)
HGB BLD-MCNC: 11.1 G/DL — LOW (ref 11.5–15.5)
MCHC RBC-ENTMCNC: 31 PG — SIGNIFICANT CHANGE UP (ref 27–34)
MCHC RBC-ENTMCNC: 31.8 GM/DL — LOW (ref 32–36)
MCV RBC AUTO: 97.5 FL — SIGNIFICANT CHANGE UP (ref 80–100)
NRBC # BLD: 0 /100 WBCS — SIGNIFICANT CHANGE UP (ref 0–0)
PLATELET # BLD AUTO: 215 K/UL — SIGNIFICANT CHANGE UP (ref 150–400)
POTASSIUM SERPL-MCNC: 3.4 MMOL/L — LOW (ref 3.5–5.3)
POTASSIUM SERPL-SCNC: 3.4 MMOL/L — LOW (ref 3.5–5.3)
RBC # BLD: 3.58 M/UL — LOW (ref 3.8–5.2)
RBC # FLD: 13.1 % — SIGNIFICANT CHANGE UP (ref 10.3–14.5)
SODIUM SERPL-SCNC: 144 MMOL/L — SIGNIFICANT CHANGE UP (ref 135–145)
WBC # BLD: 6.2 K/UL — SIGNIFICANT CHANGE UP (ref 3.8–10.5)
WBC # FLD AUTO: 6.2 K/UL — SIGNIFICANT CHANGE UP (ref 3.8–10.5)

## 2022-08-10 PROCEDURE — 99232 SBSQ HOSP IP/OBS MODERATE 35: CPT

## 2022-08-10 RX ORDER — SODIUM CHLORIDE 9 MG/ML
1000 INJECTION INTRAMUSCULAR; INTRAVENOUS; SUBCUTANEOUS
Refills: 0 | Status: DISCONTINUED | OUTPATIENT
Start: 2022-08-10 | End: 2022-08-12

## 2022-08-10 RX ORDER — POTASSIUM CHLORIDE 20 MEQ
40 PACKET (EA) ORAL ONCE
Refills: 0 | Status: COMPLETED | OUTPATIENT
Start: 2022-08-10 | End: 2022-08-10

## 2022-08-10 RX ADMIN — Medication 1000 MILLIGRAM(S): at 21:50

## 2022-08-10 RX ADMIN — Medication 3 MILLIGRAM(S): at 21:50

## 2022-08-10 RX ADMIN — LIDOCAINE 1 PATCH: 4 CREAM TOPICAL at 08:20

## 2022-08-10 RX ADMIN — Medication 400 UNIT(S): at 11:25

## 2022-08-10 RX ADMIN — BUMETANIDE 2 MILLIGRAM(S): 0.25 INJECTION INTRAMUSCULAR; INTRAVENOUS at 06:25

## 2022-08-10 RX ADMIN — PANTOPRAZOLE SODIUM 40 MILLIGRAM(S): 20 TABLET, DELAYED RELEASE ORAL at 06:25

## 2022-08-10 RX ADMIN — Medication 50 MICROGRAM(S): at 06:26

## 2022-08-10 RX ADMIN — Medication 500 MILLIGRAM(S): at 11:24

## 2022-08-10 RX ADMIN — LIDOCAINE 1 PATCH: 4 CREAM TOPICAL at 14:07

## 2022-08-10 RX ADMIN — Medication 40 MILLIEQUIVALENT(S): at 10:48

## 2022-08-10 RX ADMIN — BENZOCAINE AND MENTHOL 1 LOZENGE: 5; 1 LIQUID ORAL at 09:51

## 2022-08-10 RX ADMIN — Medication 1000 MILLIGRAM(S): at 14:06

## 2022-08-10 RX ADMIN — LIDOCAINE 1 PATCH: 4 CREAM TOPICAL at 11:28

## 2022-08-10 RX ADMIN — Medication 1000 MILLIGRAM(S): at 22:30

## 2022-08-10 RX ADMIN — Medication 1000 MILLIGRAM(S): at 15:00

## 2022-08-10 RX ADMIN — Medication 1000 MILLIGRAM(S): at 07:06

## 2022-08-10 RX ADMIN — Medication 1000 MILLIGRAM(S): at 00:10

## 2022-08-10 RX ADMIN — SENNA PLUS 1 TABLET(S): 8.6 TABLET ORAL at 21:51

## 2022-08-10 RX ADMIN — ESCITALOPRAM OXALATE 15 MILLIGRAM(S): 10 TABLET, FILM COATED ORAL at 11:24

## 2022-08-10 RX ADMIN — SODIUM CHLORIDE 85 MILLILITER(S): 9 INJECTION INTRAMUSCULAR; INTRAVENOUS; SUBCUTANEOUS at 06:28

## 2022-08-10 RX ADMIN — Medication 1 TABLET(S): at 11:24

## 2022-08-10 RX ADMIN — SODIUM CHLORIDE 85 MILLILITER(S): 9 INJECTION INTRAMUSCULAR; INTRAVENOUS; SUBCUTANEOUS at 22:24

## 2022-08-10 RX ADMIN — ENOXAPARIN SODIUM 40 MILLIGRAM(S): 100 INJECTION SUBCUTANEOUS at 06:26

## 2022-08-10 RX ADMIN — Medication 1000 MILLIGRAM(S): at 06:25

## 2022-08-10 RX ADMIN — LIDOCAINE 1 PATCH: 4 CREAM TOPICAL at 21:41

## 2022-08-10 NOTE — DISCHARGE NOTE PROVIDER - NSDCMRMEDTOKEN_GEN_ALL_CORE_FT
ACETAMINOPHEN 325 MG TABLET: TAKE 1 TABLET BY MOUTH EVERY 4 HOURS AS NEEDED FOR PAIN  ANASTROZOLE  1 MG TABS:   benzocaine-menthol 15 mg-3.6 mg mucous membrane lozenge:  mucous membrane   bisacodyl 10 mg rectal suppository: 1 suppository(ies) rectal once a day, As needed, Constipation  BUMETANIDE  2 MG TABS:   escitalopram 5 mg oral tablet: 1 tab(s) orally 3 times a day  oxyCODONE 10 mg oral tablet: 1 tab(s) orally every 3 hours, As needed, Severe Pain (7 - 10)  SYNTHROID  50 MCG TABS:   TRIAMCINOLONE 0.025% CREAM: APPLY TOPICALLY TO AFFECTED AREA EVERY DAY   anastrozole 1 mg oral tablet: 1 tab(s) orally once a day  ascorbic acid 500 mg oral tablet: 1 tab(s) orally once a day  bumetanide 2 mg oral tablet: 1 tab(s) orally once a day  cholecalciferol oral tablet: 400 unit(s) orally once a day  escitalopram 5 mg oral tablet: 3 tab(s) orally once a day  levothyroxine 50 mcg (0.05 mg) oral tablet: 1 tab(s) orally once a day  lidocaine 4% topical film: Apply topically to affected area once a day  Multiple Vitamins oral tablet: 1 tab(s) orally once a day  polyethylene glycol 3350 oral powder for reconstitution: 17 gram(s) orally once a day  senna leaf extract oral tablet: 1 tab(s) orally once a day (at bedtime)  TRIAMCINOLONE 0.025% CREAM: APPLY TOPICALLY TO AFFECTED AREA EVERY DAY

## 2022-08-10 NOTE — PROGRESS NOTE ADULT - SUBJECTIVE AND OBJECTIVE BOX
NP Note discussed with  primary attending    Patient is a 86y old  Female who presents with a chief complaint of Fall (10 Aug 2022 10:50)      INTERVAL HPI/OVERNIGHT EVENTS: Pt states, "I feel much better."  Denies right leg groin pain at rest but 8-9 right leg groin pain w/ movement.  States, "the patch relieves" her pain.   Pt reports to "need help with financial assistance and resources."       MEDICATIONS  (STANDING):  acetaminophen     Tablet .. 1000 milliGRAM(s) Oral every 8 hours  ascorbic acid 500 milliGRAM(s) Oral daily  buMETAnide 2 milliGRAM(s) Oral daily  cholecalciferol 400 Unit(s) Oral daily  enoxaparin Injectable 40 milliGRAM(s) SubCutaneous every 24 hours  escitalopram 15 milliGRAM(s) Oral daily  levothyroxine 50 MICROGram(s) Oral daily  lidocaine   4% Patch 1 Patch Transdermal daily  multivitamin 1 Tablet(s) Oral daily  pantoprazole    Tablet 40 milliGRAM(s) Oral before breakfast  polyethylene glycol 3350 17 Gram(s) Oral daily  senna 1 Tablet(s) Oral at bedtime  sodium chloride 0.9%. 1000 milliLiter(s) (85 mL/Hr) IV Continuous <Continuous>    MEDICATIONS  (PRN):  benzocaine 15 mG/menthol 3.6 mG Lozenge 1 Lozenge Oral three times a day PRN Mouth Sores  melatonin 3 milliGRAM(s) Oral at bedtime PRN Insomnia  naproxen 375 milliGRAM(s) Oral every 8 hours PRN Moderate Pain (4 - 6)  ondansetron Injectable 4 milliGRAM(s) IV Push every 8 hours PRN Nausea and/or Vomiting      __________________________________________________  REVIEW OF SYSTEMS:    CONSTITUTIONAL: No fever  EYES: No acute visual disturbances  NECK: No pain or stiffness  RESPIRATORY: No cough; No shortness of breath  CARDIOVASCULAR: No chest pain, no palpitations  GASTROINTESTINAL: No pain. No nausea or vomiting.  No diarrhea   NEUROLOGICAL: No headache or numbness, no tremors  MUSCULOSKELETAL: No joint pain, no muscle pain  GENITOURINARY: No dysuria, no frequency, no hesitancy  PSYCHIATRY: No depression , no anxiety  ALL OTHER  ROS negative        Vital Signs Last 24 Hrs  T(C): 36.7 (10 Aug 2022 13:44), Max: 36.7 (10 Aug 2022 13:44)  T(F): 98.1 (10 Aug 2022 13:44), Max: 98.1 (10 Aug 2022 13:44)  HR: 74 (10 Aug 2022 13:44) (62 - 76)  BP: 119/48 (10 Aug 2022 13:44) (119/48 - 153/76)  RR: 17 (10 Aug 2022 13:44) (17 - 18)  SpO2: 99% (10 Aug 2022 13:44) (95% - 99%)    Parameters below as of 10 Aug 2022 13:44  Patient On (Oxygen Delivery Method): room air        ________________________________________________  PHYSICAL EXAM:  GENERAL: NAD, obese  HEENT: Normocephalic;  conjunctivae and sclerae clear; moist mucous membranes;   NECK : Supple  CHEST/LUNG: Clear to auscultation bilaterally with good air entry   HEART: S1 S2  regular; no murmurs, gallops or rubs  ABDOMEN: Soft, Nontender, Nondistended; Bowel sounds present x 4 quad  EXTREMITIES: No cyanosis; no edema; no calf tenderness.  Unable to lift leg leg to 90 degrees.  Able to raise right leg to 90 degrees.    SKIN: Warm and dry; no rash  NERVOUS SYSTEM:  Awake and alert; Oriented  to place, person and time ; no new deficits    _________________________________________________  LABS:                        11.1   6.20  )-----------( 215      ( 10 Aug 2022 05:30 )             34.9     08-10    144  |  111<H>  |  14  ----------------------------<  98  3.4<L>   |  27  |  0.60    Ca    8.8      10 Aug 2022 05:30    TPro  6.0  /  Alb  2.1<L>  /  TBili  0.4  /  DBili  x   /  AST  41<H>  /  ALT  38  /  AlkPhos  47  08-09        CAPILLARY BLOOD GLUCOSE            RADIOLOGY & ADDITIONAL TESTS:    Imaging Personally Reviewed:  YES    Consultant(s) Notes Reviewed:   YES    Care Discussed with Consultants :     Plan of care was discussed with patient and /or primary care giver; all questions and concerns were addressed and care was aligned with patient's wishes.

## 2022-08-10 NOTE — DISCHARGE NOTE PROVIDER - CARE PROVIDER_API CALL
GABBY MARQUEZ  Internal Medicine  [address] [address]  Phone: [phone]  Fax: [phone]  Established Patient  Follow Up Time: 2 weeks

## 2022-08-10 NOTE — PROGRESS NOTE ADULT - PROBLEM SELECTOR PLAN 1
s/p fall, no LOC  Unknown exact down time (estimate of at least 24 hrs based on daughter's hx)   U/A: Blood in urine, hyaline casts 0-2  On admission CK=1,882.  Repeat PA=471  C/w IV fluids   CK level in the AM-f/u results

## 2022-08-10 NOTE — DISCHARGE NOTE PROVIDER - HOSPITAL COURSE
86 year old female, from home lives alone, ambulates independently, with PMHx of Uterine Cancer (that has metastasized to the lungs), Sciatica, and neuropathy in the legs.  Presented to the ED after she fell coming out of the bathroom and into her bedroom on Saturday (8/6) at 7:30 am. She said she slipped on the tile and hit her head on the plant stand, but couldn't stand up after. She reported crawling to the nearest phone to call her daughter.  Patient stated she was on the floor for an unknown amount of time.   Admitted for Rhabdomyolysis.      THIS IS A BRIEF SUMMARY.  FOR A FULL HOSPITAL COURSE SEE CHART      incomplete a/ 8/10      CM obtained rehab choices 86 year old female, from home lives alone, ambulates independently, with PMHx of Uterine Cancer (that has metastasized to the lungs), Sciatica, and neuropathy in the legs.  Presented to the ED after she fell coming out of the bathroom and into her bedroom on Saturday (8/6) at 7:30 am. She said she slipped on the tile and hit her head on the plant stand, but couldn't stand up after. She reported crawling to the nearest phone to call her daughter.  Patient stated she was on the floor for an unknown amount of time.   Admitted for Rhabdomyolysis.  treated with IVF. seen by PT rec REHAB. CK downtrending 227.  discussed with attending, pt medically stable for d/c to [facility]. Daughter beto notified of the transfer and agreed.    THIS IS A BRIEF SUMMARY.  FOR A FULL HOSPITAL COURSE SEE CHART

## 2022-08-10 NOTE — PROGRESS NOTE ADULT - PROBLEM SELECTOR PLAN 1
Pt with acute left hip pain which is somatic in nature due to fall. CT hip negative fx. + degenerative changes. + hx chronic low back pain with b/l sciatica and neuropathy. + hx anxiety, restarted home lexapro 15mg PO daily 8/9, verified by pharmacy.   High risk medications reviewed. Avoid polypharmacy. Avoid IV opioids. Avoid benzodiazepines. Non-pharmacological sleep aides initiated. Non-opioid medications and non-pharmacological pain management measures initiated.   Pt with allergy to codeine- SOB.   Maximize non-opioid pain recommendations   - Continue Acetaminophen 1 gram PO q 8 hours x 3 days  - Continue Naproxen 375mg PO q8h PRN moderate pain   - Pt refused gabapentin   - Continue Lidoderm 4% patch daily.   Bowel Regimen  - Per primary team  Mild pain   - Non-pharmacological pain treatment recommendations  - Warm/ Cool packs PRN   - Repositioning extremity, imagery, relaxation, distraction.  - Physical therapy OOB if no contraindications   Recommendations discussed with primary team and RN. Pt with acute left hip pain which is somatic in nature due to fall. + rhabdo CK decreased from 1882 (8/8) -> 678 (8/9)-> 621 (8/8). On IVF. CT hip negative fx. + degenerative changes. + hx chronic low back pain with b/l sciatica and neuropathy. + hx anxiety, restarted home lexapro 15mg PO daily 8/9, verified by pharmacy.   High risk medications reviewed. Avoid polypharmacy. Avoid IV opioids. Avoid benzodiazepines. Non-pharmacological sleep aides initiated. Non-opioid medications and non-pharmacological pain management measures initiated.   Pt with allergy to codeine- SOB.   Maximize non-opioid pain recommendations   - Continue Acetaminophen 1 gram PO q 8 hours x 3 days  - Continue Naproxen 375mg PO q8h PRN moderate pain   - Pt refused gabapentin   - Continue Lidoderm 4% patch daily.   Bowel Regimen  - Per primary team  Mild pain   - Non-pharmacological pain treatment recommendations  - Warm/ Cool packs PRN   - Repositioning extremity, imagery, relaxation, distraction.  - Physical therapy OOB if no contraindications   Recommendations discussed with primary team and RN.

## 2022-08-10 NOTE — PROGRESS NOTE ADULT - SUBJECTIVE AND OBJECTIVE BOX
Source of information: CHAYO HERNANDEZMARIA ALEJANDRA, Chart review  Patient language: English  : n/a    HPI:  An 85 y/o female presents to the ED after she fell coming out of the bathroom and into her bedroom on Saturday (8/6) at 7:30 am. She said she slipped on the tile and hit her head on the plant stand, but couldn't stand up after. She denies feeling light-headed or dizzy prior to falling, and denies LOC post-fall. She said the pain became apparent later that night in the pelvic area and knees and rated the pain a 9/10. She says she is unable to stand or walk now.  (08 Aug 2022 11:23)      Pt is admitted for fall. CT hip negative fx. + degenerative changes. Pain consulted for left hip pain. Reports left hip pain started after her fall on Saturday, states she slipped at home on her floor which had a small amount of water and hit her head and left side on the floor.   Pt seen and examined at bedside. Pt sitting in chair, reports her pain has improved with current pain regimen. Reports left hip pain 8/10, aching, radiating to left groin, alleviated by rest and pain medications, exacerbated by movement. Encouraged PRN naproxen use. Pt also reports hx chronic low back pain with b/l sciatica for which she take PRN tylenol and motrin at home. + chronic b/l LE numbness. Pt tolerating PO diet. Denies lethargy, chest pain, SOB, nausea, vomiting, constipation. Reports last BM 8/9. Patient stated goal for pain control: to be able to take deep breaths, get out of bed to chair and ambulate with tolerable pain control. Pt out of bed to chair. Pt has not yet been out of bed. Seen by PT 8/9, recommend SARINA.     PAST MEDICAL & SURGICAL HISTORY:  Obesity (ICD9 278.00)      systemic edema      Acne Cystica (ICD9 706.1)      CHF (Congestive Heart Failure) (ICD9 428.0)      DENIS (Obstructive Sleep Apnea) (ICD9 327.23)      Endometrial cancer      Sciatica      C Section 1965      cyst removed from the neck 1966      History of Dilatation and Curettage (ICD9 V45.89)      H/O: hysterectomy      S/P partial lobectomy of lung          FAMILY HISTORY:      Social History:  Lives alone; No aid; Uses a cane to walk outside the house (08 Aug 2022 11:23)   [X ] Denies ETOH use, illicit drug use and smoking    Allergies    No Known Allergies    Intolerances    codeine (Vomiting; Short breath; Nausea)    MEDICATIONS  (STANDING):  acetaminophen     Tablet .. 1000 milliGRAM(s) Oral every 8 hours  ascorbic acid 500 milliGRAM(s) Oral daily  buMETAnide 2 milliGRAM(s) Oral daily  cholecalciferol 400 Unit(s) Oral daily  enoxaparin Injectable 40 milliGRAM(s) SubCutaneous every 24 hours  escitalopram 15 milliGRAM(s) Oral daily  levothyroxine 50 MICROGram(s) Oral daily  lidocaine   4% Patch 1 Patch Transdermal daily  multivitamin 1 Tablet(s) Oral daily  pantoprazole    Tablet 40 milliGRAM(s) Oral before breakfast  polyethylene glycol 3350 17 Gram(s) Oral daily  potassium chloride    Tablet ER 40 milliEquivalent(s) Oral once  senna 1 Tablet(s) Oral at bedtime  sodium chloride 0.9%. 1000 milliLiter(s) (85 mL/Hr) IV Continuous <Continuous>    MEDICATIONS  (PRN):  benzocaine 15 mG/menthol 3.6 mG Lozenge 1 Lozenge Oral three times a day PRN Mouth Sores  melatonin 3 milliGRAM(s) Oral at bedtime PRN Insomnia  naproxen 375 milliGRAM(s) Oral every 8 hours PRN Moderate Pain (4 - 6)  ondansetron Injectable 4 milliGRAM(s) IV Push every 8 hours PRN Nausea and/or Vomiting      Vital Signs Last 24 Hrs  T(C): 36.3 (10 Aug 2022 05:40), Max: 36.6 (09 Aug 2022 13:20)  T(F): 97.4 (10 Aug 2022 05:40), Max: 97.9 (09 Aug 2022 13:20)  HR: 62 (10 Aug 2022 05:40) (62 - 76)  BP: 126/68 (10 Aug 2022 05:40) (126/68 - 153/76)  BP(mean): --  RR: 17 (10 Aug 2022 05:40) (17 - 18)  SpO2: 95% (10 Aug 2022 05:40) (95% - 99%)    Parameters below as of 10 Aug 2022 05:40  Patient On (Oxygen Delivery Method): room air      COVID-19 PCR: [facility] (08 Aug 2022 05:19)    LABS: Reviewed                          11.1   6.20  )-----------( 215      ( 10 Aug 2022 05:30 )             34.9     08-10    144  |  111<H>  |  14  ----------------------------<  98  3.4<L>   |  27  |  0.60    Ca    8.8      10 Aug 2022 05:30    TPro  6.0  /  Alb  2.1<L>  /  TBili  0.4  /  DBili  x   /  AST  41<H>  /  ALT  38  /  AlkPhos  47  08-09      LIVER FUNCTIONS - ( 09 Aug 2022 08:06 )  Alb: 2.1 g/dL / Pro: 6.0 g/dL / ALK PHOS: 47 U/L / ALT: 38 U/L DA / AST: 41 U/L / GGT: x           COVID-19 PCR: [facility] (08 Aug 2022 05:19)    Radiology: Reviewed.   < from: CT Head No Cont (08.08.22 @ 02:11) >    ACC: [id number] EXAM:  CT BRAIN                          PROCEDURE DATE:  08/08/2022          INTERPRETATION:  NONCONTRAST CT OF THE BRAIN DATED 8/8/2022.    CLINICAL INFORMATION:  head trauma    TECHNIQUE: Axial CT images are obtained from the cranial vertex to the   skull base without the administration of IV contrast. Images are   reformatted in sagittal and coronal planes.    No prior studies are available for comparison.    FINDINGS:    Evaluation through the posterior fossa is degraded by streak artifact   from dental amalgam. There is no gross acute intra-axial or extra-axial   hemorrhage. There is no significant mass effect or shift of the midline.   The basal cisterns are not effaced. There is cerebral and cerebellar   volume loss with prominence of the ventricles, sulci, and cerebellar   folia. There are minimal chronic ischemic changes in the frontoparietal   white matter. There are atherosclerotic calcifications of the   intracranial carotid arteries and intradural vertebral arteries.    There is no significant scalp soft tissue swelling or scalp hematoma. The   skull base and bony calvarium are intact. The visualized paranasal   sinuses and tympanic/mastoid cavities are clear apart from mild bilateral   ethmoid mucosal thickening. There is evidence of bilateral lens surgery.    IMPRESSION:    Evaluation through the posterior fossa degraded by streak artifact from   dental amalgam. No gross acute intracranial hemorrhage, mass effect, or   acute osseous fracture.    ---End of Report ---            DOROTHY LÓPEZ DO; Attending Radiologist  This document has been electronically signed. Aug  8 2022  2:51AM    < end of copied text >    < from: CT Hip No Cont, Left (08.08.22 @ 09:03) >    ACC: [id number] EXAM:  CT HIP ONLY LT                          PROCEDURE DATE:  08/08/2022          INTERPRETATION:  CT HIP LEFT dated 8/8/2022 9:03 AM    INDICATION: Left hip pain after trauma    COMPARISON: Pelvic radiograph dated 8/7/2022    TECHNIQUE: CT imaging of the pelvis was performed. The data was   reformatted in the axial, coronal, and sagittal planes. Additionally, 3-D   reformatted imaging was created.    FINDINGS: Mildly degraded by motion artifact.    OSSEOUS STRUCTURES: There is no fracture. There is mild bilateral hip   joint space narrowing. Productive changes of the lower lumbar spine.   Marked narrowing the pubic symphysis with sclerosis and small cystic   change. Subcentimeter bone island the left femoral head.  SYNOVIUM/ JOINT FLUID: No hip joint effusion.  TENDONS: The tendons are intact.  MUSCLES: No muscle hematoma is noted.  NEUROVASCULAR STRUCTURES: Moderate vascular calcifications present.  INTRAPELVIC SOFT TISSUES: Large fat and nonobstructed bowel containing  left abdominal wall hernia.  SUBCUTANEOUS SOFT TISSUES: No soft tissue swelling.    3-D reformatted imaging confirms these findings.    IMPRESSION:    No fracture identified.  Degenerative changes.    --- End of Report ---            LILIA MERAZ MD; Attending Radiologist  This document has been electronically signed. Aug  8 2022  9:35AM    < end of copied text >      ORT Score -   Family Hx of substance abuse	Female	      Male  Alcohol 	                                           1                     3  Illegal drugs	                                   2                     3  Rx drugs                                           4 	                  4  Personal Hx of substance abuse		  Alcohol 	                                          3	                  3  Illegal drugs                                     4	                  4  Rx drugs                                            5 	                  5  Age between 16- 45 years	           1                     1  hx preadolescent sexual abuse	   3 	                  0  Psychological disease		  ADD, OCD, bipolar, schizophrenia   2	          2  Depression                                           1 	          1  Total: 0    a score of 3 or lower indicates low risk for opioid abuse		  a score of 4-7 indicates moderate risk for opioid abuse		  a score of 8 or higher indicates high risk for opioid abuse    REVIEW OF SYSTEMS:  CONSTITUTIONAL: No fever or fatigue  HEENT:  No difficulty hearing, no change in vision  NECK: No pain or stiffness  RESPIRATORY: No cough, wheezing, chills or hemoptysis; No shortness of breath  CARDIOVASCULAR: No chest pain, palpitations, dizziness, or leg swelling  GASTROINTESTINAL: No loss of appetite, decreased PO intake. No abdominal or epigastric pain. No nausea, vomiting; No diarrhea or constipation. + abdominal hernia   GENITOURINARY: No dysuria, frequency, hematuria, retention or incontinence  MUSCULOSKELETAL: + left hip and groin pain. + chronic back pain with b/l sciatica. No swelling; + decreased ROM b/l upper extremities and left lower extremities; no upper or lower motor strength weakness, no saddle anesthesia, bowel/bladder incontinence, + falls   NEURO: No headaches, + chronic numbness/tingling b/l LE, No weakness  PSYCH: hx anxiety    PHYSICAL EXAM:  GENERAL:  Alert & Oriented X4, cooperative, NAD, Good concentration. Speech is clear.   RESPIRATORY: Respirations even and unlabored. Clear to auscultation bilaterally; No rales, rhonchi, wheezing, or rubs  CARDIOVASCULAR: Normal S1/S2, regular rate and rhythm; No murmurs, rubs, or gallops. No JVD.   GASTROINTESTINAL: + obese per BMI, hernia, Soft, Nontender, Nondistended; Bowel sounds present  PERIPHERAL VASCULAR:  Extremities warm without edema. 2+ Peripheral Pulses, No cyanosis, No calf tenderness  MUSCULOSKELETAL: Motor Strength 5/5 B/L upper and 3/5 lower extremities; + decreased ROM left LE and b/l upper extremities; negative SLR; + left hip tenderness on palpation.    SKIN: Warm, dry, intact. No rashes, lesions, scars or wounds.     Risk factors associated with adverse outcomes related to opioid treatment  [ ]  Concurrent benzodiazepine use  [ ]  History/ Active substance use or alcohol use disorder  [X ] Psychiatric co-morbidity  [X ] Sleep apnea  [ ] COPD  [X ] BMI> 35  [ ] Liver dysfunction  [ ] Renal dysfunction  [X ] CHF  [ ] Smoker  [X ]  Age > 60 years    [X ]  [place]  Reviewed and Copied to Chart. See below.    Plan of care and goal oriented pain management treatment options were discussed with patient and /or primary care giver; all questions and concerns were addressed and care was aligned with patient's wishes.    Educated patient on goal oriented pain management treatment options     08-10-22 @ 10:29           Source of information: CHAYO HERNANDEZMARIA ALEJANDRA, Chart review  Patient language: English  : n/a    HPI:  An 87 y/o female presents to the ED after she fell coming out of the bathroom and into her bedroom on Saturday (8/6) at 7:30 am. She said she slipped on the tile and hit her head on the plant stand, but couldn't stand up after. She denies feeling light-headed or dizzy prior to falling, and denies LOC post-fall. She said the pain became apparent later that night in the pelvic area and knees and rated the pain a 9/10. She says she is unable to stand or walk now.  (08 Aug 2022 11:23)      Pt is admitted for fall. CT hip negative fx. + degenerative changes. Pain consulted for left hip pain. Reports left hip pain started after her fall on Saturday, states she slipped at home on her floor which had a small amount of water and hit her head and left side on the floor. + rhabdo CK decreased from 1882 (8/8) -> 678 (8/9)-> 621 (8/8). On IVF.    Pt seen and examined at bedside. Pt sitting in chair, reports her pain has improved with current pain regimen. Reports left hip pain 8/10, aching, radiating to left groin, alleviated by rest and pain medications, exacerbated by movement. Encouraged PRN naproxen use. Pt also reports hx chronic low back pain with b/l sciatica for which she take PRN tylenol and motrin at home. + chronic b/l LE numbness. Pt tolerating PO diet. Denies lethargy, chest pain, SOB, nausea, vomiting, constipation. Reports last BM 8/9. Patient stated goal for pain control: to be able to take deep breaths, get out of bed to chair and ambulate with tolerable pain control. Pt out of bed to chair. Pt has not yet been out of bed. Seen by PT 8/9, bambi BRANCH.     PAST MEDICAL & SURGICAL HISTORY:  Obesity (ICD9 278.00)      systemic edema      Acne Cystica (ICD9 706.1)      CHF (Congestive Heart Failure) (ICD9 428.0)      DENIS (Obstructive Sleep Apnea) (ICD9 327.23)      Endometrial cancer      Sciatica      C Section 1965      cyst removed from the neck 1966      History of Dilatation and Curettage (ICD9 V45.89)      H/O: hysterectomy      S/P partial lobectomy of lung          FAMILY HISTORY:      Social History:  Lives alone; No aid; Uses a cane to walk outside the house (08 Aug 2022 11:23)   [X ] Denies ETOH use, illicit drug use and smoking    Allergies    No Known Allergies    Intolerances    codeine (Vomiting; Short breath; Nausea)    MEDICATIONS  (STANDING):  acetaminophen     Tablet .. 1000 milliGRAM(s) Oral every 8 hours  ascorbic acid 500 milliGRAM(s) Oral daily  buMETAnide 2 milliGRAM(s) Oral daily  cholecalciferol 400 Unit(s) Oral daily  enoxaparin Injectable 40 milliGRAM(s) SubCutaneous every 24 hours  escitalopram 15 milliGRAM(s) Oral daily  levothyroxine 50 MICROGram(s) Oral daily  lidocaine   4% Patch 1 Patch Transdermal daily  multivitamin 1 Tablet(s) Oral daily  pantoprazole    Tablet 40 milliGRAM(s) Oral before breakfast  polyethylene glycol 3350 17 Gram(s) Oral daily  potassium chloride    Tablet ER 40 milliEquivalent(s) Oral once  senna 1 Tablet(s) Oral at bedtime  sodium chloride 0.9%. 1000 milliLiter(s) (85 mL/Hr) IV Continuous <Continuous>    MEDICATIONS  (PRN):  benzocaine 15 mG/menthol 3.6 mG Lozenge 1 Lozenge Oral three times a day PRN Mouth Sores  melatonin 3 milliGRAM(s) Oral at bedtime PRN Insomnia  naproxen 375 milliGRAM(s) Oral every 8 hours PRN Moderate Pain (4 - 6)  ondansetron Injectable 4 milliGRAM(s) IV Push every 8 hours PRN Nausea and/or Vomiting      Vital Signs Last 24 Hrs  T(C): 36.3 (10 Aug 2022 05:40), Max: 36.6 (09 Aug 2022 13:20)  T(F): 97.4 (10 Aug 2022 05:40), Max: 97.9 (09 Aug 2022 13:20)  HR: 62 (10 Aug 2022 05:40) (62 - 76)  BP: 126/68 (10 Aug 2022 05:40) (126/68 - 153/76)  BP(mean): --  RR: 17 (10 Aug 2022 05:40) (17 - 18)  SpO2: 95% (10 Aug 2022 05:40) (95% - 99%)    Parameters below as of 10 Aug 2022 05:40  Patient On (Oxygen Delivery Method): room air      [facility]    LABS: Reviewed                          11.1   6.20  )-----------( 215      ( 10 Aug 2022 05:30 )             34.9     08-10    144  |  111<H>  |  14  ----------------------------<  98  3.4<L>   |  27  |  0.60    Ca    8.8      10 Aug 2022 05:30    TPro  6.0  /  Alb  2.1<L>  /  TBili  0.4  /  DBili  x   /  AST  41<H>  /  ALT  38  /  AlkPhos  47  08-09      LIVER FUNCTIONS - ( 09 Aug 2022 08:06 )  Alb: 2.1 g/dL / Pro: 6.0 g/dL / ALK PHOS: 47 U/L / ALT: 38 U/L DA / AST: 41 U/L / GGT: x           COVID-19 PCR: [facility] (08 Aug 2022 05:19)    Radiology: Reviewed.   < from: CT Head No Cont (08.08.22 @ 02:11) >    ACC: [id number] EXAM:  CT BRAIN                          PROCEDURE DATE:  08/08/2022          INTERPRETATION:  NONCONTRAST CT OF THE BRAIN DATED 8/8/2022.    CLINICAL INFORMATION:  head trauma    TECHNIQUE: Axial CT images are obtained from the cranial vertex to the   skull base without the administration of IV contrast. Images are   reformatted in sagittal and coronal planes.    No prior studies are available for comparison.    FINDINGS:    Evaluation through the posterior fossa is degraded by streak artifact   from dental amalgam. There is no gross acute intra-axial or extra-axial   hemorrhage. There is no significant mass effect or shift of the midline.   The basal cisterns are not effaced. There is cerebral and cerebellar   volume loss with prominence of the ventricles, sulci, and cerebellar   folia. There are minimal chronic ischemic changes in the frontoparietal   white matter. There are atherosclerotic calcifications of the   intracranial carotid arteries and intradural vertebral arteries.    There is no significant scalp soft tissue swelling or scalp hematoma. The   skull base and bony calvarium are intact. The visualized paranasal   sinuses and tympanic/mastoid cavities are clear apart from mild bilateral   ethmoid mucosal thickening. There is evidence of bilateral lens surgery.    IMPRESSION:    Evaluation through the posterior fossa degraded by streak artifact from   dental amalgam. No gross acute intracranial hemorrhage, mass effect, or   acute osseous fracture.    ---End of Report ---            DOROTHY LÓPEZ DO; Attending Radiologist  This document has been electronically signed. Aug  8 2022  2:51AM    < end of copied text >    < from: CT Hip No Cont, Left (08.08.22 @ 09:03) >    ACC: [id number] EXAM:  CT HIP ONLY LT                          PROCEDURE DATE:  08/08/2022          INTERPRETATION:  CT HIP LEFT dated 8/8/2022 9:03 AM    INDICATION: Left hip pain after trauma    COMPARISON: Pelvic radiograph dated 8/7/2022    TECHNIQUE: CT imaging of the pelvis was performed. The data was   reformatted in the axial, coronal, and sagittal planes. Additionally, 3-D   reformatted imaging was created.    FINDINGS: Mildly degraded by motion artifact.    OSSEOUS STRUCTURES: There is no fracture. There is mild bilateral hip   joint space narrowing. Productive changes of the lower lumbar spine.   Marked narrowing the pubic symphysis with sclerosis and small cystic   change. Subcentimeter bone island the left femoral head.  SYNOVIUM/ JOINT FLUID: No hip joint effusion.  TENDONS: The tendons are intact.  MUSCLES: No muscle hematoma is noted.  NEUROVASCULAR STRUCTURES: Moderate vascular calcifications present.  INTRAPELVIC SOFT TISSUES: Large fat and nonobstructed bowel containing  left abdominal wall hernia.  SUBCUTANEOUS SOFT TISSUES: No soft tissue swelling.    3-D reformatted imaging confirms these findings.    IMPRESSION:    No fracture identified.  Degenerative changes.    --- End of Report ---            LILIA MERAZ MD; Attending Radiologist  This document has been electronically signed. Aug  8 2022  9:35AM    < end of copied text >      ORT Score -   Family Hx of substance abuse	Female	      Male  Alcohol 	                                           1                     3  Illegal drugs	                                   2                     3  Rx drugs                                           4 	                  4  Personal Hx of substance abuse		  Alcohol 	                                          3	                  3  Illegal drugs                                     4	                  4  Rx drugs                                            5 	                  5  Age between 16- 45 years	           1                     1  hx preadolescent sexual abuse	   3 	                  0  Psychological disease		  ADD, OCD, bipolar, schizophrenia   2	          2  Depression                                           1 	          1  Total: 0    a score of 3 or lower indicates low risk for opioid abuse		  a score of 4-7 indicates moderate risk for opioid abuse		  a score of 8 or higher indicates high risk for opioid abuse    REVIEW OF SYSTEMS:  CONSTITUTIONAL: No fever or fatigue  HEENT:  No difficulty hearing, no change in vision  NECK: No pain or stiffness  RESPIRATORY: No cough, wheezing, chills or hemoptysis; No shortness of breath  CARDIOVASCULAR: No chest pain, palpitations, dizziness, or leg swelling  GASTROINTESTINAL: No loss of appetite, decreased PO intake. No abdominal or epigastric pain. No nausea, vomiting; No diarrhea or constipation. + abdominal hernia   GENITOURINARY: No dysuria, frequency, hematuria, retention or incontinence  MUSCULOSKELETAL: + left hip and groin pain. + chronic back pain with b/l sciatica. No swelling; + decreased ROM b/l upper extremities and left lower extremities; no upper or lower motor strength weakness, no saddle anesthesia, bowel/bladder incontinence, + falls   NEURO: No headaches, + chronic numbness/tingling b/l LE, No weakness  PSYCH: hx anxiety    PHYSICAL EXAM:  GENERAL:  Alert & Oriented X4, cooperative, NAD, Good concentration. Speech is clear.   RESPIRATORY: Respirations even and unlabored. Clear to auscultation bilaterally; No rales, rhonchi, wheezing, or rubs  CARDIOVASCULAR: Normal S1/S2, regular rate and rhythm; No murmurs, rubs, or gallops. No JVD.   GASTROINTESTINAL: + obese per BMI, hernia, Soft, Nontender, Nondistended; Bowel sounds present  PERIPHERAL VASCULAR:  Extremities warm without edema. 2+ Peripheral Pulses, No cyanosis, No calf tenderness  MUSCULOSKELETAL: Motor Strength 5/5 B/L upper and 3/5 lower extremities; + decreased ROM left LE and b/l upper extremities; negative SLR; + left hip tenderness on palpation.    SKIN: Warm, dry, intact. No rashes, lesions, scars or wounds.     Risk factors associated with adverse outcomes related to opioid treatment  [ ]  Concurrent benzodiazepine use  [ ]  History/ Active substance use or alcohol use disorder  [X ] Psychiatric co-morbidity  [X ] Sleep apnea  [ ] COPD  [X ] BMI> 35  [ ] Liver dysfunction  [ ] Renal dysfunction  [X ] CHF  [ ] Smoker  [X ]  Age > 60 years    [X ]  [place]  Reviewed and Copied to Chart. See below.    Plan of care and goal oriented pain management treatment options were discussed with patient and /or primary care giver; all questions and concerns were addressed and care was aligned with patient's wishes.    Educated patient on goal oriented pain management treatment options     08-10-22 @ 10:29

## 2022-08-10 NOTE — DISCHARGE NOTE PROVIDER - NSDCCPCAREPLAN_GEN_ALL_CORE_FT
PRINCIPAL DISCHARGE DIAGNOSIS  Diagnosis: Rhabdomyolysis  Assessment and Plan of Treatment: You presented to the Ed after a fall at home. you were found to have RHabdomyolysis. Rhabdomyolysis is when muscle tissue gets severely damaged and substances from inside the muscle leak into the bloos stream. Causes of Rhabdomyoplysis include injuries such as FALL. You were treated with IVF fluids and your condition has improved. YOu were seen by PT who reccommended Rehab.      SECONDARY DISCHARGE DIAGNOSES  Diagnosis: Fall  Assessment and Plan of Treatment: You presented to the Ed with a fall from home. YOu were seen by Physical therapy who recommended REHAB.     PRINCIPAL DISCHARGE DIAGNOSIS  Diagnosis: Rhabdomyolysis  Assessment and Plan of Treatment: You presented to the Ed after a fall at home. you were found to have RHabdomyolysis. Rhabdomyolysis is when muscle tissue gets severely damaged and substances from inside the muscle leak into the bloos stream. Causes of Rhabdomyoplysis include injuries such as FALL. You were treated with IVF fluids and your condition has improved. YOu were seen by PT who reccommended Rehab.      SECONDARY DISCHARGE DIAGNOSES  Diagnosis: Fall  Assessment and Plan of Treatment: You presented to the Ed with a fall from home. YOu were seen by Physical therapy who recommended REHAB. It is important to ensure that the walk ways within your home remain free of clutter.  Area rugs, while decorative, are a tripping and fall hazard.  Also, please keep these areas well lit.  When standing if you feel dizzy or lightheaded, please sit back down and call for help before standing again.

## 2022-08-10 NOTE — PROGRESS NOTE ADULT - SUBJECTIVE AND OBJECTIVE BOX
`Patient is a 86y old  Female who presents with a chief complaint of Fall (10 Aug 2022 10:28)    PATIENT IS SEEN AND EXAMINED IN MEDICAL FLOOR.  NGT [    ]    COX [   ]      GT [   ]    ALLERGIES:  codeine (Vomiting; Short breath; Nausea)  No Known Allergies      Daily     Daily     VITALS:    Vital Signs Last 24 Hrs  T(C): 36.3 (10 Aug 2022 05:40), Max: 36.6 (09 Aug 2022 13:20)  T(F): 97.4 (10 Aug 2022 05:40), Max: 97.9 (09 Aug 2022 13:20)  HR: 62 (10 Aug 2022 05:40) (62 - 76)  BP: 126/68 (10 Aug 2022 05:40) (126/68 - 153/76)  BP(mean): --  RR: 17 (10 Aug 2022 05:40) (17 - 18)  SpO2: 95% (10 Aug 2022 05:40) (95% - 99%)    Parameters below as of 10 Aug 2022 05:40  Patient On (Oxygen Delivery Method): room air        LABS:    CBC Full  -  ( 10 Aug 2022 05:30 )  WBC Count : 6.20 K/uL  RBC Count : 3.58 M/uL  Hemoglobin : 11.1 g/dL  Hematocrit : 34.9 %  Platelet Count - Automated : 215 K/uL  Mean Cell Volume : 97.5 fl  Mean Cell Hemoglobin : 31.0 pg  Mean Cell Hemoglobin Concentration : 31.8 gm/dL  Auto Neutrophil # : x  Auto Lymphocyte # : x  Auto Monocyte # : x  Auto Eosinophil # : x  Auto Basophil # : x  Auto Neutrophil % : x  Auto Lymphocyte % : x  Auto Monocyte % : x  Auto Eosinophil % : x  Auto Basophil % : x      08-10    144  |  111<H>  |  14  ----------------------------<  98  3.4<L>   |  27  |  0.60    Ca    8.8      10 Aug 2022 05:30    TPro  6.0  /  Alb  2.1<L>  /  TBili  0.4  /  DBili  x   /  AST  41<H>  /  ALT  38  /  AlkPhos  47  08-09    CAPILLARY BLOOD GLUCOSE        CARDIAC MARKERS ( 10 Aug 2022 05:30 )  x     / x     / 621 U/L / x     / x      CARDIAC MARKERS ( 09 Aug 2022 08:06 )  x     / x     / 678 U/L / x     / x          LIVER FUNCTIONS - ( 09 Aug 2022 08:06 )  Alb: 2.1 g/dL / Pro: 6.0 g/dL / ALK PHOS: 47 U/L / ALT: 38 U/L DA / AST: 41 U/L / GGT: x           Creatinine Trend: 0.60<--, 0.55<--, 0.83<--  I&O's Summary    09 Aug 2022 07:01  -  10 Aug 2022 07:00  --------------------------------------------------------  IN: 950 mL / OUT: 3 mL / NET: 947 mL            Clean Catch Clean Catch (Midstream)  08-08 @ 05:19   <10,000 CFU/mL Normal Urogenital Jessica  --  --          MEDICATIONS:    MEDICATIONS  (STANDING):  acetaminophen     Tablet .. 1000 milliGRAM(s) Oral every 8 hours  ascorbic acid 500 milliGRAM(s) Oral daily  buMETAnide 2 milliGRAM(s) Oral daily  cholecalciferol 400 Unit(s) Oral daily  enoxaparin Injectable 40 milliGRAM(s) SubCutaneous every 24 hours  escitalopram 15 milliGRAM(s) Oral daily  levothyroxine 50 MICROGram(s) Oral daily  lidocaine   4% Patch 1 Patch Transdermal daily  multivitamin 1 Tablet(s) Oral daily  pantoprazole    Tablet 40 milliGRAM(s) Oral before breakfast  polyethylene glycol 3350 17 Gram(s) Oral daily  senna 1 Tablet(s) Oral at bedtime  sodium chloride 0.9%. 1000 milliLiter(s) (85 mL/Hr) IV Continuous <Continuous>      MEDICATIONS  (PRN):  benzocaine 15 mG/menthol 3.6 mG Lozenge 1 Lozenge Oral three times a day PRN Mouth Sores  melatonin 3 milliGRAM(s) Oral at bedtime PRN Insomnia  naproxen 375 milliGRAM(s) Oral every 8 hours PRN Moderate Pain (4 - 6)  ondansetron Injectable 4 milliGRAM(s) IV Push every 8 hours PRN Nausea and/or Vomiting      REVIEW OF SYSTEMS:                           ALL ROS DONE [ X   ]    CONSTITUTIONAL:  LETHARGIC [   ], FEVER [   ], UNRESPONSIVE [   ]  CVS:  CP  [   ], SOB, [   ], PALPITATIONS [   ], DIZZYNESS [   ]  RS: COUGH [   ], SPUTUM [   ]  GI: ABDOMINAL PAIN [   ], NAUSEA [   ], VOMITINGS [   ], DIARRHEA [   ], CONSTIPATION [   ]  :  DYSURIA [   ], NOCTURIA [   ], INCREASED FREQUENCY [   ], DRIBLING [   ],  SKELETAL: PAINFUL JOINTS [   ], SWOLLEN JOINTS [   ], NECK ACHE [   ], LOW BACK ACHE [   ],  SKIN : ULCERS [   ], RASH [   ], ITCHING [   ]  CNS: HEAD ACHE [   ], DOUBLE VISION [   ], BLURRED VISION [   ], AMS / CONFUSION [   ], SEIZURES [   ], WEAKNESS [   ],TINGLING / NUMBNESS [   ]    PHYSICAL EXAMINATION:  GENERAL APPEARANCE: NO DISTRESS  HEENT:  NO PALLOR, NO  JVD,  NO   NODES, NECK SUPPLE  CVS: S1 +, S2 +,   RS: AEEB,  OCCASIONAL  RALES +,   NO RONCHI  ABD: SOFT, NT, NO, BS +  EXT: NO PE  SKIN: WARM,   SKELETAL:  ROM ACCEPTABLE  CNS:  AAO X 3    RADIOLOGY :    ACC: [id number] EXAM:  CT BRAIN                          PROCEDURE DATE:  08/08/2022          INTERPRETATION:  NONCONTRAST CT OF THE BRAIN DATED 8/8/2022.    CLINICAL INFORMATION:  head trauma    TECHNIQUE: Axial CT images are obtained from the cranial vertex to the   skull base without the administration of IV contrast. Images are   reformatted in sagittal and coronal planes.    No prior studies are available for comparison.    FINDINGS:    Evaluation through the posterior fossa is degraded by streak artifact   from dental amalgam. There is no gross acute intra-axial or extra-axial   hemorrhage. There is no significant mass effect or shift of the midline.   The basal cisterns are not effaced. There is cerebral and cerebellar   volume loss with prominence of the ventricles, sulci, and cerebellar   folia. There are minimal chronic ischemic changes in the frontoparietal   white matter. There are atherosclerotic calcifications of the   intracranial carotid arteries and intradural vertebral arteries.    There is no significant scalp soft tissue swelling or scalp hematoma. The   skull base and bony calvarium are intact. The visualized paranasal   sinuses and tympanic/mastoid cavities are clear apart from mild bilateral   ethmoid mucosal thickening. There is evidence of bilateral lens surgery.    IMPRESSION:    Evaluation through the posterior fossa degraded by streak artifact from   dental amalgam. No gross acute intracranial hemorrhage, mass effect, or   acute osseous fracture.    =====================    ACC: [id number] EXAM:  CT CERVICAL SPINE                          PROCEDURE DATE:  08/08/2022          INTERPRETATION:  CLINICAL INFORMATION:  head trauma      TECHNIQUE: Thin section axial CT images are obtained from the skullbase   through the thoracic inlet and a study dedicated to evaluate the cervical   spine. Images are reformatted in the sagittal and coronal planes.    No prior studies are available for comparison.    FINDINGS:    There is nonspecific straightening of the cervical spine lordosis. There   is no acute cervical spine fracture or evidence of traumatic   malalignment. There is no significant prevertebral soft tissue   swelling/hematoma. There are multilevel degenerative changes with   multilevel intervertebral disc space narrowing, ventral osteophytes, disc   bulges and disc osteophyte complexes, and facet and uncinate hypertrophy   contributing to mild multilevel segmental canal stenosis and varying   degrees of neural foraminal stenosis. The regional soft tissues of the   neck are otherwise unremarkable. The lung apices are clear.      IMPRESSION:    No acute cervical spine fracture or evidence of traumatic malalignment.   Cervical spondylosis.    ============================    ACC: [id number] EXAM:  CT HIP ONLY LT                          PROCEDURE DATE:  08/08/2022          INTERPRETATION:  CT HIP LEFT dated 8/8/2022 9:03 AM    INDICATION: Left hip pain after trauma    COMPARISON: Pelvic radiograph dated 8/7/2022    TECHNIQUE: CT imaging of the pelvis was performed. The data was   reformatted in the axial, coronal, and sagittal planes. Additionally, 3-D   reformatted imaging was created.    FINDINGS: Mildly degraded by motion artifact.    OSSEOUS STRUCTURES: There is no fracture. There is mild bilateral hip   joint space narrowing. Productive changes of the lower lumbar spine.   Marked narrowing the pubic symphysis with sclerosis and small cystic   change. Subcentimeter bone island the left femoral head.  SYNOVIUM/ JOINT FLUID: No hip joint effusion.  TENDONS: The tendons are intact.  MUSCLES: No muscle hematoma is noted.  NEUROVASCULAR STRUCTURES: Moderate vascular calcifications present.  INTRAPELVIC SOFT TISSUES: Large fat and nonobstructed bowel containing  left abdominal wall hernia.  SUBCUTANEOUS SOFT TISSUES: No soft tissue swelling.    3-D reformatted imaging confirms these findings.    IMPRESSION:    No fracture identified.  Degenerative changes.      ASSESSMENT :     Pain of left hip    Obesity (ICD9 278.00)    systemic edema    Acne Cystica (ICD9 706.1)    CHF (Congestive Heart Failure) (ICD9 428.0)    DENIS (Obstructive Sleep Apnea) (ICD9 327.23)    Endometrial cancer    Sciatica    C Section 1965    cyst removed from the neck 1966    History of Dilatation and Curettage (ICD9 V45.89)    H/O: hysterectomy    S/P partial lobectomy of lung        PLAN:  HPI:  87 y/o female, coming form home, ambulates independently, with PMHx of Uterine Cancer (that has metastasized to the lungs), Sciatica, and neuropathy in the legs presents to the ED after she fell coming out of the bathroom and into her bedroom on Saturday (8/6) at 7:30 am. She said she slipped on the tile and hit her head on the plant stand, but couldn't stand up after. She reports crawling to the nearest phone to call her daughter, as she lives alone with no HHA, patient claims to wait for her daughter to help her get up. Patient states that she was on the floor for an unknown amount of time. Patient is AOx2-3 but an unreliable historian, collateral obtained from daughter, Batsheva. Per daughter, she states that patient living conditions are concerning, as she is not able to care for herself. Daughter is overwhelmed, worries that her mother is not following up with her own health and finances. Patient also states that she has difficulty ambulating, but does not think she can walk after fall. Patient denies feeling light-headed or dizzy prior to falling, and denies LOC post-fall. She said the pain became apparent later that night in the pelvic area and knees and rated the pain a 9/10.  (08 Aug 2022 11:23)    # PT RECOMMENDATION FOR [facility] - CM TEAM TO COORDINATE W/ FAMILY    # S/P MECHANICAL FALL W/ TRAUMA TO POSTERIOR SCALP AND HIP  # AMBULATORY DYSFUNCTION  # HX OF SCIATICA  - NOTED CT HEAD AND HIP  - F/U ORTHOSTATIC VITALS  - NOTED PT EVAL  - PAIN MANAGEMENT CONSULT    # RHABDOMYOLYSIS - IMPROVED  - S/P IVF    # LEUKOCYTOSIS - SUSPECT REACTIVE - RESOLVING  - DENIES DYSURIA, COUGH, RASHES, N/V/C/D, ABDOMINAL PAIN    # ABDOMINAL WALL HERNIA - PER PATIENT CHRONIC, AND HAS PREVIOUSLY BEEN EVALUATED BY SURGICAL     # GI AND DVT PPX.    Patient is a 86y old  Female who presents with a chief complaint of Fall (10 Aug 2022 10:28)    PATIENT IS SEEN AND EXAMINED IN MEDICAL FLOOR.    ALLERGIES:  codeine (Vomiting; Short breath; Nausea)  No Known Allergies    VITALS:    Vital Signs Last 24 Hrs  T(C): 36.3 (10 Aug 2022 05:40), Max: 36.6 (09 Aug 2022 13:20)  T(F): 97.4 (10 Aug 2022 05:40), Max: 97.9 (09 Aug 2022 13:20)  HR: 62 (10 Aug 2022 05:40) (62 - 76)  BP: 126/68 (10 Aug 2022 05:40) (126/68 - 153/76)  BP(mean): --  RR: 17 (10 Aug 2022 05:40) (17 - 18)  SpO2: 95% (10 Aug 2022 05:40) (95% - 99%)    Parameters below as of 10 Aug 2022 05:40  Patient On (Oxygen Delivery Method): room air        LABS:    CBC Full  -  ( 10 Aug 2022 05:30 )  WBC Count : 6.20 K/uL  RBC Count : 3.58 M/uL  Hemoglobin : 11.1 g/dL  Hematocrit : 34.9 %  Platelet Count - Automated : 215 K/uL  Mean Cell Volume : 97.5 fl  Mean Cell Hemoglobin : 31.0 pg  Mean Cell Hemoglobin Concentration : 31.8 gm/dL  Auto Neutrophil # : x  Auto Lymphocyte # : x  Auto Monocyte # : x  Auto Eosinophil # : x  Auto Basophil # : x  Auto Neutrophil % : x  Auto Lymphocyte % : x  Auto Monocyte % : x  Auto Eosinophil % : x  Auto Basophil % : x      08-10    144  |  111<H>  |  14  ----------------------------<  98  3.4<L>   |  27  |  0.60    Ca    8.8      10 Aug 2022 05:30    TPro  6.0  /  Alb  2.1<L>  /  TBili  0.4  /  DBili  x   /  AST  41<H>  /  ALT  38  /  AlkPhos  47  08-09    CAPILLARY BLOOD GLUCOSE        CARDIAC MARKERS ( 10 Aug 2022 05:30 )  x     / x     / 621 U/L / x     / x      CARDIAC MARKERS ( 09 Aug 2022 08:06 )  x     / x     / 678 U/L / x     / x          LIVER FUNCTIONS - ( 09 Aug 2022 08:06 )  Alb: 2.1 g/dL / Pro: 6.0 g/dL / ALK PHOS: 47 U/L / ALT: 38 U/L DA / AST: 41 U/L / GGT: x           Creatinine Trend: 0.60<--, 0.55<--, 0.83<--  I&O's Summary    09 Aug 2022 07:01  -  10 Aug 2022 07:00  --------------------------------------------------------  IN: 950 mL / OUT: 3 mL / NET: 947 mL            Clean Catch Clean Catch (Midstream)  08-08 @ 05:19   <10,000 CFU/mL Normal Urogenital Jessica  --  --          MEDICATIONS:    MEDICATIONS  (STANDING):  acetaminophen     Tablet .. 1000 milliGRAM(s) Oral every 8 hours  ascorbic acid 500 milliGRAM(s) Oral daily  buMETAnide 2 milliGRAM(s) Oral daily  cholecalciferol 400 Unit(s) Oral daily  enoxaparin Injectable 40 milliGRAM(s) SubCutaneous every 24 hours  escitalopram 15 milliGRAM(s) Oral daily  levothyroxine 50 MICROGram(s) Oral daily  lidocaine   4% Patch 1 Patch Transdermal daily  multivitamin 1 Tablet(s) Oral daily  pantoprazole    Tablet 40 milliGRAM(s) Oral before breakfast  polyethylene glycol 3350 17 Gram(s) Oral daily  senna 1 Tablet(s) Oral at bedtime  sodium chloride 0.9%. 1000 milliLiter(s) (85 mL/Hr) IV Continuous <Continuous>      MEDICATIONS  (PRN):  benzocaine 15 mG/menthol 3.6 mG Lozenge 1 Lozenge Oral three times a day PRN Mouth Sores  melatonin 3 milliGRAM(s) Oral at bedtime PRN Insomnia  naproxen 375 milliGRAM(s) Oral every 8 hours PRN Moderate Pain (4 - 6)  ondansetron Injectable 4 milliGRAM(s) IV Push every 8 hours PRN Nausea and/or Vomiting      REVIEW OF SYSTEMS:                           ALL ROS DONE [ X   ]    CONSTITUTIONAL:  LETHARGIC [   ], FEVER [   ], UNRESPONSIVE [   ]  CVS:  CP  [   ], SOB, [   ], PALPITATIONS [   ], DIZZYNESS [   ]  RS: COUGH [   ], SPUTUM [   ]  GI: ABDOMINAL PAIN [   ], NAUSEA [   ], VOMITINGS [   ], DIARRHEA [   ], CONSTIPATION [   ]  :  DYSURIA [   ], NOCTURIA [   ], INCREASED FREQUENCY [   ], DRIBLING [   ],  SKELETAL: PAINFUL JOINTS [   ], SWOLLEN JOINTS [   ], NECK ACHE [   ], LOW BACK ACHE [   ],  SKIN : ULCERS [   ], RASH [   ], ITCHING [   ]  CNS: HEAD ACHE [   ], DOUBLE VISION [   ], BLURRED VISION [   ], AMS / CONFUSION [   ], SEIZURES [   ], WEAKNESS [   ],TINGLING / NUMBNESS [   ]    PHYSICAL EXAMINATION:  GENERAL APPEARANCE: NO DISTRESS  HEENT:  NO PALLOR, NO  JVD,  NO   NODES, NECK SUPPLE  CVS: S1 +, S2 +,   RS: AEEB,  OCCASIONAL  RALES +,   NO RONCHI  ABD: SOFT, NT, NO, BS +  EXT: NO PE  SKIN: WARM,   SKELETAL:  ROM ACCEPTABLE  CNS:  AAO X 3    RADIOLOGY :    ACC: [id number] EXAM:  CT BRAIN                          PROCEDURE DATE:  08/08/2022          INTERPRETATION:  NONCONTRAST CT OF THE BRAIN DATED 8/8/2022.    CLINICAL INFORMATION:  head trauma    TECHNIQUE: Axial CT images are obtained from the cranial vertex to the   skull base without the administration of IV contrast. Images are   reformatted in sagittal and coronal planes.    No prior studies are available for comparison.    FINDINGS:    Evaluation through the posterior fossa is degraded by streak artifact   from dental amalgam. There is no gross acute intra-axial or extra-axial   hemorrhage. There is no significant mass effect or shift of the midline.   The basal cisterns are not effaced. There is cerebral and cerebellar   volume loss with prominence of the ventricles, sulci, and cerebellar   folia. There are minimal chronic ischemic changes in the frontoparietal   white matter. There are atherosclerotic calcifications of the   intracranial carotid arteries and intradural vertebral arteries.    There is no significant scalp soft tissue swelling or scalp hematoma. The   skull base and bony calvarium are intact. The visualized paranasal   sinuses and tympanic/mastoid cavities are clear apart from mild bilateral   ethmoid mucosal thickening. There is evidence of bilateral lens surgery.    IMPRESSION:    Evaluation through the posterior fossa degraded by streak artifact from   dental amalgam. No gross acute intracranial hemorrhage, mass effect, or   acute osseous fracture.    =====================    ACC: [id number] EXAM:  CT CERVICAL SPINE                          PROCEDURE DATE:  08/08/2022          INTERPRETATION:  CLINICAL INFORMATION:  head trauma      TECHNIQUE: Thin section axial CT images are obtained from the skullbase   through the thoracic inlet and a study dedicated to evaluate the cervical   spine. Images are reformatted in the sagittal and coronal planes.    No prior studies are available for comparison.    FINDINGS:    There is nonspecific straightening of the cervical spine lordosis. There   is no acute cervical spine fracture or evidence of traumatic   malalignment. There is no significant prevertebral soft tissue   swelling/hematoma. There are multilevel degenerative changes with   multilevel intervertebral disc space narrowing, ventral osteophytes, disc   bulges and disc osteophyte complexes, and facet and uncinate hypertrophy   contributing to mild multilevel segmental canal stenosis and varying   degrees of neural foraminal stenosis. The regional soft tissues of the   neck are otherwise unremarkable. The lung apices are clear.      IMPRESSION:    No acute cervical spine fracture or evidence of traumatic malalignment.   Cervical spondylosis.    ============================    ACC: [id number] EXAM:  CT HIP ONLY LT                          PROCEDURE DATE:  08/08/2022          INTERPRETATION:  CT HIP LEFT dated 8/8/2022 9:03 AM    INDICATION: Left hip pain after trauma    COMPARISON: Pelvic radiograph dated 8/7/2022    TECHNIQUE: CT imaging of the pelvis was performed. The data was   reformatted in the axial, coronal, and sagittal planes. Additionally, 3-D   reformatted imaging was created.    FINDINGS: Mildly degraded by motion artifact.    OSSEOUS STRUCTURES: There is no fracture. There is mild bilateral hip   joint space narrowing. Productive changes of the lower lumbar spine.   Marked narrowing the pubic symphysis with sclerosis and small cystic   change. Subcentimeter bone island the left femoral head.  SYNOVIUM/ JOINT FLUID: No hip joint effusion.  TENDONS: The tendons are intact.  MUSCLES: No muscle hematoma is noted.  NEUROVASCULAR STRUCTURES: Moderate vascular calcifications present.  INTRAPELVIC SOFT TISSUES: Large fat and nonobstructed bowel containing  left abdominal wall hernia.  SUBCUTANEOUS SOFT TISSUES: No soft tissue swelling.    3-D reformatted imaging confirms these findings.    IMPRESSION:    No fracture identified.  Degenerative changes.      ASSESSMENT :     Pain of left hip    Obesity (ICD9 278.00)    systemic edema    Acne Cystica (ICD9 706.1)    CHF (Congestive Heart Failure) (ICD9 428.0)    DENIS (Obstructive Sleep Apnea) (ICD9 327.23)    Endometrial cancer    Sciatica    C Section 1965    cyst removed from the neck 1966    History of Dilatation and Curettage (ICD9 V45.89)    H/O: hysterectomy    S/P partial lobectomy of lung        PLAN:  HPI:  87 y/o female, coming form home, ambulates independently, with PMHx of Uterine Cancer (that has metastasized to the lungs), Sciatica, and neuropathy in the legs presents to the ED after she fell coming out of the bathroom and into her bedroom on Saturday (8/6) at 7:30 am. She said she slipped on the tile and hit her head on the plant stand, but couldn't stand up after. She reports crawling to the nearest phone to call her daughter, as she lives alone with no HHA, patient claims to wait for her daughter to help her get up. Patient states that she was on the floor for an unknown amount of time. Patient is AOx2-3 but an unreliable historian, collateral obtained from daughter, Batsheva. Per daughter, she states that patient living conditions are concerning, as she is not able to care for herself. Daughter is overwhelmed, worries that her mother is not following up with her own health and finances. Patient also states that she has difficulty ambulating, but does not think she can walk after fall. Patient denies feeling light-headed or dizzy prior to falling, and denies LOC post-fall. She said the pain became apparent later that night in the pelvic area and knees and rated the pain a 9/10.  (08 Aug 2022 11:23)    # PT RECOMMENDATION FOR [facility] - CM TEAM TO COORDINATE W/ FAMILY    # S/P MECHANICAL FALL W/ TRAUMA TO POSTERIOR SCALP AND HIP  # AMBULATORY DYSFUNCTION  # HX OF SCIATICA  - NOTED CT HEAD AND HIP  - F/U ORTHOSTATIC VITALS  - NOTED PT EVAL  - PAIN MANAGEMENT CONSULT    # RHABDOMYOLYSIS - IMPROVED  - S/P IVF    # LEUKOCYTOSIS - SUSPECT REACTIVE - RESOLVING  - DENIES DYSURIA, COUGH, RASHES, N/V/C/D, ABDOMINAL PAIN    # ABDOMINAL WALL HERNIA - PER PATIENT CHRONIC, AND HAS PREVIOUSLY BEEN EVALUATED BY SURGICAL     # GI AND DVT PPX.

## 2022-08-11 LAB
ANION GAP SERPL CALC-SCNC: 8 MMOL/L — SIGNIFICANT CHANGE UP (ref 5–17)
BUN SERPL-MCNC: 14 MG/DL — SIGNIFICANT CHANGE UP (ref 7–18)
CALCIUM SERPL-MCNC: 8.7 MG/DL — SIGNIFICANT CHANGE UP (ref 8.4–10.5)
CHLORIDE SERPL-SCNC: 111 MMOL/L — HIGH (ref 96–108)
CK SERPL-CCNC: 483 U/L — HIGH (ref 21–215)
CO2 SERPL-SCNC: 24 MMOL/L — SIGNIFICANT CHANGE UP (ref 22–31)
CREAT SERPL-MCNC: 0.64 MG/DL — SIGNIFICANT CHANGE UP (ref 0.5–1.3)
EGFR: 87 ML/MIN/1.73M2 — SIGNIFICANT CHANGE UP
GLUCOSE SERPL-MCNC: 94 MG/DL — SIGNIFICANT CHANGE UP (ref 70–99)
HCT VFR BLD CALC: 36.6 % — SIGNIFICANT CHANGE UP (ref 34.5–45)
HGB BLD-MCNC: 11.7 G/DL — SIGNIFICANT CHANGE UP (ref 11.5–15.5)
MCHC RBC-ENTMCNC: 30.4 PG — SIGNIFICANT CHANGE UP (ref 27–34)
MCHC RBC-ENTMCNC: 32 GM/DL — SIGNIFICANT CHANGE UP (ref 32–36)
MCV RBC AUTO: 95.1 FL — SIGNIFICANT CHANGE UP (ref 80–100)
NRBC # BLD: 0 /100 WBCS — SIGNIFICANT CHANGE UP (ref 0–0)
PLATELET # BLD AUTO: 257 K/UL — SIGNIFICANT CHANGE UP (ref 150–400)
POTASSIUM SERPL-MCNC: 3.7 MMOL/L — SIGNIFICANT CHANGE UP (ref 3.5–5.3)
POTASSIUM SERPL-SCNC: 3.7 MMOL/L — SIGNIFICANT CHANGE UP (ref 3.5–5.3)
RBC # BLD: 3.85 M/UL — SIGNIFICANT CHANGE UP (ref 3.8–5.2)
RBC # FLD: 13.2 % — SIGNIFICANT CHANGE UP (ref 10.3–14.5)
SODIUM SERPL-SCNC: 143 MMOL/L — SIGNIFICANT CHANGE UP (ref 135–145)
WBC # BLD: 6.07 K/UL — SIGNIFICANT CHANGE UP (ref 3.8–10.5)
WBC # FLD AUTO: 6.07 K/UL — SIGNIFICANT CHANGE UP (ref 3.8–10.5)

## 2022-08-11 PROCEDURE — 99232 SBSQ HOSP IP/OBS MODERATE 35: CPT

## 2022-08-11 RX ORDER — ACETAMINOPHEN 500 MG
0 TABLET ORAL
Qty: 0 | Refills: 0 | DISCHARGE

## 2022-08-11 RX ORDER — ANASTROZOLE 1 MG/1
1 TABLET ORAL DAILY
Refills: 0 | Status: DISCONTINUED | OUTPATIENT
Start: 2022-08-11 | End: 2022-08-12

## 2022-08-11 RX ORDER — BUMETANIDE 0.25 MG/ML
0 INJECTION INTRAMUSCULAR; INTRAVENOUS
Qty: 0 | Refills: 0 | DISCHARGE

## 2022-08-11 RX ORDER — ANASTROZOLE 1 MG/1
0 TABLET ORAL
Qty: 0 | Refills: 0 | DISCHARGE

## 2022-08-11 RX ORDER — LEVOTHYROXINE SODIUM 125 MCG
0 TABLET ORAL
Qty: 0 | Refills: 0 | DISCHARGE

## 2022-08-11 RX ORDER — ACETAMINOPHEN 500 MG
1000 TABLET ORAL EVERY 8 HOURS
Refills: 0 | Status: DISCONTINUED | OUTPATIENT
Start: 2022-08-11 | End: 2022-08-12

## 2022-08-11 RX ADMIN — PANTOPRAZOLE SODIUM 40 MILLIGRAM(S): 20 TABLET, DELAYED RELEASE ORAL at 06:49

## 2022-08-11 RX ADMIN — Medication 1000 MILLIGRAM(S): at 22:37

## 2022-08-11 RX ADMIN — LIDOCAINE 1 PATCH: 4 CREAM TOPICAL at 19:42

## 2022-08-11 RX ADMIN — Medication 50 MICROGRAM(S): at 06:49

## 2022-08-11 RX ADMIN — Medication 1000 MILLIGRAM(S): at 13:26

## 2022-08-11 RX ADMIN — Medication 1 TABLET(S): at 11:16

## 2022-08-11 RX ADMIN — Medication 375 MILLIGRAM(S): at 03:40

## 2022-08-11 RX ADMIN — BUMETANIDE 2 MILLIGRAM(S): 0.25 INJECTION INTRAMUSCULAR; INTRAVENOUS at 06:48

## 2022-08-11 RX ADMIN — Medication 400 UNIT(S): at 11:16

## 2022-08-11 RX ADMIN — ENOXAPARIN SODIUM 40 MILLIGRAM(S): 100 INJECTION SUBCUTANEOUS at 06:49

## 2022-08-11 RX ADMIN — LIDOCAINE 1 PATCH: 4 CREAM TOPICAL at 11:18

## 2022-08-11 RX ADMIN — BENZOCAINE AND MENTHOL 1 LOZENGE: 5; 1 LIQUID ORAL at 21:33

## 2022-08-11 RX ADMIN — Medication 1000 MILLIGRAM(S): at 21:33

## 2022-08-11 RX ADMIN — ESCITALOPRAM OXALATE 15 MILLIGRAM(S): 10 TABLET, FILM COATED ORAL at 11:16

## 2022-08-11 RX ADMIN — Medication 375 MILLIGRAM(S): at 04:30

## 2022-08-11 RX ADMIN — Medication 1000 MILLIGRAM(S): at 07:46

## 2022-08-11 RX ADMIN — LIDOCAINE 1 PATCH: 4 CREAM TOPICAL at 02:16

## 2022-08-11 RX ADMIN — LIDOCAINE 1 PATCH: 4 CREAM TOPICAL at 23:53

## 2022-08-11 RX ADMIN — ANASTROZOLE 1 MILLIGRAM(S): 1 TABLET ORAL at 12:23

## 2022-08-11 RX ADMIN — Medication 1000 MILLIGRAM(S): at 14:18

## 2022-08-11 RX ADMIN — Medication 500 MILLIGRAM(S): at 11:16

## 2022-08-11 RX ADMIN — Medication 1000 MILLIGRAM(S): at 06:48

## 2022-08-11 NOTE — PROGRESS NOTE ADULT - PROBLEM SELECTOR PLAN 1
Pt with acute left hip pain which is somatic in nature due to fall. + rhabdo CK decreased from 1882 (8/8) -> 678 (8/9)-> 621 (8/8) -> 483 (8/9). CT hip negative fx. + degenerative changes. + hx chronic low back pain with b/l sciatica and neuropathy. + hx anxiety, restarted home lexapro 15mg PO daily 8/9, verified by pharmacy.   High risk medications reviewed. Avoid polypharmacy. Avoid IV opioids. Avoid benzodiazepines. Non-pharmacological sleep aides initiated. Non-opioid medications and non-pharmacological pain management measures initiated.   Pt with allergy to codeine- SOB.   Maximize non-opioid pain recommendations   - Renewed Acetaminophen 1 gram PO q 8 hours x 3 days  - Continue Naproxen 375mg PO q8h PRN moderate pain   - Pt refused gabapentin   - Continue Lidoderm 4% patch daily.   Bowel Regimen  - Per primary team  Mild pain   - Non-pharmacological pain treatment recommendations  - Warm/ Cool packs PRN   - Repositioning extremity, imagery, relaxation, distraction.  - Physical therapy OOB if no contraindications   Recommendations discussed with primary team and RN.

## 2022-08-11 NOTE — PROGRESS NOTE ADULT - SUBJECTIVE AND OBJECTIVE BOX
Patient is a 86y old  Female who presents with a chief complaint of Fall (11 Aug 2022 10:24)      INTERVAL HPI/OVERNIGHT EVENTS: no overnight events    I&O's Summary    10 Aug 2022 07:01  -  11 Aug 2022 07:00  --------------------------------------------------------  IN: 850 mL / OUT: 1 mL / NET: 849 mL      Vital Signs Last 24 Hrs  T(C): 36.7 (11 Aug 2022 13:21), Max: 36.7 (11 Aug 2022 13:21)  T(F): 98 (11 Aug 2022 13:21), Max: 98 (11 Aug 2022 13:21)  HR: 64 (11 Aug 2022 13:21) (64 - 68)  BP: 129/72 (11 Aug 2022 13:21) (108/47 - 157/66)  BP(mean): --  RR: 18 (11 Aug 2022 13:21) (18 - 18)  SpO2: 98% (11 Aug 2022 13:21) (94% - 98%)    Parameters below as of 11 Aug 2022 13:21  Patient On (Oxygen Delivery Method): room air      PAST MEDICAL & SURGICAL HISTORY:  Obesity (ICD9 278.00)      systemic edema      Acne Cystica (ICD9 706.1)      CHF (Congestive Heart Failure) (ICD9 428.0)      DENIS (Obstructive Sleep Apnea) (ICD9 327.23)      Endometrial cancer      Sciatica      Anxiety      C Section 1965      cyst removed from the neck 1966      History of Dilatation and Curettage (ICD9 V45.89)      H/O: hysterectomy      S/P partial lobectomy of lung          SOCIAL HISTORY  Alcohol:  Tobacco:  Illicit substance use:      FAMILY HISTORY:      LABS:                        11.7   6.07  )-----------( 257      ( 11 Aug 2022 07:55 )             36.6     08-11    143  |  111<H>  |  14  ----------------------------<  94  3.7   |  24  |  0.64    Ca    8.7      11 Aug 2022 07:55          CAPILLARY BLOOD GLUCOSE        MEDICATIONS  (STANDING):  acetaminophen     Tablet .. 1000 milliGRAM(s) Oral every 8 hours  anastrozole 1 milliGRAM(s) Oral daily  ascorbic acid 500 milliGRAM(s) Oral daily  buMETAnide 2 milliGRAM(s) Oral daily  cholecalciferol 400 Unit(s) Oral daily  enoxaparin Injectable 40 milliGRAM(s) SubCutaneous every 24 hours  escitalopram 15 milliGRAM(s) Oral daily  levothyroxine 50 MICROGram(s) Oral daily  lidocaine   4% Patch 1 Patch Transdermal daily  multivitamin 1 Tablet(s) Oral daily  polyethylene glycol 3350 17 Gram(s) Oral daily  senna 1 Tablet(s) Oral at bedtime  sodium chloride 0.9%. 1000 milliLiter(s) (85 mL/Hr) IV Continuous <Continuous>  sodium chloride 0.9%. 1000 milliLiter(s) (85 mL/Hr) IV Continuous <Continuous>    MEDICATIONS  (PRN):  benzocaine 15 mG/menthol 3.6 mG Lozenge 1 Lozenge Oral three times a day PRN Mouth Sores  melatonin 3 milliGRAM(s) Oral at bedtime PRN Insomnia  naproxen 375 milliGRAM(s) Oral every 8 hours PRN Moderate Pain (4 - 6)  ondansetron Injectable 4 milliGRAM(s) IV Push every 8 hours PRN Nausea and/or Vomiting      REVIEW OF SYSTEMS:  CONSTITUTIONAL: No fever  EYES: No eye pain, visual disturbances  ENMT:  No sinus or throat pain  NECK: No pain or stiffness  RESPIRATORY: No cough, No shortness of breath  CARDIOVASCULAR: No chest pain, palpitations, dizziness. + leg swelling  GASTROINTESTINAL: No abdominal  pain. No nausea, vomiting, or hematemesis; No diarrhea or constipation.  GENITOURINARY: No dysuria,  hematuria  NEUROLOGICAL: No headaches,  SKIN: No  rashes, or lesions   MUSCULOSKELETAL: No joint pain or swelling; No muscle, back, or extremity pain    RADIOLOGY & ADDITIONAL TESTS:  < from: CT Chest No Cont (08.09.21 @ 14:23) >    EXAM:  CT CHEST        PROCEDURE DATE:  08/09/2021           INTERPRETATION:  Reason for Exam:  Endometrial neoplasm    CT of the chest was performed from the thoracic inlet to the level of the adrenal glands without contrast injection.    Comparison: June 9, 2020    Tubes/Lines: None.    Mediastinum/Vessels/Heart: Aorta and pulmonary arteries are normal in size. There is no pericardial effusion. No lymphadenopathy. Thyroid gland is unremarkable    Lungs/Pleura/Airways: Status post left upper lobectomy with associated volume loss and architectural distortion. Minimal linear scarring and/or atelectasis in the left lower lobe, unchanged since prior. No suspicious nodules.    Visualized abdomen: Unremarkable noncontrast appearance of the upperabdomen    Bones and soft tissues: No suspicious osseous lesions. Degenerative changes noted throughout the spine.    IMPRESSION:    No intrathoracic metastatic disease. No change since prior    --- End of Report ---               WILBERT ZURITA MD; Attending Radiologist   This document has been electronically signed. Aug 10 2021 10:52AM    < end of copied text >  < from: Xray Pelvis AP only (08.08.22 @ 00:02) >  ACC: [id number] EXAM:  XR PELVIS AP ONLY 1-2 VIEWS                          PROCEDURE DATE:  08/08/2022          INTERPRETATION:  Pelvis. Patient had a fall.    Clips in the pelvis noted on both sides.    There is bilateral hip degeneration moderate on the left and mild on the   right.    No visible fracture.    Findings are similar to CAT scan of January 26, 2021.    IMPRESSION: Degeneration as above.    < end of copied text >  < from: CT Cervical Spine No Cont (08.08.22 @ 02:11) >  ACC: [id number] EXAM:  CT CERVICAL SPINE                          PROCEDURE DATE:  08/08/2022          INTERPRETATION:  CLINICAL INFORMATION:  head trauma      TECHNIQUE: Thin section axial CT images are obtained from the skullbase   through the thoracic inlet and a study dedicated to evaluate the cervical   spine. Images are reformatted in the sagittal and coronal planes.    No prior studies are available for comparison.    FINDINGS:    There is nonspecific straightening of the cervical spine lordosis. There   is no acute cervical spine fracture or evidence of traumatic   malalignment. There is no significant prevertebral soft tissue   swelling/hematoma. There are multilevel degenerative changes with   multilevel intervertebral disc space narrowing, ventral osteophytes, disc   bulges and disc osteophyte complexes, and facet and uncinate hypertrophy   contributing to mild multilevel segmental canal stenosis and varying   degrees of neural foraminal stenosis. The regional soft tissues of the   neck are otherwise unremarkable. The lung apices are clear.      IMPRESSION:    No acute cervical spine fracture or evidence of traumatic malalignment.   Cervical spondylosis.    --- End of Report ---            DOROTHY LÓPEZ DO; Attending Radiologist  This document has been electronically signed. Aug  8 2022  2:53AM    < end of copied text >  < from: CT Head No Cont (08.08.22 @ 02:11) >    ACC: [id number] EXAM:  CT BRAIN                          PROCEDURE DATE:  08/08/2022          INTERPRETATION:  NONCONTRAST CT OF THE BRAIN DATED 8/8/2022.    CLINICAL INFORMATION:  head trauma    TECHNIQUE: Axial CT images are obtained from the cranial vertex to the   skull base without the administration of IV contrast. Images are   reformatted in sagittal and coronal planes.    No prior studies are available for comparison.    FINDINGS:    Evaluation through the posterior fossa is degraded by streak artifact   from dental amalgam. There is no gross acute intra-axial or extra-axial   hemorrhage. There is no significant mass effect or shift of the midline.   The basal cisterns are not effaced. There is cerebral and cerebellar   volume loss with prominence of the ventricles, sulci, and cerebellar   folia. There are minimal chronic ischemic changes in the frontoparietal   white matter. There are atherosclerotic calcifications of the   intracranial carotid arteries and intradural vertebral arteries.    There is no significant scalp soft tissue swelling or scalp hematoma. The   skull base and bony calvarium are intact. The visualized paranasal   sinuses and tympanic/mastoid cavities are clear apart from mild bilateral   ethmoid mucosal thickening. There is evidence of bilateral lens surgery.    IMPRESSION:    Evaluation through the posterior fossa degraded by streak artifact from   dental amalgam. No gross acute intracranial hemorrhage, mass effect, or   acute osseous fracture.    ---End of Report ---            DOROTHY LÓPEZ DO; Attending Radiologist  This document has been electronically signed. Aug  8 2022  2:51AM    < end of copied text >  < from: CT Hip No Cont, Left (08.08.22 @ 09:03) >  ACC: [id number] EXAM:  CT HIP ONLY LT                          PROCEDURE DATE:  08/08/2022          INTERPRETATION:  CT HIP LEFT dated 8/8/2022 9:03 AM    INDICATION: Left hip pain after trauma    COMPARISON: Pelvic radiograph dated 8/7/2022    TECHNIQUE: CT imaging of the pelvis was performed. The data was   reformatted in the axial, coronal, and sagittal planes. Additionally, 3-D   reformatted imaging was created.    FINDINGS: Mildly degraded by motion artifact.    OSSEOUS STRUCTURES: There is no fracture. There is mild bilateral hip   joint space narrowing. Productive changes of the lower lumbar spine.   Marked narrowing the pubic symphysis with sclerosis and small cystic   change. Subcentimeter bone island the left femoral head.  SYNOVIUM/ JOINT FLUID: No hip joint effusion.  TENDONS: The tendons are intact.  MUSCLES: No muscle hematoma is noted.  NEUROVASCULAR STRUCTURES: Moderate vascular calcifications present.  INTRAPELVIC SOFT TISSUES: Large fat and nonobstructed bowel containing  left abdominal wall hernia.  SUBCUTANEOUS SOFT TISSUES: No soft tissue swelling.    3-D reformatted imaging confirms these findings.    IMPRESSION:    No fracture identified.  Degenerative changes.    --- End of Report ---            LILIA MERAZ MD; Attending Radiologist  This document has been electronically signed. Aug  8 2022  9:35AM    < end of copied text >    Imaging Personally Reviewed:  [x ] YES  [ ] NO    Consultant(s) Notes Reviewed:  [x ] YES  [ ] NO    PHYSICAL EXAM:  GENERAL: NAD  HEAD:  Atraumatic, Normocephalic  EYES:  conjunctiva and sclera clear  ENMT: Moist mucous membranes  NECK: Supple, No JVD  NERVOUS SYSTEM:  Alert & Oriented X3, Good concentration;  CHEST/LUNG: CTA bilaterally; No rales, rhonchi, wheezing, or rubs  HEART: Regular rate and rhythm  ABDOMEN: Soft, Nontender, Nondistended; Bowel sounds present  EXTREMITIES:  2+ Peripheral Pulses, No clubbing, cyanosis. + b/l ext edema  SKIN: No rashes or lesions    Care Collaborated Discussed with Consultants/Other Providers [ x] YES  [ ] NO

## 2022-08-11 NOTE — PROGRESS NOTE ADULT - SUBJECTIVE AND OBJECTIVE BOX
Source of information: CHAYO HERNANDEZMARIA ALEJANDRA, Chart review  Patient language: English  : n/a    HPI:  An 85 y/o female presents to the ED after she fell coming out of the bathroom and into her bedroom on Saturday (8/6) at 7:30 am. She said she slipped on the tile and hit her head on the plant stand, but couldn't stand up after. She denies feeling light-headed or dizzy prior to falling, and denies LOC post-fall. She said the pain became apparent later that night in the pelvic area and knees and rated the pain a 9/10. She says she is unable to stand or walk now.  (08 Aug 2022 11:23)      Pt is admitted for fall. CT hip negative fx. + degenerative changes. Pain consulted for left hip pain. Reports left hip pain started after her fall on Saturday, states she slipped at home on her floor which had a small amount of water and hit her head and left side on the floor. + rhabdo CK decreased from 1882 (8/8) -> 678 (8/9)-> 621 (8/8) -> 483 (8/9).     Pt seen and examined at bedside. Pt sitting in chair, reports her pain has improved with current pain regimen. Reports left hip pain 8/10, aching, radiating to left groin, alleviated by rest and pain medications, exacerbated by movement. Encouraged PRN naproxen use. Pt also reports hx chronic low back pain with b/l sciatica for which she take PRN tylenol and motrin at home. + chronic b/l LE numbness. Pt tolerating PO diet. Denies lethargy, chest pain, SOB, nausea, vomiting, constipation. Reports last BM 8/9. Patient stated goal for pain control: to be able to take deep breaths, get out of bed to chair and ambulate with tolerable pain control. Pt out of bed to chair. Seen by PT 8/9, recommend SARINA.     PAST MEDICAL & SURGICAL HISTORY:  Obesity (ICD9 278.00)      systemic edema      Acne Cystica (ICD9 706.1)      CHF (Congestive Heart Failure) (ICD9 428.0)      DENIS (Obstructive Sleep Apnea) (ICD9 327.23)      Endometrial cancer      Sciatica      C Section 1965      cyst removed from the neck 1966      History of Dilatation and Curettage (ICD9 V45.89)      H/O: hysterectomy      S/P partial lobectomy of lung          FAMILY HISTORY:      Social History:  Lives alone; No aid; Uses a cane to walk outside the house (08 Aug 2022 11:23)   [X ] Denies ETOH use, illicit drug use and smoking    Allergies    No Known Allergies    Intolerances    codeine (Vomiting; Short breath; Nausea)    MEDICATIONS  (STANDING):  acetaminophen     Tablet .. 1000 milliGRAM(s) Oral every 8 hours  ascorbic acid 500 milliGRAM(s) Oral daily  buMETAnide 2 milliGRAM(s) Oral daily  cholecalciferol 400 Unit(s) Oral daily  enoxaparin Injectable 40 milliGRAM(s) SubCutaneous every 24 hours  escitalopram 15 milliGRAM(s) Oral daily  levothyroxine 50 MICROGram(s) Oral daily  lidocaine   4% Patch 1 Patch Transdermal daily  multivitamin 1 Tablet(s) Oral daily  polyethylene glycol 3350 17 Gram(s) Oral daily  senna 1 Tablet(s) Oral at bedtime  sodium chloride 0.9%. 1000 milliLiter(s) (85 mL/Hr) IV Continuous <Continuous>  sodium chloride 0.9%. 1000 milliLiter(s) (85 mL/Hr) IV Continuous <Continuous>    MEDICATIONS  (PRN):  benzocaine 15 mG/menthol 3.6 mG Lozenge 1 Lozenge Oral three times a day PRN Mouth Sores  melatonin 3 milliGRAM(s) Oral at bedtime PRN Insomnia  naproxen 375 milliGRAM(s) Oral every 8 hours PRN Moderate Pain (4 - 6)  ondansetron Injectable 4 milliGRAM(s) IV Push every 8 hours PRN Nausea and/or Vomiting      Vital Signs Last 24 Hrs  T(C): 36.5 (11 Aug 2022 05:41), Max: 36.7 (10 Aug 2022 13:44)  T(F): 97.7 (11 Aug 2022 05:41), Max: 98.1 (10 Aug 2022 13:44)  HR: 66 (11 Aug 2022 05:41) (66 - 74)  BP: 157/66 (11 Aug 2022 05:41) (108/47 - 157/66)  BP(mean): --  RR: 18 (11 Aug 2022 05:41) (17 - 18)  SpO2: 94% (11 Aug 2022 05:41) (94% - 99%)    Parameters below as of 11 Aug 2022 05:41  Patient On (Oxygen Delivery Method): room air      COVID-19 PCR: NotDetec (08 Aug 2022 05:19)    LABS: Reviewed                          11.7   6.07  )-----------( 257      ( 11 Aug 2022 07:55 )             36.6     08-11    143  |  111<H>  |  14  ----------------------------<  94  3.7   |  24  |  0.64    Ca    8.7      11 Aug 2022 07:55    COVID-19 PCR: NotDetec (08 Aug 2022 05:19)    Radiology: Reviewed.   < from: CT Head No Cont (08.08.22 @ 02:11) >    ACC: [id number] EXAM:  CT BRAIN                          PROCEDURE DATE:  08/08/2022          INTERPRETATION:  NONCONTRAST CT OF THE BRAIN DATED 8/8/2022.    CLINICAL INFORMATION:  head trauma    TECHNIQUE: Axial CT images are obtained from the cranial vertex to the   skull base without the administration of IV contrast. Images are   reformatted in sagittal and coronal planes.    No prior studies are available for comparison.    FINDINGS:    Evaluation through the posterior fossa is degraded by streak artifact   from dental amalgam. There is no gross acute intra-axial or extra-axial   hemorrhage. There is no significant mass effect or shift of the midline.   The basal cisterns are not effaced. There is cerebral and cerebellar   volume loss with prominence of the ventricles, sulci, and cerebellar   folia. There are minimal chronic ischemic changes in the frontoparietal   white matter. There are atherosclerotic calcifications of the   intracranial carotid arteries and intradural vertebral arteries.    There is no significant scalp soft tissue swelling or scalp hematoma. The   skull base and bony calvarium are intact. The visualized paranasal   sinuses and tympanic/mastoid cavities are clear apart from mild bilateral   ethmoid mucosal thickening. There is evidence of bilateral lens surgery.    IMPRESSION:    Evaluation through the posterior fossa degraded by streak artifact from   dental amalgam. No gross acute intracranial hemorrhage, mass effect, or   acute osseous fracture.    ---End of Report ---            DOROTHY LÓPEZ DO; Attending Radiologist  This document has been electronically signed. Aug  8 2022  2:51AM    < end of copied text >    < from: CT Hip No Cont, Left (08.08.22 @ 09:03) >    ACC: [id number] EXAM:  CT HIP ONLY LT                          PROCEDURE DATE:  08/08/2022          INTERPRETATION:  CT HIP LEFT dated 8/8/2022 9:03 AM    INDICATION: Left hip pain after trauma    COMPARISON: Pelvic radiograph dated 8/7/2022    TECHNIQUE: CT imaging of the pelvis was performed. The data was   reformatted in the axial, coronal, and sagittal planes. Additionally, 3-D   reformatted imaging was created.    FINDINGS: Mildly degraded by motion artifact.    OSSEOUS STRUCTURES: There is no fracture. There is mild bilateral hip   joint space narrowing. Productive changes of the lower lumbar spine.   Marked narrowing the pubic symphysis with sclerosis and small cystic   change. Subcentimeter bone island the left femoral head.  SYNOVIUM/ JOINT FLUID: No hip joint effusion.  TENDONS: The tendons are intact.  MUSCLES: No muscle hematoma is noted.  NEUROVASCULAR STRUCTURES: Moderate vascular calcifications present.  INTRAPELVIC SOFT TISSUES: Large fat and nonobstructed bowel containing  left abdominal wall hernia.  SUBCUTANEOUS SOFT TISSUES: No soft tissue swelling.    3-D reformatted imaging confirms these findings.    IMPRESSION:    No fracture identified.  Degenerative changes.    --- End of Report ---            LILIA MERAZ MD; Attending Radiologist  This document has been electronically signed. Aug  8 2022  9:35AM    < end of copied text >      ORT Score -   Family Hx of substance abuse	Female	      Male  Alcohol 	                                           1                     3  Illegal drugs	                                   2                     3  Rx drugs                                           4 	                  4  Personal Hx of substance abuse		  Alcohol 	                                          3	                  3  Illegal drugs                                     4	                  4  Rx drugs                                            5 	                  5  Age between 16- 45 years	           1                     1  hx preadolescent sexual abuse	   3 	                  0  Psychological disease		  ADD, OCD, bipolar, schizophrenia   2	          2  Depression                                           1 	          1  Total: 0    a score of 3 or lower indicates low risk for opioid abuse		  a score of 4-7 indicates moderate risk for opioid abuse		  a score of 8 or higher indicates high risk for opioid abuse    REVIEW OF SYSTEMS:  CONSTITUTIONAL: No fever or fatigue  HEENT:  No difficulty hearing, no change in vision  NECK: No pain or stiffness  RESPIRATORY: No cough, wheezing, chills or hemoptysis; No shortness of breath  CARDIOVASCULAR: No chest pain, palpitations, dizziness, or leg swelling  GASTROINTESTINAL: No loss of appetite, decreased PO intake. No abdominal or epigastric pain. No nausea, vomiting; No diarrhea or constipation. + abdominal hernia   GENITOURINARY: No dysuria, frequency, hematuria, retention or incontinence  MUSCULOSKELETAL: + left hip and groin pain. + chronic back pain with b/l sciatica. + leg swelling; + decreased ROM b/l upper extremities and left lower extremities; no upper or lower motor strength weakness, no saddle anesthesia, bowel/bladder incontinence, + falls   NEURO: No headaches, + chronic numbness/tingling b/l LE, No weakness  PSYCH: hx anxiety    PHYSICAL EXAM:  GENERAL:  Alert & Oriented X4, cooperative, NAD, Good concentration. Speech is clear.   RESPIRATORY: Respirations even and unlabored. Clear to auscultation bilaterally; No rales, rhonchi, wheezing, or rubs  CARDIOVASCULAR: Normal S1/S2, regular rate and rhythm; No murmurs, rubs, or gallops. No JVD.   GASTROINTESTINAL: + obese per BMI, hernia, Soft, Nontender, Nondistended; Bowel sounds present  PERIPHERAL VASCULAR:  Extremities warm with moderate b/l leg edema. 2+ Peripheral Pulses, No cyanosis, No calf tenderness  MUSCULOSKELETAL: Motor Strength 5/5 B/L upper and 3/5 lower extremities; + decreased ROM left LE and b/l upper extremities; negative SLR; + left hip tenderness on palpation.    SKIN: Warm, dry, intact. No rashes, lesions, scars or wounds.     Risk factors associated with adverse outcomes related to opioid treatment  [ ]  Concurrent benzodiazepine use  [ ]  History/ Active substance use or alcohol use disorder  [X ] Psychiatric co-morbidity  [X ] Sleep apnea  [ ] COPD  [X ] BMI> 35  [ ] Liver dysfunction  [ ] Renal dysfunction  [X ] CHF  [ ] Smoker  [X ]  Age > 60 years    [X ]  [facility]  Reviewed and Copied to Chart. See below.    Plan of care and goal oriented pain management treatment options were discussed with patient and /or primary care giver; all questions and concerns were addressed and care was aligned with patient's wishes.    Educated patient on goal oriented pain management treatment options     08-11-22 @ 10:03

## 2022-08-11 NOTE — PROGRESS NOTE ADULT - SUBJECTIVE AND OBJECTIVE BOX
`Patient is a 86y old  Female who presents with a chief complaint of Fall (11 Aug 2022 10:02)    PATIENT IS SEEN AND EXAMINED IN MEDICAL FLOOR.  NGT [    ]    COX [   ]      GT [   ]    ALLERGIES:  codeine (Vomiting; Short breath; Nausea)  No Known Allergies      Daily     Daily     VITALS:    Vital Signs Last 24 Hrs  T(C): 36.5 (11 Aug 2022 05:41), Max: 36.7 (10 Aug 2022 13:44)  T(F): 97.7 (11 Aug 2022 05:41), Max: 98.1 (10 Aug 2022 13:44)  HR: 66 (11 Aug 2022 05:41) (66 - 74)  BP: 157/66 (11 Aug 2022 05:41) (108/47 - 157/66)  BP(mean): --  RR: 18 (11 Aug 2022 05:41) (17 - 18)  SpO2: 94% (11 Aug 2022 05:41) (94% - 99%)    Parameters below as of 11 Aug 2022 05:41  Patient On (Oxygen Delivery Method): room air        LABS:    CBC Full  -  ( 11 Aug 2022 07:55 )  WBC Count : 6.07 K/uL  RBC Count : 3.85 M/uL  Hemoglobin : 11.7 g/dL  Hematocrit : 36.6 %  Platelet Count - Automated : 257 K/uL  Mean Cell Volume : 95.1 fl  Mean Cell Hemoglobin : 30.4 pg  Mean Cell Hemoglobin Concentration : 32.0 gm/dL  Auto Neutrophil # : x  Auto Lymphocyte # : x  Auto Monocyte # : x  Auto Eosinophil # : x  Auto Basophil # : x  Auto Neutrophil % : x  Auto Lymphocyte % : x  Auto Monocyte % : x  Auto Eosinophil % : x  Auto Basophil % : x      08-11    143  |  111<H>  |  14  ----------------------------<  94  3.7   |  24  |  0.64    Ca    8.7      11 Aug 2022 07:55      CAPILLARY BLOOD GLUCOSE        CARDIAC MARKERS ( 11 Aug 2022 07:55 )  x     / x     / 483 U/L / x     / x      CARDIAC MARKERS ( 10 Aug 2022 05:30 )  x     / x     / 621 U/L / x     / x            Creatinine Trend: 0.64<--, 0.60<--, 0.55<--, 0.83<--  I&O's Summary    10 Aug 2022 07:01  -  11 Aug 2022 07:00  --------------------------------------------------------  IN: 850 mL / OUT: 1 mL / NET: 849 mL            Clean Catch Clean Catch (Midstream)  08-08 @ 05:19   <10,000 CFU/mL Normal Urogenital Jessica  --  --          MEDICATIONS:    MEDICATIONS  (STANDING):  acetaminophen     Tablet .. 1000 milliGRAM(s) Oral every 8 hours  ascorbic acid 500 milliGRAM(s) Oral daily  buMETAnide 2 milliGRAM(s) Oral daily  cholecalciferol 400 Unit(s) Oral daily  enoxaparin Injectable 40 milliGRAM(s) SubCutaneous every 24 hours  escitalopram 15 milliGRAM(s) Oral daily  levothyroxine 50 MICROGram(s) Oral daily  lidocaine   4% Patch 1 Patch Transdermal daily  multivitamin 1 Tablet(s) Oral daily  polyethylene glycol 3350 17 Gram(s) Oral daily  senna 1 Tablet(s) Oral at bedtime  sodium chloride 0.9%. 1000 milliLiter(s) (85 mL/Hr) IV Continuous <Continuous>  sodium chloride 0.9%. 1000 milliLiter(s) (85 mL/Hr) IV Continuous <Continuous>      MEDICATIONS  (PRN):  benzocaine 15 mG/menthol 3.6 mG Lozenge 1 Lozenge Oral three times a day PRN Mouth Sores  melatonin 3 milliGRAM(s) Oral at bedtime PRN Insomnia  naproxen 375 milliGRAM(s) Oral every 8 hours PRN Moderate Pain (4 - 6)  ondansetron Injectable 4 milliGRAM(s) IV Push every 8 hours PRN Nausea and/or Vomiting      REVIEW OF SYSTEMS:                           ALL ROS DONE [ X   ]    CONSTITUTIONAL:  LETHARGIC [   ], FEVER [   ], UNRESPONSIVE [   ]  CVS:  CP  [   ], SOB, [   ], PALPITATIONS [   ], DIZZYNESS [   ]  RS: COUGH [   ], SPUTUM [   ]  GI: ABDOMINAL PAIN [   ], NAUSEA [   ], VOMITINGS [   ], DIARRHEA [   ], CONSTIPATION [   ]  :  DYSURIA [   ], NOCTURIA [   ], INCREASED FREQUENCY [   ], DRIBLING [   ],  SKELETAL: PAINFUL JOINTS [   ], SWOLLEN JOINTS [   ], NECK ACHE [   ], LOW BACK ACHE [   ],  SKIN : ULCERS [   ], RASH [   ], ITCHING [   ]  CNS: HEAD ACHE [   ], DOUBLE VISION [   ], BLURRED VISION [   ], AMS / CONFUSION [   ], SEIZURES [   ], WEAKNESS [   ],TINGLING / NUMBNESS [   ]      PHYSICAL EXAMINATION:  GENERAL APPEARANCE: NO DISTRESS  HEENT:  NO PALLOR, NO  JVD,  NO   NODES, NECK SUPPLE  CVS: S1 +, S2 +,   RS: AEEB,  OCCASIONAL  RALES +,   NO RONCHI  ABD: SOFT, NT, NO, BS +  EXT: NO PE  SKIN: WARM,   SKELETAL:  ROM ACCEPTABLE  CNS:  AAO X 3    RADIOLOGY :    ACC: [id number] EXAM:  CT BRAIN                          PROCEDURE DATE:  08/08/2022          INTERPRETATION:  NONCONTRAST CT OF THE BRAIN DATED 8/8/2022.    CLINICAL INFORMATION:  head trauma    TECHNIQUE: Axial CT images are obtained from the cranial vertex to the   skull base without the administration of IV contrast. Images are   reformatted in sagittal and coronal planes.    No prior studies are available for comparison.    FINDINGS:    Evaluation through the posterior fossa is degraded by streak artifact   from dental amalgam. There is no gross acute intra-axial or extra-axial   hemorrhage. There is no significant mass effect or shift of the midline.   The basal cisterns are not effaced. There is cerebral and cerebellar   volume loss with prominence of the ventricles, sulci, and cerebellar   folia. There are minimal chronic ischemic changes in the frontoparietal   white matter. There are atherosclerotic calcifications of the   intracranial carotid arteries and intradural vertebral arteries.    There is no significant scalp soft tissue swelling or scalp hematoma. The   skull base and bony calvarium are intact. The visualized paranasal   sinuses and tympanic/mastoid cavities are clear apart from mild bilateral   ethmoid mucosal thickening. There is evidence of bilateral lens surgery.    IMPRESSION:    Evaluation through the posterior fossa degraded by streak artifact from   dental amalgam. No gross acute intracranial hemorrhage, mass effect, or   acute osseous fracture.    =====================    ACC: [id number] EXAM:  CT CERVICAL SPINE                          PROCEDURE DATE:  08/08/2022          INTERPRETATION:  CLINICAL INFORMATION:  head trauma      TECHNIQUE: Thin section axial CT images are obtained from the skullbase   through the thoracic inlet and a study dedicated to evaluate the cervical   spine. Images are reformatted in the sagittal and coronal planes.    No prior studies are available for comparison.    FINDINGS:    There is nonspecific straightening of the cervical spine lordosis. There   is no acute cervical spine fracture or evidence of traumatic   malalignment. There is no significant prevertebral soft tissue   swelling/hematoma. There are multilevel degenerative changes with   multilevel intervertebral disc space narrowing, ventral osteophytes, disc   bulges and disc osteophyte complexes, and facet and uncinate hypertrophy   contributing to mild multilevel segmental canal stenosis and varying   degrees of neural foraminal stenosis. The regional soft tissues of the   neck are otherwise unremarkable. The lung apices are clear.      IMPRESSION:    No acute cervical spine fracture or evidence of traumatic malalignment.   Cervical spondylosis.    ============================    ACC: [id number] EXAM:  CT HIP ONLY LT                          PROCEDURE DATE:  08/08/2022          INTERPRETATION:  CT HIP LEFT dated 8/8/2022 9:03 AM    INDICATION: Left hip pain after trauma    COMPARISON: Pelvic radiograph dated 8/7/2022    TECHNIQUE: CT imaging of the pelvis was performed. The data was   reformatted in the axial, coronal, and sagittal planes. Additionally, 3-D   reformatted imaging was created.    FINDINGS: Mildly degraded by motion artifact.    OSSEOUS STRUCTURES: There is no fracture. There is mild bilateral hip   joint space narrowing. Productive changes of the lower lumbar spine.   Marked narrowing the pubic symphysis with sclerosis and small cystic   change. Subcentimeter bone island the left femoral head.  SYNOVIUM/ JOINT FLUID: No hip joint effusion.  TENDONS: The tendons are intact.  MUSCLES: No muscle hematoma is noted.  NEUROVASCULAR STRUCTURES: Moderate vascular calcifications present.  INTRAPELVIC SOFT TISSUES: Large fat and nonobstructed bowel containing  left abdominal wall hernia.  SUBCUTANEOUS SOFT TISSUES: No soft tissue swelling.    3-D reformatted imaging confirms these findings.    IMPRESSION:    No fracture identified.  Degenerative changes.      ASSESSMENT :     Pain of left hip    Obesity (ICD9 278.00)    systemic edema    Acne Cystica (ICD9 706.1)    CHF (Congestive Heart Failure) (ICD9 428.0)    DENIS (Obstructive Sleep Apnea) (ICD9 327.23)    Endometrial cancer    Sciatica    C Section 1965    cyst removed from the neck 1966    History of Dilatation and Curettage (ICD9 V45.89)    H/O: hysterectomy    S/P partial lobectomy of lung        PLAN:  HPI:  85 y/o female, coming form home, ambulates independently, with PMHx of Uterine Cancer (that has metastasized to the lungs), Sciatica, and neuropathy in the legs presents to the ED after she fell coming out of the bathroom and into her bedroom on Saturday (8/6) at 7:30 am. She said she slipped on the tile and hit her head on the plant stand, but couldn't stand up after. She reports crawling to the nearest phone to call her daughter, as she lives alone with no HHA, patient claims to wait for her daughter to help her get up. Patient states that she was on the floor for an unknown amount of time. Patient is AOx2-3 but an unreliable historian, collateral obtained from daughter, Batsheva. Per daughter, she states that patient living conditions are concerning, as she is not able to care for herself. Daughter is overwhelmed, worries that her mother is not following up with her own health and finances. Patient also states that she has difficulty ambulating, but does not think she can walk after fall. Patient denies feeling light-headed or dizzy prior to falling, and denies LOC post-fall. She said the pain became apparent later that night in the pelvic area and knees and rated the pain a 9/10.  (08 Aug 2022 11:23)    # PT RECOMMENDATION FOR [facility] ([place] -  TEAM TO COORDINATE W/ FAMILY    # S/P MECHANICAL FALL W/ TRAUMA TO POSTERIOR SCALP AND HIP  # AMBULATORY DYSFUNCTION  # HX OF SCIATICA  - NOTED CT HEAD AND HIP  - F/U ORTHOSTATIC VITALS  - NOTED PT EVAL  - PAIN MANAGEMENT CONSULT    # RHABDOMYOLYSIS - IMPROVED  - S/P IVF    # LEUKOCYTOSIS - SUSPECT REACTIVE - RESOLVING  - DENIES DYSURIA, COUGH, RASHES, N/V/C/D, ABDOMINAL PAIN    # ABDOMINAL WALL HERNIA - PER PATIENT CHRONIC, AND HAS PREVIOUSLY BEEN EVALUATED BY SURGICAL     # GI AND DVT PPX.    `Patient is a 86y old  Female who presents with a chief complaint of Fall (11 Aug 2022 10:02)    PATIENT IS SEEN AND EXAMINED IN MEDICAL FLOOR.      ALLERGIES:  codeine (Vomiting; Short breath; Nausea)  No Known Allergies      Daily     Daily     VITALS:    Vital Signs Last 24 Hrs  T(C): 36.5 (11 Aug 2022 05:41), Max: 36.7 (10 Aug 2022 13:44)  T(F): 97.7 (11 Aug 2022 05:41), Max: 98.1 (10 Aug 2022 13:44)  HR: 66 (11 Aug 2022 05:41) (66 - 74)  BP: 157/66 (11 Aug 2022 05:41) (108/47 - 157/66)  BP(mean): --  RR: 18 (11 Aug 2022 05:41) (17 - 18)  SpO2: 94% (11 Aug 2022 05:41) (94% - 99%)    Parameters below as of 11 Aug 2022 05:41  Patient On (Oxygen Delivery Method): room air        LABS:    CBC Full  -  ( 11 Aug 2022 07:55 )  WBC Count : 6.07 K/uL  RBC Count : 3.85 M/uL  Hemoglobin : 11.7 g/dL  Hematocrit : 36.6 %  Platelet Count - Automated : 257 K/uL  Mean Cell Volume : 95.1 fl  Mean Cell Hemoglobin : 30.4 pg  Mean Cell Hemoglobin Concentration : 32.0 gm/dL  Auto Neutrophil # : x  Auto Lymphocyte # : x  Auto Monocyte # : x  Auto Eosinophil # : x  Auto Basophil # : x  Auto Neutrophil % : x  Auto Lymphocyte % : x  Auto Monocyte % : x  Auto Eosinophil % : x  Auto Basophil % : x      08-11    143  |  111<H>  |  14  ----------------------------<  94  3.7   |  24  |  0.64    Ca    8.7      11 Aug 2022 07:55      CAPILLARY BLOOD GLUCOSE        CARDIAC MARKERS ( 11 Aug 2022 07:55 )  x     / x     / 483 U/L / x     / x      CARDIAC MARKERS ( 10 Aug 2022 05:30 )  x     / x     / 621 U/L / x     / x            Creatinine Trend: 0.64<--, 0.60<--, 0.55<--, 0.83<--  I&O's Summary    10 Aug 2022 07:01  -  11 Aug 2022 07:00  --------------------------------------------------------  IN: 850 mL / OUT: 1 mL / NET: 849 mL            Clean Catch Clean Catch (Midstream)  08-08 @ 05:19   <10,000 CFU/mL Normal Urogenital Jessica  --  --          MEDICATIONS:    MEDICATIONS  (STANDING):  acetaminophen     Tablet .. 1000 milliGRAM(s) Oral every 8 hours  ascorbic acid 500 milliGRAM(s) Oral daily  buMETAnide 2 milliGRAM(s) Oral daily  cholecalciferol 400 Unit(s) Oral daily  enoxaparin Injectable 40 milliGRAM(s) SubCutaneous every 24 hours  escitalopram 15 milliGRAM(s) Oral daily  levothyroxine 50 MICROGram(s) Oral daily  lidocaine   4% Patch 1 Patch Transdermal daily  multivitamin 1 Tablet(s) Oral daily  polyethylene glycol 3350 17 Gram(s) Oral daily  senna 1 Tablet(s) Oral at bedtime  sodium chloride 0.9%. 1000 milliLiter(s) (85 mL/Hr) IV Continuous <Continuous>  sodium chloride 0.9%. 1000 milliLiter(s) (85 mL/Hr) IV Continuous <Continuous>      MEDICATIONS  (PRN):  benzocaine 15 mG/menthol 3.6 mG Lozenge 1 Lozenge Oral three times a day PRN Mouth Sores  melatonin 3 milliGRAM(s) Oral at bedtime PRN Insomnia  naproxen 375 milliGRAM(s) Oral every 8 hours PRN Moderate Pain (4 - 6)  ondansetron Injectable 4 milliGRAM(s) IV Push every 8 hours PRN Nausea and/or Vomiting      REVIEW OF SYSTEMS:                           ALL ROS DONE [ X   ]    CONSTITUTIONAL:  LETHARGIC [   ], FEVER [   ], UNRESPONSIVE [   ]  CVS:  CP  [   ], SOB, [   ], PALPITATIONS [   ], DIZZYNESS [   ]  RS: COUGH [   ], SPUTUM [   ]  GI: ABDOMINAL PAIN [   ], NAUSEA [   ], VOMITINGS [   ], DIARRHEA [   ], CONSTIPATION [   ]  :  DYSURIA [   ], NOCTURIA [   ], INCREASED FREQUENCY [   ], DRIBLING [   ],  SKELETAL: PAINFUL JOINTS [   ], SWOLLEN JOINTS [   ], NECK ACHE [   ], LOW BACK ACHE [   ],  SKIN : ULCERS [   ], RASH [   ], ITCHING [   ]  CNS: HEAD ACHE [   ], DOUBLE VISION [   ], BLURRED VISION [   ], AMS / CONFUSION [   ], SEIZURES [   ], WEAKNESS [   ],TINGLING / NUMBNESS [   ]      PHYSICAL EXAMINATION:  GENERAL APPEARANCE: NO DISTRESS  HEENT:  NO PALLOR, NO  JVD,  NO   NODES, NECK SUPPLE  CVS: S1 +, S2 +,   RS: AEEB,  OCCASIONAL  RALES +,   NO RONCHI  ABD: SOFT, NT, NO, BS +  EXT: NO PE  SKIN: WARM,   SKELETAL:  ROM ACCEPTABLE  CNS:  AAO X 3    RADIOLOGY :    ACC: [id number] EXAM:  CT BRAIN                          PROCEDURE DATE:  08/08/2022          INTERPRETATION:  NONCONTRAST CT OF THE BRAIN DATED 8/8/2022.    CLINICAL INFORMATION:  head trauma    TECHNIQUE: Axial CT images are obtained from the cranial vertex to the   skull base without the administration of IV contrast. Images are   reformatted in sagittal and coronal planes.    No prior studies are available for comparison.    FINDINGS:    Evaluation through the posterior fossa is degraded by streak artifact   from dental amalgam. There is no gross acute intra-axial or extra-axial   hemorrhage. There is no significant mass effect or shift of the midline.   The basal cisterns are not effaced. There is cerebral and cerebellar   volume loss with prominence of the ventricles, sulci, and cerebellar   folia. There are minimal chronic ischemic changes in the frontoparietal   white matter. There are atherosclerotic calcifications of the   intracranial carotid arteries and intradural vertebral arteries.    There is no significant scalp soft tissue swelling or scalp hematoma. The   skull base and bony calvarium are intact. The visualized paranasal   sinuses and tympanic/mastoid cavities are clear apart from mild bilateral   ethmoid mucosal thickening. There is evidence of bilateral lens surgery.    IMPRESSION:    Evaluation through the posterior fossa degraded by streak artifact from   dental amalgam. No gross acute intracranial hemorrhage, mass effect, or   acute osseous fracture.    =====================    ACC: [id number] EXAM:  CT CERVICAL SPINE                          PROCEDURE DATE:  08/08/2022          INTERPRETATION:  CLINICAL INFORMATION:  head trauma      TECHNIQUE: Thin section axial CT images are obtained from the skullbase   through the thoracic inlet and a study dedicated to evaluate the cervical   spine. Images are reformatted in the sagittal and coronal planes.    No prior studies are available for comparison.    FINDINGS:    There is nonspecific straightening of the cervical spine lordosis. There   is no acute cervical spine fracture or evidence of traumatic   malalignment. There is no significant prevertebral soft tissue   swelling/hematoma. There are multilevel degenerative changes with   multilevel intervertebral disc space narrowing, ventral osteophytes, disc   bulges and disc osteophyte complexes, and facet and uncinate hypertrophy   contributing to mild multilevel segmental canal stenosis and varying   degrees of neural foraminal stenosis. The regional soft tissues of the   neck are otherwise unremarkable. The lung apices are clear.      IMPRESSION:    No acute cervical spine fracture or evidence of traumatic malalignment.   Cervical spondylosis.    ============================    ACC: [id number] EXAM:  CT HIP ONLY LT                          PROCEDURE DATE:  08/08/2022          INTERPRETATION:  CT HIP LEFT dated 8/8/2022 9:03 AM    INDICATION: Left hip pain after trauma    COMPARISON: Pelvic radiograph dated 8/7/2022    TECHNIQUE: CT imaging of the pelvis was performed. The data was   reformatted in the axial, coronal, and sagittal planes. Additionally, 3-D   reformatted imaging was created.    FINDINGS: Mildly degraded by motion artifact.    OSSEOUS STRUCTURES: There is no fracture. There is mild bilateral hip   joint space narrowing. Productive changes of the lower lumbar spine.   Marked narrowing the pubic symphysis with sclerosis and small cystic   change. Subcentimeter bone island the left femoral head.  SYNOVIUM/ JOINT FLUID: No hip joint effusion.  TENDONS: The tendons are intact.  MUSCLES: No muscle hematoma is noted.  NEUROVASCULAR STRUCTURES: Moderate vascular calcifications present.  INTRAPELVIC SOFT TISSUES: Large fat and nonobstructed bowel containing  left abdominal wall hernia.  SUBCUTANEOUS SOFT TISSUES: No soft tissue swelling.    3-D reformatted imaging confirms these findings.    IMPRESSION:    No fracture identified.  Degenerative changes.      ASSESSMENT :     Pain of left hip    Obesity (ICD9 278.00)    systemic edema    Acne Cystica (ICD9 706.1)    CHF (Congestive Heart Failure) (ICD9 428.0)    DENIS (Obstructive Sleep Apnea) (ICD9 327.23)    Endometrial cancer    Sciatica    C Section 1965    cyst removed from the neck 1966    History of Dilatation and Curettage (ICD9 V45.89)    H/O: hysterectomy    S/P partial lobectomy of lung        PLAN:  HPI:  87 y/o female, coming form home, ambulates independently, with PMHx of Uterine Cancer (that has metastasized to the lungs), Sciatica, and neuropathy in the legs presents to the ED after she fell coming out of the bathroom and into her bedroom on Saturday (8/6) at 7:30 am. She said she slipped on the tile and hit her head on the plant stand, but couldn't stand up after. She reports crawling to the nearest phone to call her daughter, as she lives alone with no HHA, patient claims to wait for her daughter to help her get up. Patient states that she was on the floor for an unknown amount of time. Patient is AOx2-3 but an unreliable historian, collateral obtained from daughter, Batsheva. Per daughter, she states that patient living conditions are concerning, as she is not able to care for herself. Daughter is overwhelmed, worries that her mother is not following up with her own health and finances. Patient also states that she has difficulty ambulating, but does not think she can walk after fall. Patient denies feeling light-headed or dizzy prior to falling, and denies LOC post-fall. She said the pain became apparent later that night in the pelvic area and knees and rated the pain a 9/10.  (08 Aug 2022 11:23)    # D/C IN A.M. IF MEDICALLY STABLE - PATIENT HAS BEEN ACCEPTED [facility]   # PT RECOMMENDATION FOR [facility] - CM TEAM TO COORDINATE W/ FAMILY    # S/P MECHANICAL FALL W/ TRAUMA TO POSTERIOR SCALP AND HIP  # AMBULATORY DYSFUNCTION  # HX OF SCIATICA  - NOTED CT HEAD AND HIP  - F/U ORTHOSTATIC VITALS  - NOTED PT EVAL  - PAIN MANAGEMENT CONSULT    # RHABDOMYOLYSIS - IMPROVED  - S/P IVF    # LEUKOCYTOSIS - SUSPECT REACTIVE - RESOLVING  - DENIES DYSURIA, COUGH, RASHES, N/V/C/D, ABDOMINAL PAIN    # ABDOMINAL WALL HERNIA - PER PATIENT CHRONIC, AND HAS PREVIOUSLY BEEN EVALUATED BY SURGICAL     # GI AND DVT PPX.

## 2022-08-11 NOTE — PROGRESS NOTE ADULT - PROBLEM SELECTOR PLAN 1
s/p fall, no LOC  On admission CK=1,882.  Repeat XR=445  s/p IV fluids   CK level in the AM-f/u results  PT rec [facility]- sean

## 2022-08-11 NOTE — PROGRESS NOTE ADULT - PROBLEM SELECTOR PLAN 4
h/o endometrial CA with mets , pt states she is in remission after metastasis to the lungs  follow w/ o/p oncologist
h/o endometrial CA with mets , pt states she is in remission after metastasis to the lungs  follow w/ o/p oncologist
s/p mechanical fall with unknown down time   imaging negative for fractures   Fall precautions  PT recommended [facility]  CM following.  8/10 choices obtained

## 2022-08-11 NOTE — PROGRESS NOTE ADULT - PROBLEM SELECTOR PLAN 3
s/p mechanical fall with unknown down time   imaging negative for fractures   Fall precautions  PT recommended [facility]
Supplemented today  BMP in AM-f/u results
s/p mechanical fall with unknown down time   imaging negative for fractures   Fall precautions  PT recommended [facility]  CM following.  8/10 choices obtained

## 2022-08-11 NOTE — PROGRESS NOTE ADULT - PROBLEM SELECTOR PLAN 5
C/w Lovenox dvt ppx  C/w Protonix for GI ppx
C/w Lovenox for GI ppx  C/w Protonix for GI ppx
h/o endometrial CA with mets , pt states she is in remission after metastasis to the lungs  follow w/ o/p oncologist

## 2022-08-11 NOTE — PROGRESS NOTE ADULT - PROBLEM SELECTOR PLAN 2
s/p fall associated with SOB, but no LOC, headache, dizziness, chest pain  likely mechanical, could be due to ambulatory dysfunction, sciatica, knee weakness and pain   CT head Non-contrast :  no acute hge or stroke   CT hip shows no fractures   Fall Precaution   PT recommended [facility]  CM following
s/p fall associated with SOB, but no LOC, headache, dizziness, chest pain  likely mechanical, could be due to ambulatory dysfunction, sciatica, knee weakness and pain   CT head Non-contrast :  no acute hge or stroke   CT hip shows no fractures   Fall Precaution   PT recommended [facility]  CM following.  8/10 choices obtained   SW consult requested-f/u recommendation
likely mechanical, could be due to ambulatory dysfunction, sciatica, knee weakness and pain   CT head Non-contrast :  no acute hge or stroke   CT hip shows no fractures   Fall Precaution   PT recommended [facility]  CM following.    SW following

## 2022-08-12 ENCOUNTER — TRANSCRIPTION ENCOUNTER (OUTPATIENT)
Age: 87
End: 2022-08-12

## 2022-08-12 VITALS — DIASTOLIC BLOOD PRESSURE: 67 MMHG | SYSTOLIC BLOOD PRESSURE: 135 MMHG

## 2022-08-12 LAB
CK SERPL-CCNC: 227 U/L — HIGH (ref 21–215)
SARS-COV-2 RNA SPEC QL NAA+PROBE: SIGNIFICANT CHANGE UP

## 2022-08-12 PROCEDURE — 70450 CT HEAD/BRAIN W/O DYE: CPT | Mod: MA

## 2022-08-12 PROCEDURE — 82550 ASSAY OF CK (CPK): CPT

## 2022-08-12 PROCEDURE — 72125 CT NECK SPINE W/O DYE: CPT | Mod: MA

## 2022-08-12 PROCEDURE — 36415 COLL VENOUS BLD VENIPUNCTURE: CPT

## 2022-08-12 PROCEDURE — 85027 COMPLETE CBC AUTOMATED: CPT

## 2022-08-12 PROCEDURE — 87086 URINE CULTURE/COLONY COUNT: CPT

## 2022-08-12 PROCEDURE — 72170 X-RAY EXAM OF PELVIS: CPT

## 2022-08-12 PROCEDURE — 80061 LIPID PANEL: CPT

## 2022-08-12 PROCEDURE — 81001 URINALYSIS AUTO W/SCOPE: CPT

## 2022-08-12 PROCEDURE — 85025 COMPLETE CBC W/AUTO DIFF WBC: CPT

## 2022-08-12 PROCEDURE — 99285 EMERGENCY DEPT VISIT HI MDM: CPT

## 2022-08-12 PROCEDURE — 80048 BASIC METABOLIC PNL TOTAL CA: CPT

## 2022-08-12 PROCEDURE — 73700 CT LOWER EXTREMITY W/O DYE: CPT | Mod: MA

## 2022-08-12 PROCEDURE — 99232 SBSQ HOSP IP/OBS MODERATE 35: CPT

## 2022-08-12 PROCEDURE — 80053 COMPREHEN METABOLIC PANEL: CPT

## 2022-08-12 PROCEDURE — 87635 SARS-COV-2 COVID-19 AMP PRB: CPT

## 2022-08-12 RX ORDER — ASCORBIC ACID 60 MG
1 TABLET,CHEWABLE ORAL
Qty: 0 | Refills: 0 | DISCHARGE
Start: 2022-08-12

## 2022-08-12 RX ORDER — CHOLECALCIFEROL (VITAMIN D3) 125 MCG
400 CAPSULE ORAL
Qty: 0 | Refills: 0 | DISCHARGE
Start: 2022-08-12

## 2022-08-12 RX ORDER — ESCITALOPRAM OXALATE 10 MG/1
3 TABLET, FILM COATED ORAL
Qty: 0 | Refills: 0 | DISCHARGE

## 2022-08-12 RX ORDER — ANASTROZOLE 1 MG/1
1 TABLET ORAL
Qty: 0 | Refills: 0 | DISCHARGE

## 2022-08-12 RX ORDER — POLYETHYLENE GLYCOL 3350 17 G/17G
17 POWDER, FOR SOLUTION ORAL
Qty: 0 | Refills: 0 | DISCHARGE
Start: 2022-08-12

## 2022-08-12 RX ORDER — ANASTROZOLE 1 MG/1
1 TABLET ORAL
Qty: 0 | Refills: 0 | DISCHARGE
Start: 2022-08-12

## 2022-08-12 RX ORDER — LEVOTHYROXINE SODIUM 125 MCG
1 TABLET ORAL
Qty: 0 | Refills: 0 | DISCHARGE

## 2022-08-12 RX ORDER — BUMETANIDE 0.25 MG/ML
1 INJECTION INTRAMUSCULAR; INTRAVENOUS
Qty: 0 | Refills: 0 | DISCHARGE
Start: 2022-08-12

## 2022-08-12 RX ORDER — ESCITALOPRAM OXALATE 10 MG/1
1 TABLET, FILM COATED ORAL
Qty: 0 | Refills: 0 | DISCHARGE

## 2022-08-12 RX ORDER — LIDOCAINE 4 G/100G
0 CREAM TOPICAL
Qty: 0 | Refills: 0 | DISCHARGE
Start: 2022-08-12

## 2022-08-12 RX ORDER — LEVOTHYROXINE SODIUM 125 MCG
1 TABLET ORAL
Qty: 0 | Refills: 0 | DISCHARGE
Start: 2022-08-12

## 2022-08-12 RX ORDER — SENNA PLUS 8.6 MG/1
1 TABLET ORAL
Qty: 0 | Refills: 0 | DISCHARGE
Start: 2022-08-12

## 2022-08-12 RX ORDER — ESCITALOPRAM OXALATE 10 MG/1
3 TABLET, FILM COATED ORAL
Qty: 0 | Refills: 0 | DISCHARGE
Start: 2022-08-12

## 2022-08-12 RX ORDER — SODIUM CHLORIDE 9 MG/ML
500 INJECTION INTRAMUSCULAR; INTRAVENOUS; SUBCUTANEOUS ONCE
Refills: 0 | Status: COMPLETED | OUTPATIENT
Start: 2022-08-12 | End: 2022-08-12

## 2022-08-12 RX ORDER — BUMETANIDE 0.25 MG/ML
1 INJECTION INTRAMUSCULAR; INTRAVENOUS
Qty: 0 | Refills: 0 | DISCHARGE

## 2022-08-12 RX ADMIN — LIDOCAINE 1 PATCH: 4 CREAM TOPICAL at 11:53

## 2022-08-12 RX ADMIN — ENOXAPARIN SODIUM 40 MILLIGRAM(S): 100 INJECTION SUBCUTANEOUS at 05:31

## 2022-08-12 RX ADMIN — Medication 400 UNIT(S): at 11:46

## 2022-08-12 RX ADMIN — ANASTROZOLE 1 MILLIGRAM(S): 1 TABLET ORAL at 11:53

## 2022-08-12 RX ADMIN — Medication 1000 MILLIGRAM(S): at 05:30

## 2022-08-12 RX ADMIN — ESCITALOPRAM OXALATE 15 MILLIGRAM(S): 10 TABLET, FILM COATED ORAL at 11:47

## 2022-08-12 RX ADMIN — Medication 50 MICROGRAM(S): at 05:30

## 2022-08-12 RX ADMIN — Medication 1000 MILLIGRAM(S): at 06:23

## 2022-08-12 RX ADMIN — Medication 500 MILLIGRAM(S): at 11:46

## 2022-08-12 RX ADMIN — Medication 1 TABLET(S): at 11:45

## 2022-08-12 RX ADMIN — BUMETANIDE 2 MILLIGRAM(S): 0.25 INJECTION INTRAMUSCULAR; INTRAVENOUS at 05:30

## 2022-08-12 NOTE — PROGRESS NOTE ADULT - PROBLEM SELECTOR PROBLEM 3
Chronic back pain
Chronic back pain
Hypokalemia
Ambulatory dysfunction
Ambulatory dysfunction
Chronic back pain
Chronic back pain

## 2022-08-12 NOTE — PROGRESS NOTE ADULT - SUBJECTIVE AND OBJECTIVE BOX
Source of information: CHAYO HERNANDEZMARIA ALEJANDRA, Chart review  Patient language: English  : n/a    HPI:  An 87 y/o female presents to the ED after she fell coming out of the bathroom and into her bedroom on Saturday (8/6) at 7:30 am. She said she slipped on the tile and hit her head on the plant stand, but couldn't stand up after. She denies feeling light-headed or dizzy prior to falling, and denies LOC post-fall. She said the pain became apparent later that night in the pelvic area and knees and rated the pain a 9/10. She says she is unable to stand or walk now.  (08 Aug 2022 11:23)      Pt is admitted for fall. CT hip negative fx. + degenerative changes. Pain consulted for left hip pain. Reports left hip pain started after her fall on Saturday, states she slipped at home on her floor which had a small amount of water and hit her head and left side on the floor. + rhabdo CK decreased from 1882 (8/8) -> 678 (8/9)-> 621 (8/8) -> 483 (8/9) -> 227 (8/12).     Pt seen and examined at bedside. Pt sitting in chair, reports her pain has improved with current pain regimen. Denies pain at this time. Reports when pain occurs, her pain radiates from left hip to left groin, alleviated by rest and pain medications, exacerbated by movement. Pt also reports hx chronic low back pain with b/l sciatica for which she take PRN tylenol and motrin at home. + chronic b/l LE numbness. Pt tolerating PO diet. Denies lethargy, chest pain, SOB, nausea, vomiting, constipation. Reports last BM 8/11. Patient stated goal for pain control: to be able to take deep breaths, get out of bed to chair and ambulate with tolerable pain control. Pt out of bed to chair. Seen by PT 8/9, recommend SARINA. Plan for discharge today.     PAST MEDICAL & SURGICAL HISTORY:  Obesity (ICD9 278.00)      systemic edema      Acne Cystica (ICD9 706.1)      CHF (Congestive Heart Failure) (ICD9 428.0)      DENIS (Obstructive Sleep Apnea) (ICD9 327.23)      Endometrial cancer      Sciatica      C Section 1965      cyst removed from the neck 1966      History of Dilatation and Curettage (ICD9 V45.89)      H/O: hysterectomy      S/P partial lobectomy of lung          FAMILY HISTORY:      Social History:  Lives alone; No aid; Uses a cane to walk outside the house (08 Aug 2022 11:23)   [X ] Denies ETOH use, illicit drug use and smoking    Allergies    No Known Allergies    Intolerances    codeine (Vomiting; Short breath; Nausea)    MEDICATIONS  (STANDING):  acetaminophen     Tablet .. 1000 milliGRAM(s) Oral every 8 hours  anastrozole 1 milliGRAM(s) Oral daily  ascorbic acid 500 milliGRAM(s) Oral daily  buMETAnide 2 milliGRAM(s) Oral daily  cholecalciferol 400 Unit(s) Oral daily  enoxaparin Injectable 40 milliGRAM(s) SubCutaneous every 24 hours  escitalopram 15 milliGRAM(s) Oral daily  levothyroxine 50 MICROGram(s) Oral daily  lidocaine   4% Patch 1 Patch Transdermal daily  multivitamin 1 Tablet(s) Oral daily  polyethylene glycol 3350 17 Gram(s) Oral daily  senna 1 Tablet(s) Oral at bedtime  sodium chloride 0.9% Bolus 500 milliLiter(s) IV Bolus once  sodium chloride 0.9%. 1000 milliLiter(s) (85 mL/Hr) IV Continuous <Continuous>  sodium chloride 0.9%. 1000 milliLiter(s) (85 mL/Hr) IV Continuous <Continuous>    MEDICATIONS  (PRN):  benzocaine 15 mG/menthol 3.6 mG Lozenge 1 Lozenge Oral three times a day PRN Mouth Sores  melatonin 3 milliGRAM(s) Oral at bedtime PRN Insomnia  naproxen 375 milliGRAM(s) Oral every 8 hours PRN Moderate Pain (4 - 6)  ondansetron Injectable 4 milliGRAM(s) IV Push every 8 hours PRN Nausea and/or Vomiting      Vital Signs Last 24 Hrs  T(C): 36.3 (12 Aug 2022 05:42), Max: 36.7 (11 Aug 2022 13:21)  T(F): 97.4 (12 Aug 2022 05:42), Max: 98 (11 Aug 2022 13:21)  HR: 80 (12 Aug 2022 05:42) (64 - 80)  BP: 135/67 (12 Aug 2022 06:18) (122/61 - 184/71)  BP(mean): --  RR: 18 (12 Aug 2022 05:42) (18 - 20)  SpO2: 96% (12 Aug 2022 05:42) (96% - 98%)    Parameters below as of 12 Aug 2022 05:42  Patient On (Oxygen Delivery Method): room air      COVID-19 PCR: NotDetec (12 Aug 2022 10:37)  COVID-19 PCR: NotDetec (08 Aug 2022 05:19)    LABS: Reviewed                          11.7   6.07  )-----------( 257      ( 11 Aug 2022 07:55 )             36.6     08-11    143  |  111<H>  |  14  ----------------------------<  94  3.7   |  24  |  0.64    Ca    8.7      11 Aug 2022 07:55    Radiology: Reviewed.   < from: CT Head No Cont (08.08.22 @ 02:11) >    ACC: [id number] EXAM:  CT BRAIN                          PROCEDURE DATE:  08/08/2022          INTERPRETATION:  NONCONTRAST CT OF THE BRAIN DATED 8/8/2022.    CLINICAL INFORMATION:  head trauma    TECHNIQUE: Axial CT images are obtained from the cranial vertex to the   skull base without the administration of IV contrast. Images are   reformatted in sagittal and coronal planes.    No prior studies are available for comparison.    FINDINGS:    Evaluation through the posterior fossa is degraded by streak artifact   from dental amalgam. There is no gross acute intra-axial or extra-axial   hemorrhage. There is no significant mass effect or shift of the midline.   The basal cisterns are not effaced. There is cerebral and cerebellar   volume loss with prominence of the ventricles, sulci, and cerebellar   folia. There are minimal chronic ischemic changes in the frontoparietal   white matter. There are atherosclerotic calcifications of the   intracranial carotid arteries and intradural vertebral arteries.    There is no significant scalp soft tissue swelling or scalp hematoma. The   skull base and bony calvarium are intact. The visualized paranasal   sinuses and tympanic/mastoid cavities are clear apart from mild bilateral   ethmoid mucosal thickening. There is evidence of bilateral lens surgery.    IMPRESSION:    Evaluation through the posterior fossa degraded by streak artifact from   dental amalgam. No gross acute intracranial hemorrhage, mass effect, or   acute osseous fracture.    ---End of Report ---            DOROHTY LÓPEZ DO; Attending Radiologist  This document has been electronically signed. Aug  8 2022  2:51AM    < end of copied text >    < from: CT Hip No Cont, Left (08.08.22 @ 09:03) >    ACC: [id number] EXAM:  CT HIP ONLY LT                          PROCEDURE DATE:  08/08/2022          INTERPRETATION:  CT HIP LEFT dated 8/8/2022 9:03 AM    INDICATION: Left hip pain after trauma    COMPARISON: Pelvic radiograph dated 8/7/2022    TECHNIQUE: CT imaging of the pelvis was performed. The data was   reformatted in the axial, coronal, and sagittal planes. Additionally, 3-D   reformatted imaging was created.    FINDINGS: Mildly degraded by motion artifact.    OSSEOUS STRUCTURES: There is no fracture. There is mild bilateral hip   joint space narrowing. Productive changes of the lower lumbar spine.   Marked narrowing the pubic symphysis with sclerosis and small cystic   change. Subcentimeter bone island the left femoral head.  SYNOVIUM/ JOINT FLUID: No hip joint effusion.  TENDONS: The tendons are intact.  MUSCLES: No muscle hematoma is noted.  NEUROVASCULAR STRUCTURES: Moderate vascular calcifications present.  INTRAPELVIC SOFT TISSUES: Large fat and nonobstructed bowel containing  left abdominal wall hernia.  SUBCUTANEOUS SOFT TISSUES: No soft tissue swelling.    3-D reformatted imaging confirms these findings.    IMPRESSION:    No fracture identified.  Degenerative changes.    --- End of Report ---            LILIA MERAZ MD; Attending Radiologist  This document has been electronically signed. Aug  8 2022  9:35AM    < end of copied text >      ORT Score -   Family Hx of substance abuse	Female	      Male  Alcohol 	                                           1                     3  Illegal drugs	                                   2                     3  Rx drugs                                           4 	                  4  Personal Hx of substance abuse		  Alcohol 	                                          3	                  3  Illegal drugs                                     4	                  4  Rx drugs                                            5 	                  5  Age between 16- 45 years	           1                     1  hx preadolescent sexual abuse	   3 	                  0  Psychological disease		  ADD, OCD, bipolar, schizophrenia   2	          2  Depression                                           1 	          1  Total: 0    a score of 3 or lower indicates low risk for opioid abuse		  a score of 4-7 indicates moderate risk for opioid abuse		  a score of 8 or higher indicates high risk for opioid abuse    REVIEW OF SYSTEMS:  CONSTITUTIONAL: No fever or fatigue  HEENT:  No difficulty hearing, no change in vision  NECK: No pain or stiffness  RESPIRATORY: No cough, wheezing, chills or hemoptysis; No shortness of breath  CARDIOVASCULAR: No chest pain, palpitations, dizziness, or leg swelling  GASTROINTESTINAL: No loss of appetite, decreased PO intake. No abdominal or epigastric pain. No nausea, vomiting; No diarrhea or constipation. + abdominal hernia   GENITOURINARY: No dysuria, frequency, hematuria, retention or incontinence  MUSCULOSKELETAL: + left hip and groin pain. + chronic back pain with b/l sciatica. + leg swelling; + decreased ROM b/l upper extremities and left lower extremities; no upper or lower motor strength weakness, no saddle anesthesia, bowel/bladder incontinence, + falls   NEURO: No headaches, + chronic numbness/tingling b/l LE, No weakness  PSYCH: hx anxiety    PHYSICAL EXAM:  GENERAL:  Alert & Oriented X4, cooperative, NAD, Good concentration. Speech is clear.   RESPIRATORY: Respirations even and unlabored. Clear to auscultation bilaterally; No rales, rhonchi, wheezing, or rubs  CARDIOVASCULAR: Normal S1/S2, regular rate and rhythm; No murmurs, rubs, or gallops. No JVD.   GASTROINTESTINAL: + obese per BMI, hernia, Soft, Nontender, Nondistended; Bowel sounds present  PERIPHERAL VASCULAR:  Extremities warm with moderate b/l leg edema. 2+ Peripheral Pulses, No cyanosis, No calf tenderness  MUSCULOSKELETAL: Motor Strength 5/5 B/L upper and 3/5 lower extremities; + decreased ROM left LE and b/l upper extremities; negative SLR; + left hip tenderness on palpation.    SKIN: Warm, dry, intact. No rashes, lesions, scars or wounds.     Risk factors associated with adverse outcomes related to opioid treatment  [ ]  Concurrent benzodiazepine use  [ ]  History/ Active substance use or alcohol use disorder  [X ] Psychiatric co-morbidity  [X ] Sleep apnea  [ ] COPD  [X ] BMI> 35  [ ] Liver dysfunction  [ ] Renal dysfunction  [X ] CHF  [ ] Smoker  [X ]  Age > 60 years    [X ]  [place]  Reviewed and Copied to Chart. See below.    Plan of care and goal oriented pain management treatment options were discussed with patient and /or primary care giver; all questions and concerns were addressed and care was aligned with patient's wishes.    Educated patient on goal oriented pain management treatment options     08-12-22 @ 11:33

## 2022-08-12 NOTE — PROGRESS NOTE ADULT - PROBLEM SELECTOR PROBLEM 5
Endometrial cancer
Endometrial cancer
Prophylactic measure
Endometrial cancer
Prophylactic measure

## 2022-08-12 NOTE — PROGRESS NOTE ADULT - ASSESSMENT
Confidential Drug Utilization Report  Search Terms: Grace Adam, 1935Search Date: 08/08/2022 12:00:37 PM  The Drug Utilization Report below displays all of the controlled substance prescriptions, if any, that your patient has filled in the last twelve months. The information displayed on this report is compiled from pharmacy submissions to the Department, and accurately reflects the information as submitted by the pharmacies.    This report was requested by: Fransisca Lynn | Reference #: [id number]    There are no results for the search terms that you entered.
86 year old female, from home lives alone, ambulates independently, with PMHx of Uterine Cancer (that has metastasized to the lungs), Sciatica, and neuropathy in the legs.  Presented to the ED after she fell coming out of the bathroom and into her bedroom on Saturday (8/6) at 7:30 am. She said she slipped on the tile and hit her head on the plant stand, but couldn't stand up after. She reported crawling to the nearest phone to call her daughter.  Patient stated she was on the floor for an unknown amount of time.   Admitted for Rhabdomyolysis. treated with IVF, CK improved. PT recc SARINA, pt accepted to [facility]. DISPO planning for AM.   
Confidential Drug Utilization Report  Search Terms: Grace Adam, 1935Search Date: 08/08/2022 12:00:37 PM  The Drug Utilization Report below displays all of the controlled substance prescriptions, if any, that your patient has filled in the last twelve months. The information displayed on this report is compiled from pharmacy submissions to the Department, and accurately reflects the information as submitted by the pharmacies.    This report was requested by: Fransisca Lynn | Reference #: [id number]    There are no results for the search terms that you entered.
86 year old female, from home lives alone, ambulates independently, with PMHx of Uterine Cancer (that has metastasized to the lungs), Sciatica, and neuropathy in the legs.  Presented to the ED after she fell coming out of the bathroom and into her bedroom on Saturday (8/6) at 7:30 am. She said she slipped on the tile and hit her head on the plant stand, but couldn't stand up after. She reported crawling to the nearest phone to call her daughter.  Patient stated she was on the floor for an unknown amount of time.   Admitted for Rhabdomyolysis.    
86 year old female, from home lives alone, ambulates independently, with PMHx of Uterine Cancer (that has metastasized to the lungs), Sciatica, and neuropathy in the legs.  Presented to the ED after she fell coming out of the bathroom and into her bedroom on Saturday (8/6) at 7:30 am. She said she slipped on the tile and hit her head on the plant stand, but couldn't stand up after. She reported crawling to the nearest phone to call her daughter.  Patient stated she was on the floor for an unknown amount of time.   Admitted for Rhabdomyolysis.    
Confidential Drug Utilization Report  Search Terms: Grace Adam, 1935Search Date: 08/08/2022 12:00:37 PM  The Drug Utilization Report below displays all of the controlled substance prescriptions, if any, that your patient has filled in the last twelve months. The information displayed on this report is compiled from pharmacy submissions to the Department, and accurately reflects the information as submitted by the pharmacies.    This report was requested by: Fransisca Lynn | Reference #: [id number]    There are no results for the search terms that you entered.
Confidential Drug Utilization Report  Search Terms: Grace Adam, 1935Search Date: 08/08/2022 12:00:37 PM  The Drug Utilization Report below displays all of the controlled substance prescriptions, if any, that your patient has filled in the last twelve months. The information displayed on this report is compiled from pharmacy submissions to the Department, and accurately reflects the information as submitted by the pharmacies.    This report was requested by: Fransisca Lynn | Reference #: [id number]    There are no results for the search terms that you entered.

## 2022-08-12 NOTE — PROGRESS NOTE ADULT - PROBLEM SELECTOR PROBLEM 1
Acute hip pain, left
Rhabdomyolysis
Acute hip pain, left

## 2022-08-12 NOTE — DISCHARGE NOTE NURSING/CASE MANAGEMENT/SOCIAL WORK - PATIENT PORTAL LINK FT
You can access the FollowMyHealth Patient Portal offered by [facility] by registering at the following website: [email or web address]. By joining [facility]’s FollowMyHealth portal, you will also be able to view your health information using other applications (apps) compatible with our system.

## 2022-08-12 NOTE — PROGRESS NOTE ADULT - PROVIDER SPECIALTY LIST ADULT
Internal Medicine
Pain Medicine
Internal Medicine
Pain Medicine
Pain Medicine
Internal Medicine
Pain Medicine

## 2022-08-12 NOTE — PROGRESS NOTE ADULT - PROBLEM SELECTOR PLAN 1
Pt with acute left hip pain which is somatic in nature due to fall. + rhabdo CK [id number] (8/8) -> 678 (8/9)-> 621 (8/8) -> 483 (8/90 -> 227 (8/12) . CT hip negative fx. + degenerative changes. + hx chronic low back pain with b/l sciatica and neuropathy. + hx anxiety, restarted home lexapro 15mg PO daily 8/9, verified by pharmacy.   High risk medications reviewed. Avoid polypharmacy. Avoid IV opioids. Avoid benzodiazepines. Non-pharmacological sleep aides initiated. Non-opioid medications and non-pharmacological pain management measures initiated.   Pt with allergy to codeine- SOB.   Maximize non-opioid pain recommendations   - Continue Acetaminophen 1 gram PO q 8 hours x 3 days  - Continue Naproxen 375mg PO q8h PRN moderate pain   - Pt refused gabapentin   - Continue Lidoderm 4% patch daily.   Bowel Regimen  - Continue Miralax and senna as per primary team  Mild pain   - Non-pharmacological pain treatment recommendations  - Warm/ Cool packs PRN   - Repositioning extremity, imagery, relaxation, distraction.  - Physical therapy OOB if no contraindications   Recommendations discussed with primary team and RN.

## 2022-11-16 NOTE — PROGRESS NOTE ADULT - PROBLEM SELECTOR PROBLEM 4
Endometrial cancer
Back pain with sciatica
Endometrial cancer
Back pain with sciatica
Ambulatory dysfunction
No

## 2023-02-13 PROBLEM — M54.30 SCIATICA, UNSPECIFIED SIDE: Chronic | Status: ACTIVE | Noted: 2022-08-08

## 2023-02-13 PROBLEM — C54.1 MALIGNANT NEOPLASM OF ENDOMETRIUM: Chronic | Status: ACTIVE | Noted: 2022-08-08

## 2023-02-13 PROBLEM — F41.9 ANXIETY DISORDER, UNSPECIFIED: Chronic | Status: ACTIVE | Noted: 2022-08-09

## 2023-03-08 ENCOUNTER — OUTPATIENT (OUTPATIENT)
Dept: OUTPATIENT SERVICES | Facility: HOSPITAL | Age: 88
LOS: 1 days | Discharge: HOME | End: 2023-03-08

## 2023-03-08 DIAGNOSIS — C54.1 MALIGNANT NEOPLASM OF ENDOMETRIUM: ICD-10-CM

## 2023-03-08 DIAGNOSIS — Z90.2 ACQUIRED ABSENCE OF LUNG [PART OF]: Chronic | ICD-10-CM

## 2023-03-08 DIAGNOSIS — Z90.710 ACQUIRED ABSENCE OF BOTH CERVIX AND UTERUS: Chronic | ICD-10-CM

## 2023-03-10 ENCOUNTER — APPOINTMENT (OUTPATIENT)
Dept: HEMATOLOGY ONCOLOGY | Facility: CLINIC | Age: 88
End: 2023-03-10
Payer: MEDICARE

## 2023-03-10 ENCOUNTER — APPOINTMENT (OUTPATIENT)
Dept: HEMATOLOGY ONCOLOGY | Facility: CLINIC | Age: 88
End: 2023-03-10

## 2023-03-10 DIAGNOSIS — C78.02 SECONDARY MALIGNANT NEOPLASM OF LEFT LUNG: ICD-10-CM

## 2023-03-10 PROCEDURE — 99442: CPT

## 2023-03-13 NOTE — HISTORY OF PRESENT ILLNESS
[Disease: _____________________] : Disease: [unfilled] [AJCC Stage: ____] : AJCC Stage: [unfilled] [de-identified] : 80y.o female with h/o EMCA diagnosed in 2009, s/p surgical staging followed by Carboplatin and Taxol and vaginal brachytherapy on .\par Patient was going for q6 month surveillance. In March,2016 patient developed cough, w/u showed single JENNIFER 4 cm mass. 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 C/o fatigue, denies any weight loss, bleed or loss of appetite. [de-identified] : She fell and injured her back. She went to ED and then transferred to rehab. She had a stent placed in celiac vein. She has two more veins that needed to be stented.\par She has some constipation..\par

## 2023-03-13 NOTE — REASON FOR VISIT
[Home] : at home, [unfilled] , at the time of the visit. [Medical Office: ([facility])___] : at the medical office located in  [Verbal consent obtained from patient] : the patient, [unfilled] [Follow-Up Visit] : a follow-up [FreeTextEntry2] : relapsed EMCA

## 2023-04-03 ENCOUNTER — RESULT REVIEW (OUTPATIENT)
Age: 88
End: 2023-04-03

## 2023-04-06 ENCOUNTER — APPOINTMENT (OUTPATIENT)
Dept: CT IMAGING | Facility: IMAGING CENTER | Age: 88
End: 2023-04-06

## 2023-04-17 ENCOUNTER — APPOINTMENT (OUTPATIENT)
Dept: CT IMAGING | Facility: IMAGING CENTER | Age: 88
End: 2023-04-17

## 2023-04-25 ENCOUNTER — APPOINTMENT (OUTPATIENT)
Dept: CT IMAGING | Facility: IMAGING CENTER | Age: 88
End: 2023-04-25

## 2023-05-17 ENCOUNTER — APPOINTMENT (OUTPATIENT)
Dept: CT IMAGING | Facility: IMAGING CENTER | Age: 88
End: 2023-05-17
Payer: MEDICARE

## 2023-05-17 ENCOUNTER — OUTPATIENT (OUTPATIENT)
Dept: OUTPATIENT SERVICES | Facility: HOSPITAL | Age: 88
LOS: 1 days | End: 2023-05-17
Payer: MEDICARE

## 2023-05-17 DIAGNOSIS — Z90.710 ACQUIRED ABSENCE OF BOTH CERVIX AND UTERUS: Chronic | ICD-10-CM

## 2023-05-17 DIAGNOSIS — C54.1 MALIGNANT NEOPLASM OF ENDOMETRIUM: ICD-10-CM

## 2023-05-17 DIAGNOSIS — Z90.2 ACQUIRED ABSENCE OF LUNG [PART OF]: Chronic | ICD-10-CM

## 2023-05-17 PROCEDURE — 74177 CT ABD & PELVIS W/CONTRAST: CPT

## 2023-05-17 PROCEDURE — 74177 CT ABD & PELVIS W/CONTRAST: CPT | Mod: 26

## 2023-05-17 PROCEDURE — 71260 CT THORAX DX C+: CPT | Mod: 26

## 2023-05-17 PROCEDURE — 71260 CT THORAX DX C+: CPT

## 2023-05-17 NOTE — ED PROVIDER NOTE - NEUROLOGICAL, MLM
[FreeTextEntry1] : INTERLAMINAR EPIDURAL STEROID INJECTION\par \par The patient was placed in the prone position on the fluoroscopic table with a pillow under the abdomen.  Routine monitors were applied.  A surgical timeout was performed.  The back was prepped and draped in the usual sterile fashion and sterile technique was adhered to throughout the entire procedure.\par \par The [L5-S1] was identified under fluroscopic guidance.  The vertebral body end plates were aligned and the spinous process was midline between the lateral processes.  The skin and subcutaneous tissues were infiltrated with [1% Lidocaine].  After adequate local anesthesia a ["20g Tuohy"] needle was inserted at [L5-S1]   and aimed towards bilateral side.  \par \par Using a loss of resistance to air technique the epidural space was identified.  After negative aspiration for heme and CSF, 1cc Omnipaque N180 contrast dye was injected.  Epidural spread of contrast was confirmed in the AP and lateral views.  The patient was injected with a mixture of 80mg kenalog with 1cc lidocaine 1% and 2cc of PF normal saline in incremental amounts.\par \par The patient tolerated the procedure well.  There was no neurological deficit post procedure.  The patient went to recovery room in stable condition.  Discharge instructions were given to the patient.\par  Alert and oriented, no focal deficits, no motor or sensory deficits.

## 2023-06-23 ENCOUNTER — LABORATORY RESULT (OUTPATIENT)
Age: 88
End: 2023-06-23

## 2023-07-21 ENCOUNTER — INPATIENT (INPATIENT)
Facility: HOSPITAL | Age: 88
LOS: 9 days | Discharge: NURSING, REHAB OR LONG-TERM CARE | DRG: 603 | End: 2023-07-31
Attending: STUDENT IN AN ORGANIZED HEALTH CARE EDUCATION/TRAINING PROGRAM | Admitting: STUDENT IN AN ORGANIZED HEALTH CARE EDUCATION/TRAINING PROGRAM
Payer: MEDICARE

## 2023-07-21 ENCOUNTER — TRANSCRIPTION ENCOUNTER (OUTPATIENT)
Age: 88
End: 2023-07-21

## 2023-07-21 VITALS
WEIGHT: 210.1 LBS | TEMPERATURE: 98 F | RESPIRATION RATE: 18 BRPM | HEART RATE: 76 BPM | OXYGEN SATURATION: 98 % | DIASTOLIC BLOOD PRESSURE: 70 MMHG | SYSTOLIC BLOOD PRESSURE: 127 MMHG

## 2023-07-21 DIAGNOSIS — Z90.2 ACQUIRED ABSENCE OF LUNG [PART OF]: Chronic | ICD-10-CM

## 2023-07-21 DIAGNOSIS — Z90.710 ACQUIRED ABSENCE OF BOTH CERVIX AND UTERUS: Chronic | ICD-10-CM

## 2023-07-21 PROCEDURE — 99285 EMERGENCY DEPT VISIT HI MDM: CPT | Mod: 25

## 2023-07-21 PROCEDURE — 36000 PLACE NEEDLE IN VEIN: CPT

## 2023-07-22 ENCOUNTER — TRANSCRIPTION ENCOUNTER (OUTPATIENT)
Age: 88
End: 2023-07-22

## 2023-07-22 DIAGNOSIS — Z29.9 ENCOUNTER FOR PROPHYLACTIC MEASURES, UNSPECIFIED: ICD-10-CM

## 2023-07-22 DIAGNOSIS — E03.9 HYPOTHYROIDISM, UNSPECIFIED: ICD-10-CM

## 2023-07-22 DIAGNOSIS — L02.91 CUTANEOUS ABSCESS, UNSPECIFIED: ICD-10-CM

## 2023-07-22 DIAGNOSIS — Z95.828 PRESENCE OF OTHER VASCULAR IMPLANTS AND GRAFTS: Chronic | ICD-10-CM

## 2023-07-22 DIAGNOSIS — M79.89 OTHER SPECIFIED SOFT TISSUE DISORDERS: ICD-10-CM

## 2023-07-22 DIAGNOSIS — I73.9 PERIPHERAL VASCULAR DISEASE, UNSPECIFIED: ICD-10-CM

## 2023-07-22 DIAGNOSIS — L03.90 CELLULITIS, UNSPECIFIED: ICD-10-CM

## 2023-07-22 DIAGNOSIS — Z85.42 PERSONAL HISTORY OF MALIGNANT NEOPLASM OF OTHER PARTS OF UTERUS: ICD-10-CM

## 2023-07-22 DIAGNOSIS — D49.59 NEOPLASM OF UNSPECIFIED BEHAVIOR OF OTHER GENITOURINARY ORGAN: ICD-10-CM

## 2023-07-22 LAB
ALBUMIN SERPL ELPH-MCNC: 2.7 G/DL — LOW (ref 3.5–5)
ALP SERPL-CCNC: 69 U/L — SIGNIFICANT CHANGE UP (ref 40–120)
ALT FLD-CCNC: 12 U/L DA — SIGNIFICANT CHANGE UP (ref 10–60)
ANION GAP SERPL CALC-SCNC: 7 MMOL/L — SIGNIFICANT CHANGE UP (ref 5–17)
APTT BLD: 22.3 SEC — LOW (ref 27.5–35.5)
AST SERPL-CCNC: 8 U/L — LOW (ref 10–40)
BASOPHILS # BLD AUTO: 0.03 K/UL — SIGNIFICANT CHANGE UP (ref 0–0.2)
BASOPHILS NFR BLD AUTO: 0.3 % — SIGNIFICANT CHANGE UP (ref 0–2)
BILIRUB SERPL-MCNC: 0.6 MG/DL — SIGNIFICANT CHANGE UP (ref 0.2–1.2)
BLD GP AB SCN SERPL QL: SIGNIFICANT CHANGE UP
BUN SERPL-MCNC: 11 MG/DL — SIGNIFICANT CHANGE UP (ref 7–18)
CALCIUM SERPL-MCNC: 9 MG/DL — SIGNIFICANT CHANGE UP (ref 8.4–10.5)
CHLORIDE SERPL-SCNC: 104 MMOL/L — SIGNIFICANT CHANGE UP (ref 96–108)
CO2 SERPL-SCNC: 28 MMOL/L — SIGNIFICANT CHANGE UP (ref 22–31)
CREAT SERPL-MCNC: 0.63 MG/DL — SIGNIFICANT CHANGE UP (ref 0.5–1.3)
EGFR: 87 ML/MIN/1.73M2 — SIGNIFICANT CHANGE UP
EOSINOPHIL # BLD AUTO: 0.13 K/UL — SIGNIFICANT CHANGE UP (ref 0–0.5)
EOSINOPHIL NFR BLD AUTO: 1.1 % — SIGNIFICANT CHANGE UP (ref 0–6)
GLUCOSE SERPL-MCNC: 99 MG/DL — SIGNIFICANT CHANGE UP (ref 70–99)
HCT VFR BLD CALC: 37.6 % — SIGNIFICANT CHANGE UP (ref 34.5–45)
HGB BLD-MCNC: 12.2 G/DL — SIGNIFICANT CHANGE UP (ref 11.5–15.5)
IMM GRANULOCYTES NFR BLD AUTO: 0.3 % — SIGNIFICANT CHANGE UP (ref 0–0.9)
INR BLD: 1.08 RATIO — SIGNIFICANT CHANGE UP (ref 0.88–1.16)
LYMPHOCYTES # BLD AUTO: 1.68 K/UL — SIGNIFICANT CHANGE UP (ref 1–3.3)
LYMPHOCYTES # BLD AUTO: 14.6 % — SIGNIFICANT CHANGE UP (ref 13–44)
MCHC RBC-ENTMCNC: 30.3 PG — SIGNIFICANT CHANGE UP (ref 27–34)
MCHC RBC-ENTMCNC: 32.4 GM/DL — SIGNIFICANT CHANGE UP (ref 32–36)
MCV RBC AUTO: 93.5 FL — SIGNIFICANT CHANGE UP (ref 80–100)
MONOCYTES # BLD AUTO: 1.01 K/UL — HIGH (ref 0–0.9)
MONOCYTES NFR BLD AUTO: 8.8 % — SIGNIFICANT CHANGE UP (ref 2–14)
NEUTROPHILS # BLD AUTO: 8.63 K/UL — HIGH (ref 1.8–7.4)
NEUTROPHILS NFR BLD AUTO: 74.9 % — SIGNIFICANT CHANGE UP (ref 43–77)
NRBC # BLD: 0 /100 WBCS — SIGNIFICANT CHANGE UP (ref 0–0)
PLATELET # BLD AUTO: 261 K/UL — SIGNIFICANT CHANGE UP (ref 150–400)
POTASSIUM SERPL-MCNC: 4 MMOL/L — SIGNIFICANT CHANGE UP (ref 3.5–5.3)
POTASSIUM SERPL-SCNC: 4 MMOL/L — SIGNIFICANT CHANGE UP (ref 3.5–5.3)
PROT SERPL-MCNC: 7.2 G/DL — SIGNIFICANT CHANGE UP (ref 6–8.3)
PROTHROM AB SERPL-ACNC: 12.9 SEC — SIGNIFICANT CHANGE UP (ref 10.5–13.4)
RBC # BLD: 4.02 M/UL — SIGNIFICANT CHANGE UP (ref 3.8–5.2)
RBC # FLD: 12.7 % — SIGNIFICANT CHANGE UP (ref 10.3–14.5)
SODIUM SERPL-SCNC: 139 MMOL/L — SIGNIFICANT CHANGE UP (ref 135–145)
WBC # BLD: 11.51 K/UL — HIGH (ref 3.8–10.5)
WBC # FLD AUTO: 11.51 K/UL — HIGH (ref 3.8–10.5)

## 2023-07-22 PROCEDURE — 99223 1ST HOSP IP/OBS HIGH 75: CPT | Mod: GC

## 2023-07-22 PROCEDURE — 73701 CT LOWER EXTREMITY W/DYE: CPT | Mod: 26,LT,MA

## 2023-07-22 PROCEDURE — 10061 I&D ABSCESS COMP/MULTIPLE: CPT

## 2023-07-22 PROCEDURE — 99221 1ST HOSP IP/OBS SF/LOW 40: CPT | Mod: 25

## 2023-07-22 RX ORDER — CEFTRIAXONE 500 MG/1
1000 INJECTION, POWDER, FOR SOLUTION INTRAMUSCULAR; INTRAVENOUS ONCE
Refills: 0 | Status: COMPLETED | OUTPATIENT
Start: 2023-07-22 | End: 2023-07-22

## 2023-07-22 RX ORDER — LEVOTHYROXINE SODIUM 125 MCG
50 TABLET ORAL DAILY
Refills: 0 | Status: DISCONTINUED | OUTPATIENT
Start: 2023-07-22 | End: 2023-07-31

## 2023-07-22 RX ORDER — VANCOMYCIN HCL 1 G
1250 VIAL (EA) INTRAVENOUS EVERY 12 HOURS
Refills: 0 | Status: DISCONTINUED | OUTPATIENT
Start: 2023-07-22 | End: 2023-07-25

## 2023-07-22 RX ORDER — CEFTRIAXONE 500 MG/1
INJECTION, POWDER, FOR SOLUTION INTRAMUSCULAR; INTRAVENOUS
Refills: 0 | Status: DISCONTINUED | OUTPATIENT
Start: 2023-07-22 | End: 2023-07-25

## 2023-07-22 RX ORDER — CEFTRIAXONE 500 MG/1
1000 INJECTION, POWDER, FOR SOLUTION INTRAMUSCULAR; INTRAVENOUS EVERY 24 HOURS
Refills: 0 | Status: DISCONTINUED | OUTPATIENT
Start: 2023-07-23 | End: 2023-07-25

## 2023-07-22 RX ORDER — ONDANSETRON 8 MG/1
4 TABLET, FILM COATED ORAL ONCE
Refills: 0 | Status: COMPLETED | OUTPATIENT
Start: 2023-07-22 | End: 2023-07-22

## 2023-07-22 RX ORDER — FENTANYL CITRATE 50 UG/ML
25 INJECTION INTRAVENOUS
Refills: 0 | Status: DISCONTINUED | OUTPATIENT
Start: 2023-07-22 | End: 2023-07-22

## 2023-07-22 RX ORDER — KETOROLAC TROMETHAMINE 30 MG/ML
15 SYRINGE (ML) INJECTION EVERY 8 HOURS
Refills: 0 | Status: DISCONTINUED | OUTPATIENT
Start: 2023-07-22 | End: 2023-07-24

## 2023-07-22 RX ORDER — ACETAMINOPHEN 500 MG
650 TABLET ORAL EVERY 6 HOURS
Refills: 0 | Status: DISCONTINUED | OUTPATIENT
Start: 2023-07-22 | End: 2023-07-31

## 2023-07-22 RX ORDER — ENOXAPARIN SODIUM 100 MG/ML
40 INJECTION SUBCUTANEOUS EVERY 24 HOURS
Refills: 0 | Status: DISCONTINUED | OUTPATIENT
Start: 2023-07-23 | End: 2023-07-31

## 2023-07-22 RX ORDER — VANCOMYCIN HCL 1 G
1000 VIAL (EA) INTRAVENOUS ONCE
Refills: 0 | Status: COMPLETED | OUTPATIENT
Start: 2023-07-22 | End: 2023-07-22

## 2023-07-22 RX ADMIN — Medication 15 MILLIGRAM(S): at 15:21

## 2023-07-22 RX ADMIN — Medication 166.67 MILLIGRAM(S): at 17:24

## 2023-07-22 RX ADMIN — Medication 15 MILLIGRAM(S): at 15:35

## 2023-07-22 RX ADMIN — Medication 50 MICROGRAM(S): at 16:44

## 2023-07-22 RX ADMIN — Medication 250 MILLIGRAM(S): at 03:38

## 2023-07-22 RX ADMIN — Medication 0.5 MILLIGRAM(S): at 04:56

## 2023-07-22 RX ADMIN — CEFTRIAXONE 100 MILLIGRAM(S): 500 INJECTION, POWDER, FOR SOLUTION INTRAMUSCULAR; INTRAVENOUS at 16:45

## 2023-07-22 RX ADMIN — ONDANSETRON 4 MILLIGRAM(S): 8 TABLET, FILM COATED ORAL at 11:46

## 2023-07-22 RX ADMIN — Medication 650 MILLIGRAM(S): at 21:57

## 2023-07-22 NOTE — H&P ADULT - ATTENDING COMMENTS
Patient was seen and examined at bedside. Denies any fever, chills at home. Denies any outpatient antibiotic use     ICU Vital Signs Last 24 Hrs  T(C): 36.9 (22 Jul 2023 11:18), Max: 36.9 (21 Jul 2023 22:00)  T(F): 98.4 (22 Jul 2023 11:18), Max: 98.5 (22 Jul 2023 08:40)  HR: 64 (22 Jul 2023 11:58) (64 - 82)  BP: 141/55 (22 Jul 2023 11:58) (115/60 - 154/64)  BP(mean): 81 (22 Jul 2023 11:58) (81 - 94)  ABP: --  ABP(mean): --  RR: 14 (22 Jul 2023 11:58) (13 - 22)  SpO2: 97% (22 Jul 2023 11:58) (95% - 99%)    O2 Parameters below as of 22 Jul 2023 11:58  Patient On (Oxygen Delivery Method): room air      P/E:  NAD  AAOx3, no focal deficit   CTABL  S1S2 WNL  Abd soft, non tender, BS present   BLLE no edema or calf tenderness     Labs noted   CT scan noted     A/P:  Left post thigh abscess with surrounding cellulitis   H/o endometrial cancer with lung mets s/p resection, chemo and radiation   H/o JENNIFER lobectomy   LE edema chronic   PAD s/p LE sent     Plan:   S/p I&D in OR by Dr. Howard, some extension of inflammation into fascial plan but not nec. fasc as reported by Dr. Howard  Will start Vanc and Ceftriaxone   ESR.CRP  Wound dressing as per surgery   Tylenol and Ketorolac for pain   Cont Levothyroxine   Patient not on aspirin at home, h/o LE stent   Discussed the plan with MAR  Plan of care was discussed with patient; all questions and concerns were addressed and care was aligned with patient's wishes. Patient was seen and examined at bedside. Denies any fever, chills at home. Denies any outpatient antibiotic use     ICU Vital Signs Last 24 Hrs  T(C): 36.9 (22 Jul 2023 11:18), Max: 36.9 (21 Jul 2023 22:00)  T(F): 98.4 (22 Jul 2023 11:18), Max: 98.5 (22 Jul 2023 08:40)  HR: 64 (22 Jul 2023 11:58) (64 - 82)  BP: 141/55 (22 Jul 2023 11:58) (115/60 - 154/64)  BP(mean): 81 (22 Jul 2023 11:58) (81 - 94)  ABP: --  ABP(mean): --  RR: 14 (22 Jul 2023 11:58) (13 - 22)  SpO2: 97% (22 Jul 2023 11:58) (95% - 99%)    O2 Parameters below as of 22 Jul 2023 11:58  Patient On (Oxygen Delivery Method): room air      P/E:  NAD  AAOx3, no focal deficit   CTABL  S1S2 WNL  Abd soft, non tender, BS present   BLLE no edema or calf tenderness   5x5cm ulcer with induration and oozing pus    Labs noted   CT scan noted     A/P:  Left post thigh abscess with surrounding cellulitis   H/o endometrial cancer with lung mets s/p resection, chemo and radiation   H/o JENNIFER lobectomy   LE edema chronic   PAD s/p LE sent     Plan:   S/p I&D in OR by Dr. Howard, some extension of inflammation into fascial plan but not nec. fasc as reported by Dr. Howard  Will start Vanc and Ceftriaxone   ESR.CRP  Wound dressing as per surgery   Tylenol and Ketorolac for pain   Cont Levothyroxine   Patient not on aspirin at home, h/o LE stent   Discussed the plan with MAR  Plan of care was discussed with patient; all questions and concerns were addressed and care was aligned with patient's wishes.

## 2023-07-22 NOTE — ED PROVIDER NOTE - CLINICAL SUMMARY MEDICAL DECISION MAKING FREE TEXT BOX
Patient with a history of peripheral arterial disease presenting with abscess and cellulitis of left lower extremity.  Will obtain labs, CT abdomen pelvis to evaluate depth of abscess start IV antibiotics.  May obtain surgical consultation to assist with incision and drainage.  Given her history of peripheral arterial disease have low threshold to admit the patient for IV antibiotics to confirm resolution of infection

## 2023-07-22 NOTE — H&P ADULT - NSHPPHYSICALEXAM_GEN_ALL_CORE
PHYSICAL EXAMINATION:  GENERAL: NAD, lying in bed comfortably  HEAD:  Atraumatic, Normocephalic  EYES:  conjunctiva and sclera clear  NECK: Supple, No JVD, Normal thyroid  CHEST/LUNG: Clear to auscultation. Clear to percussion bilaterally; No rales, rhonchi, wheezing, or rubs  HEART: Regular rate and rhythm; No murmurs, rubs, or gallops  ABDOMEN: Soft, Nontender, (+) left lower abdominal hernia, reducible. Bowel sounds present  NERVOUS SYSTEM:  Alert & Oriented X3,    EXTREMITIES: (+) left posterior upper thigh erythema, warmth and tenderness with 5x5 cm abscess with purulent puss draining.   2+ Peripheral Pulses, No clubbing, cyanosis, (+) LE edema L>R  SKIN: warm dry

## 2023-07-22 NOTE — H&P ADULT - PROBLEM SELECTOR PLAN 3
hx hypothyroid on levothyroxine 50mcg  cont home med hx hypothyroid on levothyroxine 50mcg  cont home med  f/u TSH

## 2023-07-22 NOTE — PATIENT PROFILE ADULT - FUNCTIONAL ASSESSMENT - BASIC MOBILITY 6.
2-calculated by average/Not able to assess (calculate score using [facility] averaging method)

## 2023-07-22 NOTE — H&P ADULT - HISTORY OF PRESENT ILLNESS
This is an 86 y/o female from home with Pmhx of HTN, PAD with left LE stent( sep 22), endometrial ca with JENNIFER lung mets s/p resection, chemo, and radiotherapy presenting to the hospital with left thigh swelling. Patient states she started having pain in posterior left thigh three days ago with progressive swelling. She came to the ED for increased swelling an abscess formation.  She endorses drowsiness and fatigue. Denies any fevers, abdominal pain, N/V/D.  Patient follows up with Dr. Rose Crowley oncology. She has new 5 mm lung nodule and cecal mass. Referred to GI for colonoscopy, anastrazole held since May 23.     In ED:   vitals: T: 98, HR: 79, BP: 132/75 mmHg, 96% on RA  WBC 11.5  CT w/ IV cont LLE: cellulitis post medial proximal and mid left thigh 2.5 x 2.1 x 3  s/p Vanc

## 2023-07-22 NOTE — BRIEF OPERATIVE NOTE - NSICDXBRIEFPROCEDURE_GEN_ALL_CORE_FT
PROCEDURES:  Incision and drainage, thigh 22-Jul-2023 12:20:40 LEFT POSTERIOR THIGH Amparo Roberts

## 2023-07-22 NOTE — CONSULT NOTE ADULT - SUBJECTIVE AND OBJECTIVE BOX
HPI:  This is an 86 y/o female from home with Pmhx of HTN, PAD with left LE stent( sep 22), endometrial ca with JENNIFER lung mets s/p resection, chemo, and radiotherapy presenting to the hospital with left thigh swelling. Patient states she started having pain in posterior left thigh three days ago with progressive swelling. She came to the ED for increased swelling an abscess formation.  She endorses drowsiness and fatigue. Denies any fevers, abdominal pain, N/V/D.  Patient follows up with Dr. Rose Crowley oncology. She has new 5 mm lung nodule and cecal mass. Referred to GI for colonoscopy, anastrazole held since May 23.     In ED:   vitals: T: 98, HR: 79, BP: 132/75 mmHg, 96% on RA  WBC 11.5  CT w/ IV cont LLE: cellulitis post medial proximal and mid left thigh 2.5 x 2.1 x 3  s/p Vanc  (22 Jul 2023 10:02)      PAST MEDICAL & SURGICAL HISTORY:  Hypertension      Peripheral arterial disease      Status post insertion of iliac artery stent          codeine (Nausea)      Meds:  acetaminophen     Tablet .. 650 milliGRAM(s) Oral every 6 hours PRN  cefTRIAXone   IVPB      cefTRIAXone   IVPB 1000 milliGRAM(s) IV Intermittent once  ketorolac   Injectable 15 milliGRAM(s) IV Push every 8 hours PRN  levothyroxine 50 MICROGram(s) Oral daily  vancomycin  IVPB 1250 milliGRAM(s) IV Intermittent every 12 hours      SOCIAL HISTORY:  Smoker:  YES / NO        PACK YEARS:                         WHEN QUIT?  ETOH use:  YES / NO               FREQUENCY / QUANTITY:  Ilicit Drug use:  YES / NO  Occupation:  Assisted device use (Cane / Walker):  Live with:    FAMILY HISTORY:      VITALS:  Vital Signs Last 24 Hrs  T(C): 36.5 (22 Jul 2023 14:10), Max: 36.9 (21 Jul 2023 22:00)  T(F): 97.7 (22 Jul 2023 14:10), Max: 98.5 (22 Jul 2023 08:40)  HR: 60 (22 Jul 2023 14:10) (60 - 82)  BP: 124/62 (22 Jul 2023 14:10) (115/60 - 154/64)  BP(mean): 81 (22 Jul 2023 11:58) (81 - 94)  RR: 18 (22 Jul 2023 14:10) (13 - 22)  SpO2: 96% (22 Jul 2023 14:10) (95% - 99%)    Parameters below as of 22 Jul 2023 14:10  Patient On (Oxygen Delivery Method): room air        LABS/DIAGNOSTIC TESTS:                          12.2   11.51 )-----------( 261      ( 22 Jul 2023 03:32 )             37.6     WBC Count: 11.51 K/uL (07-22 @ 03:32)      07-22    139  |  104  |  11  ----------------------------<  99  4.0   |  28  |  0.63    Ca    9.0      22 Jul 2023 03:32    TPro  7.2  /  Alb  2.7<L>  /  TBili  0.6  /  DBili  x   /  AST  8<L>  /  ALT  12  /  AlkPhos  69  07-22            LIVER FUNCTIONS - ( 22 Jul 2023 03:32 )  Alb: 2.7 g/dL / Pro: 7.2 g/dL / ALK PHOS: 69 U/L / ALT: 12 U/L DA / AST: 8 U/L / GGT: x             PT/INR - ( 22 Jul 2023 03:32 )   PT: 12.9 sec;   INR: 1.08 ratio         PTT - ( 22 Jul 2023 03:32 )  PTT:22.3 sec    LACTATE:    ABG -     CULTURES:       RADIOLOGY:< from: CT Lower Extremity w/ IV Cont, Left (07.22.23 @ 05:48) >  ACC: [id number] EXAM:  CT LWR EXT IC LT   ORDERED BY: RADHA NEFF     PROCEDURE DATE:  07/22/2023          INTERPRETATION:  PROCEDURE INFORMATION:  Exam: CT Left Lower Extremity With Contrast  Exam date and time: 7/22/2023 5:42 AM    Clinical indication: L posterior thigh abscess, evaluate depth    TECHNIQUE:  Imaging protocol: CT of the left lower extremity with intravenous   contrast was  performed.  Contrast material: IV: OMNIPAQUE 350; Contrast volume: 90 ml; Contrast   route:  IV;    COMPARISON:  None    FINDINGS:    No acute fracture. Diffuse osseous demineralization. Mild osteoarthritis   of the hip. Severe tricompartmental osteoarthritis of the knee.    Subcutaneous fat stranding and skin thickening along the posterior and   medial aspect of the proximal and mid left thigh. There is a   rim-enhancing fluid collection within the subcutaneous tissues of the   posterior mid thigh which extends up to the skin surface and roughly   measures 2.2 x 2.0 x 3.1 cm.  There  is no soft tissue emphysema.    Several mildly enlarged left iliac lymph nodes, most likely reactive.   Sigmoid diverticulosis without evidence of diverticulitis. Moderate   atherosclerotic vascular calcifications. Left external iliac artery   stent, incompletely imaged.    IMPRESSION:  Rim-enhancing fluid collection within the subcutaneous tissues of the   posterior mid thigh which extends up to the skin surface and measures up   to 3.1 cm representing abscess with adjacent soft tissue edema and skin   thickening.    --- End of Report ---            DANII POLANCO DO; Attending Radiologist  This document has been electronically signed. Jul 22 2023 12:00PM    < end of copied text >        ROS  [  ] UNABLE TO ELICIT               HPI:  This is an 88 y/o female from home with Pmhx of HTN, PAD with left LE stent( sep 22), endometrial ca with JENNIFER lung mets s/p resection, chemo, and radiotherapy presenting to the hospital with left thigh swelling. Patient states she started having pain in posterior left thigh three days ago with progressive swelling. She came to the ED for increased swelling an abscess formation.  She endorses drowsiness and fatigue. Denies any fevers, abdominal pain, N/V/D.  Patient follows up with Dr. Rose Crowley oncology. She has new 5 mm lung nodule and cecal mass. Referred to GI for colonoscopy, anastrazole held since May 23.     In ED:   vitals: T: 98, HR: 79, BP: 132/75 mmHg, 96% on RA  WBC 11.5  CT w/ IV cont LLE: cellulitis post medial proximal and mid left thigh 2.5 x 2.1 x 3  s/p Vanc  (22 Jul 2023 10:02)          History as above, asked to see this patient who presented with left thigh swelling and pain x 3-4 days and got progressively worse and then started draining, she has surrounding redness and warmth of her skin. She had no fevers or chills, no trauma to the area, and does not recall any insect bites to the area. She was found to have a small thigh abscess and had a I&D in the OR today and is feeling better with much less pain. She has no other complaints.            PAST MEDICAL & SURGICAL HISTORY:  Hypertension      Peripheral arterial disease      Status post insertion of iliac artery stent          codeine (Nausea)      Meds:  acetaminophen     Tablet .. 650 milliGRAM(s) Oral every 6 hours PRN  cefTRIAXone   IVPB      cefTRIAXone   IVPB 1000 milliGRAM(s) IV Intermittent once  ketorolac   Injectable 15 milliGRAM(s) IV Push every 8 hours PRN  levothyroxine 50 MICROGram(s) Oral daily  vancomycin  IVPB 1250 milliGRAM(s) IV Intermittent every 12 hours      SOCIAL HISTORY:  Smoker:  no  ETOH use:  no      FAMILY HISTORY: not contributory      VITALS:  Vital Signs Last 24 Hrs  T(C): 36.5 (22 Jul 2023 14:10), Max: 36.9 (21 Jul 2023 22:00)  T(F): 97.7 (22 Jul 2023 14:10), Max: 98.5 (22 Jul 2023 08:40)  HR: 60 (22 Jul 2023 14:10) (60 - 82)  BP: 124/62 (22 Jul 2023 14:10) (115/60 - 154/64)  BP(mean): 81 (22 Jul 2023 11:58) (81 - 94)  RR: 18 (22 Jul 2023 14:10) (13 - 22)  SpO2: 96% (22 Jul 2023 14:10) (95% - 99%)    Parameters below as of 22 Jul 2023 14:10  Patient On (Oxygen Delivery Method): room air        LABS/DIAGNOSTIC TESTS:                          12.2   11.51 )-----------( 261      ( 22 Jul 2023 03:32 )             37.6     WBC Count: 11.51 K/uL (07-22 @ 03:32)      07-22    139  |  104  |  11  ----------------------------<  99  4.0   |  28  |  0.63    Ca    9.0      22 Jul 2023 03:32    TPro  7.2  /  Alb  2.7<L>  /  TBili  0.6  /  DBili  x   /  AST  8<L>  /  ALT  12  /  AlkPhos  69  07-22            LIVER FUNCTIONS - ( 22 Jul 2023 03:32 )  Alb: 2.7 g/dL / Pro: 7.2 g/dL / ALK PHOS: 69 U/L / ALT: 12 U/L DA / AST: 8 U/L / GGT: x             PT/INR - ( 22 Jul 2023 03:32 )   PT: 12.9 sec;   INR: 1.08 ratio         PTT - ( 22 Jul 2023 03:32 )  PTT:22.3 sec    LACTATE:    ABG -     CULTURES:       RADIOLOGY:< from: CT Lower Extremity w/ IV Cont, Left (07.22.23 @ 05:48) >  ACC: [id number] EXAM:  CT LWR EXT IC LT   ORDERED BY: RADHA NEFF     PROCEDURE DATE:  07/22/2023          INTERPRETATION:  PROCEDURE INFORMATION:  Exam: CT Left Lower Extremity With Contrast  Exam date and time: 7/22/2023 5:42 AM    Clinical indication: L posterior thigh abscess, evaluate depth    TECHNIQUE:  Imaging protocol: CT of the left lower extremity with intravenous   contrast was  performed.  Contrast material: IV: OMNIPAQUE 350; Contrast volume: 90 ml; Contrast   route:  IV;    COMPARISON:  None    FINDINGS:    No acute fracture. Diffuse osseous demineralization. Mild osteoarthritis   of the hip. Severe tricompartmental osteoarthritis of the knee.    Subcutaneous fat stranding and skin thickening along the posterior and   medial aspect of the proximal and mid left thigh. There is a   rim-enhancing fluid collection within the subcutaneous tissues of the   posterior mid thigh which extends up to the skin surface and roughly   measures 2.2 x 2.0 x 3.1 cm.  There  is no soft tissue emphysema.    Several mildly enlarged left iliac lymph nodes, most likely reactive.   Sigmoid diverticulosis without evidence of diverticulitis. Moderate   atherosclerotic vascular calcifications. Left external iliac artery   stent, incompletely imaged.    IMPRESSION:  Rim-enhancing fluid collection within the subcutaneous tissues of the   posterior mid thigh which extends up to the skin surface and measures up   to 3.1 cm representing abscess with adjacent soft tissue edema and skin   thickening.    --- End of Report ---            DANII POLANCO DO; Attending Radiologist  This document has been electronically signed. Jul 22 2023 12:00PM    < end of copied text >        ROS  [  ] UNABLE TO ELICIT

## 2023-07-22 NOTE — CONSULT NOTE ADULT - SUBJECTIVE AND OBJECTIVE BOX
HISTORY OF PRESENT ILLNESS:  Ms. Adam is an 87 year old woman with multiple medical comorbidities presenting with LLE cellulitis. Reports that she noticed a lump in the back of her left thigh about 5 days ago that has since enlarged and started draining pus. Complains of pain only at site of abscess. Denies fevers or chills. CT LLE demonstrating 2.5cm abscess with surrounding inflammation    PAST MEDICAL HISTORY: Hypertension    Peripheral arterial disease        PAST SURGICAL HISTORY: No significant past surgical history    Status post insertion of iliac artery stent        HOME MEDICATIONS:    ALLERGIES: codeine (Nausea)      FAMILY HISTORY:     SOCIAL HISTORY:    REVIEW OF SYSTEMS:    VITAL SIGNS:  ICU Vital Signs Last 24 Hrs  T(C): 36.8 (22 Jul 2023 04:19), Max: 36.9 (21 Jul 2023 22:00)  T(F): 98.2 (22 Jul 2023 04:19), Max: 98.4 (21 Jul 2023 22:00)  HR: 79 (22 Jul 2023 04:19) (76 - 79)  BP: 132/75 (22 Jul 2023 04:19) (127/70 - 132/75)  BP(mean): --  ABP: --  ABP(mean): --  RR: 18 (22 Jul 2023 04:19) (18 - 18)  SpO2: 96% (22 Jul 2023 04:19) (96% - 98%)    O2 Parameters below as of 22 Jul 2023 04:19  Patient On (Oxygen Delivery Method): room air            PHYSICAL EXAMINATION:  General - well-nourished, no acute distress  Neuro - awake, alert, oriented  Abdomen - soft, nontender, nondistended  Extremities - left posterior thigh with 1cm lesion draining pus, no fluctuance palpable, surround induration and erythema present    LABS:                          12.2   11.51 )-----------( 261      ( 22 Jul 2023 03:32 )             37.6       07-22    139  |  104  |  11  ----------------------------<  99  4.0   |  28  |  0.63    Ca    9.0      22 Jul 2023 03:32    TPro  7.2  /  Alb  2.7<L>  /  TBili  0.6  /  DBili  x   /  AST  8<L>  /  ALT  12  /  AlkPhos  69  07-22      PT/INR - ( 22 Jul 2023 03:32 )   PT: 12.9 sec;   INR: 1.08 ratio         PTT - ( 22 Jul 2023 03:32 )  PTT:22.3 sec            RECENT CULTURES:      CAPILLARY BLOOD GLUCOSE          IMAGING STUDIES:  < from: CT Lower Extremity w/ IV Cont, Left (07.22.23 @ 05:48) >  COMPARISON:  CT ABDOMEN AND PELVIS WITH ORAL CONTRAST WITH IV CONTRAST 5/17/2023 3:20   PM    FINDINGS:  Bones/joints: Diffuse osseous demineralization. No fractures or suspicious  osseous lesions. Advanced tricompartmental left knee osteoarthritis. Mild   left  hip osteoarthritis.  Soft tissues: Thereis subcutaneous fat stranding and skin thickening   along the  posterior and medial aspect of the proximal and mid left thigh, findings  compatible with cellulitis. There is an irregular focus of rim-enhancing   fluid  density in the posterior mid thigh which extends up to the skin surface,  compatible with abscess. This measures approximately 2.5 cm in maximum   depth  (AP), 2.1 cm craniocaudal, and 3 cm TRV (see series 3, images ).    There  is no soft tissue emphysema.    Other findings:Several mildly enlarged left iliac lymph nodes, most   likely  reactive. Sigmoid diverticulosis without evidence of diverticulitis.   Moderate  atherosclerotic vascular calcifications. Bilateral external iliac artery  stents, incompletely imaged.    IMPRESSION:  Findings compatible with cellulitis in the posterior and medial   proximal-mid  left thigh with a 2.5 cm x 2.1 cm x 3 cm subcutaneous abscess posteriorly.

## 2023-07-22 NOTE — H&P ADULT - ASSESSMENT
Request DENIED.  Patient is currently inpt at [facility].   
This is an 88 y/o female from home with Pmhx of HTN, PAD with left LE stent( sep 22), endometrial ca with JENNIFER lung mets s/p resection, chemo, and radiotherapy presenting to the hospital with left thigh swelling. Admitted for cellulitis with abscess to left upper thigh, for I&D in OR.      In ED:   vitals: T: 98, HR: 79, BP: 132/75 mmHg, 96% on RA  WBC 11.5  CT w/ IV cont LLE: cellulitis post medial proximal and mid left thigh 2.5 x 2.1 x 3  s/p Vanc

## 2023-07-22 NOTE — PATIENT PROFILE ADULT - FALL HARM RISK - HARM RISK INTERVENTIONS

## 2023-07-22 NOTE — ED ADULT NURSE NOTE - OBJECTIVE STATEMENT
abscess with pain and drainage to left posterior thigh close to left buttocks c 3 days. AO4, w/c bound

## 2023-07-22 NOTE — ED PROVIDER NOTE - PHYSICAL EXAMINATION
Exam:  General: Patient well appearing, vital signs within normal limits  HEENT: airway patent with moist mucous membranes  Cardiac: RRR S1/S2 with strong peripheral pulses  Respiratory: lungs clear without respiratory distress  GI: abdomen soft, non tender, non distended  MSK: L posterior thigh abscess with surrounding cellulitis, unable to palpate to base at bedside, redness not tracking into groin  Skin: warm, well perfused  Psych: normal mood and affect

## 2023-07-22 NOTE — ED ADULT NURSE NOTE - NSFALLHARMRISKINTERV_ED_ALL_ED

## 2023-07-22 NOTE — CONSULT NOTE ADULT - ASSESSMENT
Ms. Adam is an 87 year old woman with multiple medical comorbidities presenting with LLE cellulitis with superficial abscess.    Recommendations:  - no acute surgical intervention indicated  - small abscess open and draining spontaneously  - agree with admission and IV antibiotics    discussed with Dr. Howard
Left thigh abscess/ cellulitis - S/P I&D  Leukocytosis - mild      Plan - Cont Vancomycin 1250mgs iv q12hrs   Cont Rocephin 1 gm iv q24hrs for now  await Abscess culture results   will likely DC home on Monday on PO antibiotics.

## 2023-07-22 NOTE — H&P ADULT - CONVERSATION DETAILS
GOC discussed with patient in regards to overall medical condition. Patient states she has a living will. She is not ready to make a decision and would like to remain full code for now.

## 2023-07-22 NOTE — CONSULT NOTE ADULT - MENTAL STATUS
Patient is seen at the request of Angel Velásquez MD.    Reason for Consult:    Chief Complaint   Patient presents with   • Office Visit   • Consultation     S/P - CAD   • Shortness of Breath   • Edema     Both feet   • ER F/U     Radu - 8/3/2021 - Swelling, Weakness, Loss of appetite       HISTORY OF PRESENT ILLNESS: Joselyn Ricci is a 87 year old Female. She has HFpEF. BP uncontrolled because she didn't take her meds today. She has CKD 4 on monitoring. She has chronic edema both feet. Foot hygiene is an issue. She has knee enlargement asso w/ arthritis    PAST MEDICAL HISTORY:     Congestive cardiac failure (CMS/HCC)                          PAST SURGICAL HISTORY:   There is no previous surgical history on file.    ALLERGIES:  Patient has no known allergies.    MEDICATIONS:   Current Outpatient Medications   Medication Sig Dispense Refill   • torsemide (DEMADEX) 20 MG tablet Take 2 tablets by mouth 2 times daily. 360 tablet 0   • atorvastatin (LIPITOR) 80 MG tablet TAKE 1 TABLET(80 MG) BY MOUTH 1 TIME AT NIGHT FOR CHOLESTEROL 90 tablet 0   • hydrALAZINE (APRESOLINE) 100 MG tablet Take 1 tablet by mouth 3 times daily. 270 tablet 0   • aspirin 81 MG EC tablet Take 1 tablet by mouth daily. 90 tablet 1   • allopurinol (ZYLOPRIM) 100 MG tablet Take 1 tablet by mouth daily. 90 tablet 1   • Acetaminophen 325 MG Cap Take 650 mg by mouth every 6 hours as needed (pain). for moderate pain     • fluticasone (FLONASE) 50 MCG/ACT nasal spray Spray 1 spray in each nostril daily.     • alendronate (FOSAMAX) 35 MG tablet      • latanoprost (XALATAN) 0.005 % ophthalmic solution INSTILL 1 DROP INTO BOTH EYES EVERY NIGHT AT BEDTIME     • potassium CHLORIDE (KLOR-CON M) 20 MEQ rea ER tablet      • atorvastatin (LIPITOR) 80 MG tablet Take 1 tablet by mouth daily. 90 tablet 3     No current facility-administered medications for this visit.       SOCIAL HISTORY:    reports no history of alcohol use.    reports that she has  never smoked. She has never used smokeless tobacco.     Drug Use:    Never             FAMILY HISTORY:   History reviewed. No pertinent family history.    Review of Systems  Systemic: no weight change. no fever, no chills and no fatigue.   Cardiovascular:. As noted in HPI.   Pulmonary: no chronic cough, no hemoptysis, no new dyspnea and no sputum.   Gastrointestinal: no melena, no bleeding and no hematemesis.   Genitourinary: CKD  Hematologic and Lymphatic: no tendency for easy bruising and no tendency for easy bleeding.   Musculoskeletal: knee pain and generalized weakness   Neurological: hx stroke  Psychiatric: no feelings of hopelessness, no anhedonia and no depression.   Integumentary and Breasts: no rashes, no skin lesions and no skin ulcer.     Physical Exam   Visit Vitals  BP (!) 147/83 (BP Location: LUE - Left upper extremity, Patient Position: Sitting)   Pulse 68   Temp 97.7 °F (36.5 °C) (Oral)   Ht 5' 6\" (1.676 m)   Wt 67.8 kg (149 lb 9.3 oz)   SpO2 100%   BMI 24.14 kg/m²     Constitutional: No distress.   HENT:   Head: Normocephalic.   Eyes: Conjunctivae are normal. No scleral icterus.   Neck: Neck supple. No JVD present. No thyromegaly present.   Cardiovascular: Normal rate, slightly irregular rhythm, S1 normal, S2 normal.   no gallop and no friction rub.   No murmur heard.  Intact carotid and radial pulses. Foot and ankle pulses not  felt.  Pulmonary/Chest: No respiratory distress. She has no wheezes. She has no rales. She exhibits no tenderness.   Abdominal: Soft. Bowel sounds are normal. She exhibits no tenderness.   Musculoskeletal:  no deformity.   No pitting or nonpitting edema   Neurological: She  is alert and oriented to person, place, and time.   Skin:  not diaphoretic. No pallor. Scarring of feet w/ hyperpigmentation, lichenification,     Nursing note and vitals reviewed.    RESULTS  ECG 8/2021  SR w/ atrial ectopy    8/4/2021 TTE  EF 56, grade 2 dd, mild to mod AI, mod MR, mildly enlarged  LA    Labs:    HGB (g/dL)   Date Value   09/08/2021 8.1 (L)     HCT (%)   Date Value   09/08/2021 25.0 (L)     PLT (K/mcL)   Date Value   09/08/2021 180     MCV (fl)   Date Value   09/08/2021 86.8     MCH (pg)   Date Value   09/08/2021 28.1     MCHC (g/dL)   Date Value   09/08/2021 32.4       TSH (mcUnits/mL)   Date Value   06/05/2021 6.481 (H)       Hemoglobin A1C (%)   Date Value   08/05/2021 5.7 (H)     Creatinine (mg/dL)   Date Value   11/12/2021 2.76 (H)     Glomerular Filtration Rate   Date Value Ref Range Status   11/12/2021 17 (L) >=60 Final     Comment:     eGFR 15 - 29 mL/min/1.73 m2 = Severe decrease in kidney function. Stage 4 CKD (chronic kidney disease), or severe kidney disease. Estimated GFR calculated using the 2009 CKD-EPI creatinine equation.     GFR Estimate,  (no units)   Date Value   07/21/2020     eGFR 30-59 mL/min/1.73m2 = Moderate decrease in kidney function. Stage 3 CKD (chronic kidney disease) or moderate kidney disease.   07/21/2020 31     Calcium (mg/dL)   Date Value   11/12/2021 9.7     NT-proBNP (pg/mL)   Date Value   08/03/2021 2,769 (H)     Potassium (mmol/L)   Date Value   11/12/2021 4.2       IMPRESSION/PLAN:   AHA Stage B , NYHA FC indeterminate due to debility   Stage IV (GFR 15-29): adequate urine output: monitoring-not on ACEi or ARB. Not on beta blocker because of hx bradycardia   chronic fungal infection -feet- associated w/ edema. Wound clinic referral  HFpEF w/ uncontrolled HTN: counseled on taking BP meds to improve cardiac function. More frequent BP monitoring will inform med management    As needed f/u from [facility]   electronically signed by   Tawana Zavala MD   12/15/21        AAO x 3

## 2023-07-22 NOTE — H&P ADULT - PROBLEM SELECTOR PLAN 1
p/w left posterior upper thight cellulitis and abscess  - vitals: T: 98, HR: 79, BP: 132/75 mmHg, 96% on RA  - CT w/ IV cont LLE: cellulitis post medial proximal and mid left thigh 2.5 x 2.1 x 3  - s/p Vanc in ED  - Surgery consulted, pt for I&D in OR  - Start vancomycin and ceftriaxone  - f/u intra-op wound cultures for MRSA  - f/u ESR, CRP  - PT Eval   - Tylenol and ketorolac for pain p/w left posterior upper thight cellulitis and abscess  - vitals: T: 98, HR: 79, BP: 132/75 mmHg, 96% on RA  - CT w/ IV cont LLE: cellulitis post medial proximal and mid left thigh 2.5 x 2.1 x 3  - s/p Vanc in ED  - Surgery consulted, pt for I&D in OR  - Start vancomycin and ceftriaxone  - f/u intra-op wound cultures, follow MRSA swab  - f/u ESR, CRP  - PT Eval   - Tylenol and ketorolac for pain p/w left posterior upper thight cellulitis and abscess  - vitals: T: 98, HR: 79, BP: 132/75 mmHg, 96% on RA  - CT w/ IV cont LLE: cellulitis post medial proximal and mid left thigh 2.5 x 2.1 x 3  - s/p Vanc in ED  - Surgery consulted, pt for I&D in OR  - Start vancomycin and ceftriaxone  - f/u intra-op wound cultures, follow MRSA swab  - f/u ESR, CRP  - PT Eval   - Tylenol and ketorolac for pain  - ID Dr. Boggs

## 2023-07-22 NOTE — H&P ADULT - PROBLEM SELECTOR PLAN 4
primary ca of endometrium s/p 2 sux resection (2009 &2018)  with chemo and radiotherapy  with metastasis to lung with 2cm nodule s/p JENNIFER lobectomy   pt follows up with Dr. Rose Crowley  pt has new 5 mm lung nodule and cecal mass. Referred to GI for colonoscopy, anastrazole held since May 23.

## 2023-07-22 NOTE — ED PROVIDER NOTE - PROGRESS NOTE DETAILS
Abscess with cellulitis noted on CT.  General surgery consulted for management of abscess.  Spoke to Dr Ivey (Hospitalist) who will admit.

## 2023-07-22 NOTE — ED PROVIDER NOTE - OBJECTIVE STATEMENT
87-year-old woman with a past medical history of hypertension and peripheral arterial disease status post lower extremity stents presenting with left posterior thigh pain swelling and redness worsening over the past 3 days.  She denies any injuries to the area.  She reports that she knows that she needs more stents to her legs.  She denies any associated fevers, chills, chest pains or shortness of breath.

## 2023-07-23 DIAGNOSIS — R01.1 CARDIAC MURMUR, UNSPECIFIED: ICD-10-CM

## 2023-07-23 LAB
ANION GAP SERPL CALC-SCNC: 7 MMOL/L — SIGNIFICANT CHANGE UP (ref 5–17)
BASOPHILS # BLD AUTO: 0.04 K/UL — SIGNIFICANT CHANGE UP (ref 0–0.2)
BASOPHILS NFR BLD AUTO: 0.4 % — SIGNIFICANT CHANGE UP (ref 0–2)
BUN SERPL-MCNC: 10 MG/DL — SIGNIFICANT CHANGE UP (ref 7–18)
CALCIUM SERPL-MCNC: 8.8 MG/DL — SIGNIFICANT CHANGE UP (ref 8.4–10.5)
CHLORIDE SERPL-SCNC: 106 MMOL/L — SIGNIFICANT CHANGE UP (ref 96–108)
CO2 SERPL-SCNC: 28 MMOL/L — SIGNIFICANT CHANGE UP (ref 22–31)
CREAT SERPL-MCNC: 0.62 MG/DL — SIGNIFICANT CHANGE UP (ref 0.5–1.3)
CRP SERPL-MCNC: 104 MG/L — HIGH
EGFR: 87 ML/MIN/1.73M2 — SIGNIFICANT CHANGE UP
EOSINOPHIL # BLD AUTO: 0.26 K/UL — SIGNIFICANT CHANGE UP (ref 0–0.5)
EOSINOPHIL NFR BLD AUTO: 2.8 % — SIGNIFICANT CHANGE UP (ref 0–6)
ERYTHROCYTE [SEDIMENTATION RATE] IN BLOOD: 52 MM/HR — HIGH (ref 0–20)
GLUCOSE SERPL-MCNC: 104 MG/DL — HIGH (ref 70–99)
HCT VFR BLD CALC: 38 % — SIGNIFICANT CHANGE UP (ref 34.5–45)
HGB BLD-MCNC: 12.1 G/DL — SIGNIFICANT CHANGE UP (ref 11.5–15.5)
IMM GRANULOCYTES NFR BLD AUTO: 0.2 % — SIGNIFICANT CHANGE UP (ref 0–0.9)
LYMPHOCYTES # BLD AUTO: 1.33 K/UL — SIGNIFICANT CHANGE UP (ref 1–3.3)
LYMPHOCYTES # BLD AUTO: 14.4 % — SIGNIFICANT CHANGE UP (ref 13–44)
MAGNESIUM SERPL-MCNC: 2.4 MG/DL — SIGNIFICANT CHANGE UP (ref 1.6–2.6)
MCHC RBC-ENTMCNC: 29.9 PG — SIGNIFICANT CHANGE UP (ref 27–34)
MCHC RBC-ENTMCNC: 31.8 GM/DL — LOW (ref 32–36)
MCV RBC AUTO: 93.8 FL — SIGNIFICANT CHANGE UP (ref 80–100)
MONOCYTES # BLD AUTO: 0.84 K/UL — SIGNIFICANT CHANGE UP (ref 0–0.9)
MONOCYTES NFR BLD AUTO: 9.1 % — SIGNIFICANT CHANGE UP (ref 2–14)
NEUTROPHILS # BLD AUTO: 6.74 K/UL — SIGNIFICANT CHANGE UP (ref 1.8–7.4)
NEUTROPHILS NFR BLD AUTO: 73.1 % — SIGNIFICANT CHANGE UP (ref 43–77)
NRBC # BLD: 0 /100 WBCS — SIGNIFICANT CHANGE UP (ref 0–0)
PHOSPHATE SERPL-MCNC: 2.9 MG/DL — SIGNIFICANT CHANGE UP (ref 2.5–4.5)
PLATELET # BLD AUTO: 276 K/UL — SIGNIFICANT CHANGE UP (ref 150–400)
POTASSIUM SERPL-MCNC: 4.1 MMOL/L — SIGNIFICANT CHANGE UP (ref 3.5–5.3)
POTASSIUM SERPL-SCNC: 4.1 MMOL/L — SIGNIFICANT CHANGE UP (ref 3.5–5.3)
RBC # BLD: 4.05 M/UL — SIGNIFICANT CHANGE UP (ref 3.8–5.2)
RBC # FLD: 12.8 % — SIGNIFICANT CHANGE UP (ref 10.3–14.5)
SODIUM SERPL-SCNC: 141 MMOL/L — SIGNIFICANT CHANGE UP (ref 135–145)
TSH SERPL-MCNC: 1.74 UU/ML — SIGNIFICANT CHANGE UP (ref 0.34–4.82)
VANCOMYCIN TROUGH SERPL-MCNC: 13.1 UG/ML — SIGNIFICANT CHANGE UP (ref 10–20)
WBC # BLD: 9.23 K/UL — SIGNIFICANT CHANGE UP (ref 3.8–10.5)
WBC # FLD AUTO: 9.23 K/UL — SIGNIFICANT CHANGE UP (ref 3.8–10.5)

## 2023-07-23 PROCEDURE — 99232 SBSQ HOSP IP/OBS MODERATE 35: CPT | Mod: GC

## 2023-07-23 PROCEDURE — 99233 SBSQ HOSP IP/OBS HIGH 50: CPT | Mod: GC

## 2023-07-23 RX ADMIN — Medication 15 MILLIGRAM(S): at 10:57

## 2023-07-23 RX ADMIN — Medication 650 MILLIGRAM(S): at 10:00

## 2023-07-23 RX ADMIN — Medication 15 MILLIGRAM(S): at 11:30

## 2023-07-23 RX ADMIN — CEFTRIAXONE 100 MILLIGRAM(S): 500 INJECTION, POWDER, FOR SOLUTION INTRAMUSCULAR; INTRAVENOUS at 09:23

## 2023-07-23 RX ADMIN — ENOXAPARIN SODIUM 40 MILLIGRAM(S): 100 INJECTION SUBCUTANEOUS at 07:25

## 2023-07-23 RX ADMIN — Medication 650 MILLIGRAM(S): at 09:23

## 2023-07-23 RX ADMIN — Medication 166.67 MILLIGRAM(S): at 18:24

## 2023-07-23 RX ADMIN — Medication 50 MICROGRAM(S): at 07:25

## 2023-07-23 RX ADMIN — Medication 15 MILLIGRAM(S): at 18:56

## 2023-07-23 RX ADMIN — Medication 166.67 MILLIGRAM(S): at 05:59

## 2023-07-23 NOTE — PROGRESS NOTE ADULT - SUBJECTIVE AND OBJECTIVE BOX
PGY-1 Progress Note discussed with attending    PAGER #: [[phone]] TILL 5:00 PM  PLEASE CONTACT ON CALL TEAM:  - On Call Team (Please refer to Hannah) FROM 5:00 PM - 8:30PM  - Nightfloat Team FROM 8:30 -7:30 AM    CHIEF COMPLAINT & BRIEF HOSPITAL COURSE:      INTERVAL HPI/OVERNIGHT EVENTS:       REVIEW OF SYSTEMS:  CONSTITUTIONAL: No fever, weight loss, or fatigue  RESPIRATORY: No cough, wheezing, chills or hemoptysis; No shortness of breath  CARDIOVASCULAR: No chest pain, palpitations, dizziness, or leg swelling  GASTROINTESTINAL: No abdominal pain. No nausea, vomiting, or hematemesis; No diarrhea or constipation. No melena or hematochezia.  GENITOURINARY: No dysuria or hematuria, urinary frequency  NEUROLOGICAL: No headaches, memory loss, loss of strength, numbness, or tremors  SKIN: No itching, burning, rashes, or lesions     Vital Signs Last 24 Hrs  T(C): 37.2 (23 Jul 2023 05:45), Max: 37.2 (23 Jul 2023 05:45)  T(F): 98.9 (23 Jul 2023 05:45), Max: 98.9 (23 Jul 2023 05:45)  HR: 75 (23 Jul 2023 05:45) (60 - 82)  BP: 117/62 (23 Jul 2023 05:45) (117/62 - 154/64)  BP(mean): 81 (22 Jul 2023 11:58) (81 - 94)  RR: 17 (23 Jul 2023 05:45) (13 - 22)  SpO2: 95% (23 Jul 2023 05:45) (95% - 99%)    Parameters below as of 23 Jul 2023 05:45  Patient On (Oxygen Delivery Method): room air        PHYSICAL EXAMINATION:  GENERAL: NAD, well built  HEAD:  Atraumatic, Normocephalic  EYES:  conjunctiva and sclera clear  NECK: Supple, No JVD, Normal thyroid  CHEST/LUNG: Clear to auscultation. Clear to percussion bilaterally; No rales, rhonchi, wheezing, or rubs  HEART: Regular rate and rhythm; No murmurs, rubs, or gallops  ABDOMEN: Soft, Nontender, Nondistended; Bowel sounds present  NERVOUS SYSTEM:  Alert & Oriented X3,    EXTREMITIES:  2+ Peripheral Pulses, No clubbing, cyanosis, or edema  SKIN: warm dry                          12.1   9.23  )-----------( 276      ( 23 Jul 2023 08:45 )             38.0     07-22    139  |  104  |  11  ----------------------------<  99  4.0   |  28  |  0.63    Ca    9.0      22 Jul 2023 03:32    TPro  7.2  /  Alb  2.7<L>  /  TBili  0.6  /  DBili  x   /  AST  8<L>  /  ALT  12  /  AlkPhos  69  07-22    LIVER FUNCTIONS - ( 22 Jul 2023 03:32 )  Alb: 2.7 g/dL / Pro: 7.2 g/dL / ALK PHOS: 69 U/L / ALT: 12 U/L DA / AST: 8 U/L / GGT: x               PT/INR - ( 22 Jul 2023 03:32 )   PT: 12.9 sec;   INR: 1.08 ratio         PTT - ( 22 Jul 2023 03:32 )  PTT:22.3 sec    CAPILLARY BLOOD GLUCOSE      RADIOLOGY & ADDITIONAL TESTS:                   PGY-1 Progress Note discussed with attending    PAGER #: [[phone]] TILL 5:00 PM  PLEASE CONTACT ON CALL TEAM:  - On Call Team (Please refer to Hannah) FROM 5:00 PM - 8:30PM  - Nightfloat Team FROM 8:30 -7:30 AM    CHIEF COMPLAINT & BRIEF HOSPITAL COURSE:  Abscess, cellulitis Left thigh. s/p surgical debridement    INTERVAL HPI/OVERNIGHT EVENTS:   No acute events overnight. Pt complains of mild pain in area of debridement.    REVIEW OF SYSTEMS:  CONSTITUTIONAL: No fever, weight loss, or fatigue  RESPIRATORY: No cough, wheezing, chills or hemoptysis; No shortness of breath  CARDIOVASCULAR: +chronic leg swelling. No chest pain, palpitations, dizziness  GASTROINTESTINAL: No abdominal pain. No nausea, vomiting, or hematemesis; No diarrhea or constipation. No melena or hematochezia.  GENITOURINARY: No dysuria or hematuria, urinary frequency  NEUROLOGICAL: No headaches, memory loss, loss of strength, numbness, or tremors  SKIN: +Wound on left thigh s/p debridement. No itching, burning     Vital Signs Last 24 Hrs  T(C): 37.2 (23 Jul 2023 05:45), Max: 37.2 (23 Jul 2023 05:45)  T(F): 98.9 (23 Jul 2023 05:45), Max: 98.9 (23 Jul 2023 05:45)  HR: 75 (23 Jul 2023 05:45) (60 - 82)  BP: 117/62 (23 Jul 2023 05:45) (117/62 - 154/64)  BP(mean): 81 (22 Jul 2023 11:58) (81 - 94)  RR: 17 (23 Jul 2023 05:45) (13 - 22)  SpO2: 95% (23 Jul 2023 05:45) (95% - 99%)    Parameters below as of 23 Jul 2023 05:45  Patient On (Oxygen Delivery Method): room air        PHYSICAL EXAMINATION:  GENERAL: NAD, well built, lying on side in stretcher  HEAD:  Atraumatic, Normocephalic  EYES:  conjunctiva and sclera clear  NECK: Supple, No JVD, Normal thyroid  CHEST/LUNG: Clear to auscultation. Clear to percussion bilaterally; No rales, rhonchi, wheezing, or rubs  HEART: +holosystolic murmur. Regular rate and rhythm; No rubs, or gallops  ABDOMEN: Soft, Nontender, Nondistended; Bowel sounds present  NERVOUS SYSTEM:  Alert & Oriented X3,    EXTREMITIES: +non-pitting edema in b/l LE's. +Cyst on dorsum of left foot.  2+ Peripheral Pulses, No clubbing, cyanosis  SKIN: +dressing intact on left thigh/buttock. No leakage from dressing.                          12.1   9.23  )-----------( 276      ( 23 Jul 2023 08:45 )             38.0     07-22    139  |  104  |  11  ----------------------------<  99  4.0   |  28  |  0.63    Ca    9.0      22 Jul 2023 03:32    TPro  7.2  /  Alb  2.7<L>  /  TBili  0.6  /  DBili  x   /  AST  8<L>  /  ALT  12  /  AlkPhos  69  07-22    LIVER FUNCTIONS - ( 22 Jul 2023 03:32 )  Alb: 2.7 g/dL / Pro: 7.2 g/dL / ALK PHOS: 69 U/L / ALT: 12 U/L DA / AST: 8 U/L / GGT: x               PT/INR - ( 22 Jul 2023 03:32 )   PT: 12.9 sec;   INR: 1.08 ratio         PTT - ( 22 Jul 2023 03:32 )  PTT:22.3 sec    CAPILLARY BLOOD GLUCOSE      RADIOLOGY & ADDITIONAL TESTS:

## 2023-07-23 NOTE — PROGRESS NOTE ADULT - PROBLEM SELECTOR PLAN 1
p/w left posterior upper thight cellulitis and abscess  - vitals: T: 98, HR: 79, BP: 132/75 mmHg, 96% on RA  - CT w/ IV cont LLE: cellulitis post medial proximal and mid left thigh 2.5 x 2.1 x 3  - s/p Vanc in ED  - Surgery consulted, pt for I&D in OR  - Start vancomycin and ceftriaxone  - f/u intra-op wound cultures, follow MRSA swab  - f/u ESR, CRP  - PT Eval   - Tylenol and ketorolac for pain  - ID Dr. Boggs p/w left posterior upper thight cellulitis and abscess  - vitals: T: 98, HR: 79, BP: 132/75 mmHg, 96% on RA  - CT w/ IV cont LLE: cellulitis post medial proximal and mid left thigh 2.5 x 2.1 x 3  - s/p Vanc in ED  - Surgery consulted, pt for I&D in OR  - Start vancomycin and ceftriaxone  - f/u intra-op wound cultures, follow MRSA swab  - 7/23 ESR-52; CRP-104  - PT Eval   - Tylenol and ketorolac for pain  - ID Dr. Boggs

## 2023-07-23 NOTE — PROGRESS NOTE ADULT - PROBLEM SELECTOR PLAN 4
primary ca of endometrium s/p 2 sux resection (2009 &2018)  with chemo and radiotherapy  with metastasis to lung with 2cm nodule s/p JENNIFER lobectomy   pt follows up with Dr. Rose Crowley  pt has new 5 mm lung nodule and cecal mass. Referred to GI for colonoscopy, anastrazole held since May 23. hx hypothyroid on levothyroxine 50mcg  cont home med  f/u TSH

## 2023-07-23 NOTE — PROGRESS NOTE ADULT - PROBLEM SELECTOR PLAN 3
hx hypothyroid on levothyroxine 50mcg  cont home med  f/u TSH -7/23 murmur heard incidentally on physical exam  -Echo ordered

## 2023-07-23 NOTE — PHYSICAL THERAPY INITIAL EVALUATION ADULT - PERTINENT HX OF CURRENT PROBLEM, REHAB EVAL
Pt is an 88 y/o female with PMH of HTN, PAD with left LE stent, endometrial CA with JENNIFER lung mets s/p resection, chemo, and radiotherapy presenting to the hospital with left thigh swelling. Admitted for cellulitis with abscess to left upper thigh, for I&D in OR.

## 2023-07-23 NOTE — PROGRESS NOTE ADULT - ATTENDING COMMENTS
Patient was seen and examined at bedside. Reports pain at the I&D side but manageable with the Ketorolac, Does not want any Opioid.     ICU Vital Signs Last 24 Hrs  T(C): 36.9 (23 Jul 2023 13:50), Max: 37.2 (23 Jul 2023 05:45)  T(F): 98.4 (23 Jul 2023 13:50), Max: 98.9 (23 Jul 2023 05:45)  HR: 64 (23 Jul 2023 13:50) (64 - 75)  BP: 110/64 (23 Jul 2023 13:50) (110/64 - 132/66)  BP(mean): --  ABP: --  ABP(mean): --  RR: 20 (23 Jul 2023 13:50) (17 - 20)  SpO2: 96% (23 Jul 2023 13:50) (95% - 98%)    O2 Parameters below as of 23 Jul 2023 13:50  Patient On (Oxygen Delivery Method): room air      P/E:  NAD  AAOx3, no focal deficit   CTABL  S1S2 WNL  Abd soft, non tender, BS present   BLLE no edema or calf tenderness   Dressing dry    Labs noted   CT scan noted     A/P:  Left post thigh abscess with surrounding cellulitis   H/o endometrial cancer with lung mets s/p resection, chemo and radiation   H/o JENNIFER lobectomy   LE edema chronic   PAD s/p LE sent     Plan:   S/p I&D in OR   Cont Vanc and Ceftriaxone   Wound dressing as per surgery   Tylenol and Ketorolac for pain   Cont Levothyroxine   Patient not on aspirin at home, h/o LE stent   Plan of care was discussed with patient; all questions and concerns were addressed and care was aligned with patient's wishes.

## 2023-07-23 NOTE — CHART NOTE - NSCHARTNOTEFT_GEN_A_CORE
Pt POD 0 s/p I&D L thigh  resting comfortably  no n/v    Vital Signs Last 24 Hrs  T(C): 36.5 (22 Jul 2023 14:10), Max: 36.9 (21 Jul 2023 22:00)  T(F): 97.7 (22 Jul 2023 14:10), Max: 98.5 (22 Jul 2023 08:40)  HR: 60 (22 Jul 2023 14:10) (60 - 82)  BP: 124/62 (22 Jul 2023 14:10) (115/60 - 154/64)  BP(mean): 81 (22 Jul 2023 11:58) (81 - 94)  RR: 18 (22 Jul 2023 14:10) (13 - 22)  SpO2: 96% (22 Jul 2023 14:10) (95% - 99%)    Parameters below as of 22 Jul 2023 14:10  Patient On (Oxygen Delivery Method): room air    L thigh dressing dry, intact    stable post-op
pt see  stable  no active bleeding from the wound  packing change in am

## 2023-07-23 NOTE — PROGRESS NOTE ADULT - PROBLEM SELECTOR PLAN 5
hx b/l LE non pitting edema  on furosemide 40mg PRN primary ca of endometrium s/p 2 sux resection (2009 &2018)  with chemo and radiotherapy  with metastasis to lung with 2cm nodule s/p JENNIFER lobectomy   pt follows up with Dr. Rose Crowley  pt has new 5 mm lung nodule and cecal mass. Referred to GI for colonoscopy, anastrazole held since May 23.

## 2023-07-23 NOTE — PHYSICAL THERAPY INITIAL EVALUATION ADULT - ADDITIONAL COMMENTS
Pt lives alone in an apartment with 3 steps to enter and elevator access.  Pt states she owns a rolling walker and has a HHA for 4hrs a day x 5 days a week.

## 2023-07-23 NOTE — PROGRESS NOTE ADULT - ASSESSMENT
This is an 88 y/o female from home with Pmhx of HTN, PAD with left LE stent( sep 22), endometrial ca with JENNIFER lung mets s/p resection, chemo, and radiotherapy presenting to the hospital with left thigh swelling. Admitted for cellulitis with abscess to left upper thigh, for I&D in OR.      In ED:   vitals: T: 98, HR: 79, BP: 132/75 mmHg, 96% on RA  WBC 11.5  CT w/ IV cont LLE: cellulitis post medial proximal and mid left thigh 2.5 x 2.1 x 3  s/p Vanc

## 2023-07-23 NOTE — PHYSICAL THERAPY INITIAL EVALUATION ADULT - DIAGNOSIS, PT EVAL
Pt presents with impairment in muscle strength, balance and pain affecting her ability to perform transfer activities.

## 2023-07-24 LAB
-  AMPICILLIN/SULBACTAM: SIGNIFICANT CHANGE UP
-  CEFAZOLIN: SIGNIFICANT CHANGE UP
-  CLINDAMYCIN: SIGNIFICANT CHANGE UP
-  DAPTOMYCIN: SIGNIFICANT CHANGE UP
-  ERYTHROMYCIN: SIGNIFICANT CHANGE UP
-  GENTAMICIN: SIGNIFICANT CHANGE UP
-  LINEZOLID: SIGNIFICANT CHANGE UP
-  OXACILLIN: SIGNIFICANT CHANGE UP
-  PENICILLIN: SIGNIFICANT CHANGE UP
-  RIFAMPIN: SIGNIFICANT CHANGE UP
-  TETRACYCLINE: SIGNIFICANT CHANGE UP
-  TRIMETHOPRIM/SULFAMETHOXAZOLE: SIGNIFICANT CHANGE UP
-  VANCOMYCIN: SIGNIFICANT CHANGE UP
ANION GAP SERPL CALC-SCNC: 5 MMOL/L — SIGNIFICANT CHANGE UP (ref 5–17)
BUN SERPL-MCNC: 16 MG/DL — SIGNIFICANT CHANGE UP (ref 7–18)
CALCIUM SERPL-MCNC: 8.8 MG/DL — SIGNIFICANT CHANGE UP (ref 8.4–10.5)
CHLORIDE SERPL-SCNC: 110 MMOL/L — HIGH (ref 96–108)
CO2 SERPL-SCNC: 27 MMOL/L — SIGNIFICANT CHANGE UP (ref 22–31)
CREAT SERPL-MCNC: 0.55 MG/DL — SIGNIFICANT CHANGE UP (ref 0.5–1.3)
EGFR: 90 ML/MIN/1.73M2 — SIGNIFICANT CHANGE UP
GLUCOSE SERPL-MCNC: 91 MG/DL — SIGNIFICANT CHANGE UP (ref 70–99)
HCT VFR BLD CALC: 34.7 % — SIGNIFICANT CHANGE UP (ref 34.5–45)
HGB BLD-MCNC: 11 G/DL — LOW (ref 11.5–15.5)
MAGNESIUM SERPL-MCNC: 2.5 MG/DL — SIGNIFICANT CHANGE UP (ref 1.6–2.6)
MCHC RBC-ENTMCNC: 29.8 PG — SIGNIFICANT CHANGE UP (ref 27–34)
MCHC RBC-ENTMCNC: 31.7 GM/DL — LOW (ref 32–36)
MCV RBC AUTO: 94 FL — SIGNIFICANT CHANGE UP (ref 80–100)
METHOD TYPE: SIGNIFICANT CHANGE UP
NRBC # BLD: 0 /100 WBCS — SIGNIFICANT CHANGE UP (ref 0–0)
PHOSPHATE SERPL-MCNC: 3.5 MG/DL — SIGNIFICANT CHANGE UP (ref 2.5–4.5)
PLATELET # BLD AUTO: 245 K/UL — SIGNIFICANT CHANGE UP (ref 150–400)
POTASSIUM SERPL-MCNC: 4.2 MMOL/L — SIGNIFICANT CHANGE UP (ref 3.5–5.3)
POTASSIUM SERPL-SCNC: 4.2 MMOL/L — SIGNIFICANT CHANGE UP (ref 3.5–5.3)
RBC # BLD: 3.69 M/UL — LOW (ref 3.8–5.2)
RBC # FLD: 12.7 % — SIGNIFICANT CHANGE UP (ref 10.3–14.5)
SODIUM SERPL-SCNC: 142 MMOL/L — SIGNIFICANT CHANGE UP (ref 135–145)
VANCOMYCIN TROUGH SERPL-MCNC: 13.6 UG/ML — SIGNIFICANT CHANGE UP (ref 10–20)
WBC # BLD: 7.46 K/UL — SIGNIFICANT CHANGE UP (ref 3.8–10.5)
WBC # FLD AUTO: 7.46 K/UL — SIGNIFICANT CHANGE UP (ref 3.8–10.5)

## 2023-07-24 PROCEDURE — 99233 SBSQ HOSP IP/OBS HIGH 50: CPT | Mod: GC

## 2023-07-24 PROCEDURE — 99232 SBSQ HOSP IP/OBS MODERATE 35: CPT | Mod: GC

## 2023-07-24 PROCEDURE — 93306 TTE W/DOPPLER COMPLETE: CPT | Mod: 26

## 2023-07-24 RX ORDER — SENNA PLUS 8.6 MG/1
2 TABLET ORAL AT BEDTIME
Refills: 0 | Status: DISCONTINUED | OUTPATIENT
Start: 2023-07-24 | End: 2023-07-31

## 2023-07-24 RX ORDER — POLYETHYLENE GLYCOL 3350 17 G/17G
17 POWDER, FOR SOLUTION ORAL DAILY
Refills: 0 | Status: DISCONTINUED | OUTPATIENT
Start: 2023-07-24 | End: 2023-07-31

## 2023-07-24 RX ADMIN — ENOXAPARIN SODIUM 40 MILLIGRAM(S): 100 INJECTION SUBCUTANEOUS at 12:19

## 2023-07-24 RX ADMIN — Medication 166.67 MILLIGRAM(S): at 19:26

## 2023-07-24 RX ADMIN — Medication 15 MILLIGRAM(S): at 03:45

## 2023-07-24 RX ADMIN — Medication 650 MILLIGRAM(S): at 00:59

## 2023-07-24 RX ADMIN — Medication 166.67 MILLIGRAM(S): at 08:14

## 2023-07-24 RX ADMIN — Medication 650 MILLIGRAM(S): at 19:26

## 2023-07-24 RX ADMIN — CEFTRIAXONE 100 MILLIGRAM(S): 500 INJECTION, POWDER, FOR SOLUTION INTRAMUSCULAR; INTRAVENOUS at 12:20

## 2023-07-24 RX ADMIN — SENNA PLUS 2 TABLET(S): 8.6 TABLET ORAL at 21:56

## 2023-07-24 RX ADMIN — Medication 15 MILLIGRAM(S): at 04:10

## 2023-07-24 RX ADMIN — POLYETHYLENE GLYCOL 3350 17 GRAM(S): 17 POWDER, FOR SOLUTION ORAL at 19:26

## 2023-07-24 RX ADMIN — Medication 650 MILLIGRAM(S): at 08:13

## 2023-07-24 RX ADMIN — Medication 650 MILLIGRAM(S): at 09:00

## 2023-07-24 NOTE — PROGRESS NOTE ADULT - PROBLEM SELECTOR PLAN 1
p/w left posterior upper thight cellulitis and abscess  CT w/ IV cont LLE: cellulitis post medial proximal and mid left thigh 2.5 x 2.1 x 3    I&D (07/22/23)    - s/p Vanc in ED  - Per Dr. Boggs- c/w vancomycin and ceftriaxone  - intra-op wound cultures: staph aureus, follow MRSA swab  - 7/23 ESR-52; CRP-104  - PT Eval   - Tylenol and ketorolac for pain p/w left posterior upper thight cellulitis and abscess  CT w/ IV cont LLE: cellulitis post medial proximal and mid left thigh 2.5 x 2.1 x 3    I&D (07/22/23)    - s/p Vanc in ED  - Per Dr. Boggs- c/w vancomycin and ceftriaxone  - intra-op wound cultures: staph aureus, follow MRSA swab  - 7/23 ESR-52; CRP-104  - PT recom'd home pt  - Tylenol and ketorolac for pain

## 2023-07-24 NOTE — PROGRESS NOTE ADULT - SUBJECTIVE AND OBJECTIVE BOX
PGY-1 Progress Note discussed with attending      INTERVAL HPI/OVERNIGHT EVENTS: Pt. seen and examined at bedside. Reports difficulty sleeping because of pain and drainage from left posterior thigh wound s/p debridement, day 2. Surgical team changed wound site around 07:30. Pt. also notes right ear pain, saying she has difficulty hearing, ear canal feels tight, and pain in mastoid region.      REVIEW OF SYSTEMS:  CONSTITUTIONAL: No fever, weight loss, or fatigue  HEENT: (+)Rt. ear pain and hearing diffculty  RESPIRATORY: No cough, wheezing, chills or hemoptysis; No shortness of breath  CARDIOVASCULAR: +chronic leg swelling. No chest pain, palpitations, dizziness  GASTROINTESTINAL: No abdominal pain. No nausea, vomiting, or hematemesis; No diarrhea or constipation. No melena or hematochezia.  GENITOURINARY: No dysuria or hematuria, urinary frequency  NEUROLOGICAL: No headaches, memory loss, loss of strength, numbness, or tremors  SKIN: +Wound on left thigh s/p debridement. No itching, burning    MEDICATIONS  (STANDING):  cefTRIAXone   IVPB      cefTRIAXone   IVPB 1000 milliGRAM(s) IV Intermittent every 24 hours  enoxaparin Injectable 40 milliGRAM(s) SubCutaneous every 24 hours  levothyroxine 50 MICROGram(s) Oral daily  vancomycin  IVPB 1250 milliGRAM(s) IV Intermittent every 12 hours    MEDICATIONS  (PRN):  acetaminophen     Tablet .. 650 milliGRAM(s) Oral every 6 hours PRN Temp greater or equal to 38C (100.4F), Mild Pain (1 - 3)      Vital Signs Last 24 Hrs  T(C): 36.8 (24 Jul 2023 05:20), Max: 36.9 (23 Jul 2023 13:50)  T(F): 98.2 (24 Jul 2023 05:20), Max: 98.4 (23 Jul 2023 13:50)  HR: 58 (24 Jul 2023 05:20) (58 - 72)  BP: 126/53 (24 Jul 2023 05:20) (109/56 - 132/66)  BP(mean): --  RR: 16 (24 Jul 2023 05:20) (16 - 20)  SpO2: 95% (24 Jul 2023 05:20) (95% - 96%)    Parameters below as of 24 Jul 2023 05:20  Patient On (Oxygen Delivery Method): room air        PHYSICAL EXAMINATION:  GENERAL: NAD, well built,   HEAD:  Atraumatic, Normocephalic  EYES:  conjunctiva and sclera clear  Ear: (+) pain on palpation right mastoid region  NECK: Supple, No JVD, Normal thyroid  CHEST/LUNG: Clear to auscultation. Clear to percussion bilaterally; No rales, rhonchi, wheezing, or rubs  HEART: +holosystolic murmur. Regular rate and rhythm; No rubs, or gallops  ABDOMEN: (+) Hernia LLQ. Soft, Nontender, Nondistended; Bowel sounds present  NERVOUS SYSTEM:  Alert & Oriented X3,    EXTREMITIES: +non-pitting edema in b/l LE's. +Cyst on dorsum of left foot.  2+ Peripheral Pulses, No clubbing, cyanosis  SKIN: +dressing intact on left thigh/buttock. Little drainage from dressing.                          11.0   7.46  )-----------( 245      ( 24 Jul 2023 06:10 )             34.7     07-24    142  |  110<H>  |  16  ----------------------------<  91  4.2   |  27  |  0.55    Ca    8.8      24 Jul 2023 06:10  Phos  3.5     07-24  Mg     2.5     07-24                I&O's Summary        Culture - Surgical Swab (collected 22 Jul 2023 11:58)  Source: .Surgical Swab left leg abscess  Preliminary Report (23 Jul 2023 18:09):    Numerous Staphylococcus aureus        CAPILLARY BLOOD GLUCOSE      RADIOLOGY & ADDITIONAL TESTS:                       PGY-1 Progress Note discussed with attending      INTERVAL HPI/OVERNIGHT EVENTS: Pt. seen and examined at bedside. Reports difficulty sleeping because of pain and drainage from left posterior thigh wound s/p I&D, day 2. Surgical team changed wound site around 07:30. Pt. also notes right ear pain, saying she has difficulty hearing, ear canal feels tight, and pain in mastoid region.      REVIEW OF SYSTEMS:  CONSTITUTIONAL: No fever, weight loss, or fatigue  HEENT: (+)Rt. ear pain and hearing diffculty  RESPIRATORY: No cough, wheezing, chills or hemoptysis; No shortness of breath  CARDIOVASCULAR: +chronic leg swelling. No chest pain, palpitations, dizziness  GASTROINTESTINAL: No abdominal pain. No nausea, vomiting, or hematemesis; No diarrhea or constipation. No melena or hematochezia.  GENITOURINARY: No dysuria or hematuria, urinary frequency  NEUROLOGICAL: No headaches, memory loss, loss of strength, numbness, or tremors  SKIN: +Wound on left thigh s/p debridement. No itching, burning    MEDICATIONS  (STANDING):  cefTRIAXone   IVPB      cefTRIAXone   IVPB 1000 milliGRAM(s) IV Intermittent every 24 hours  enoxaparin Injectable 40 milliGRAM(s) SubCutaneous every 24 hours  levothyroxine 50 MICROGram(s) Oral daily  vancomycin  IVPB 1250 milliGRAM(s) IV Intermittent every 12 hours    MEDICATIONS  (PRN):  acetaminophen     Tablet .. 650 milliGRAM(s) Oral every 6 hours PRN Temp greater or equal to 38C (100.4F), Mild Pain (1 - 3)      Vital Signs Last 24 Hrs  T(C): 36.8 (24 Jul 2023 05:20), Max: 36.9 (23 Jul 2023 13:50)  T(F): 98.2 (24 Jul 2023 05:20), Max: 98.4 (23 Jul 2023 13:50)  HR: 58 (24 Jul 2023 05:20) (58 - 72)  BP: 126/53 (24 Jul 2023 05:20) (109/56 - 132/66)  BP(mean): --  RR: 16 (24 Jul 2023 05:20) (16 - 20)  SpO2: 95% (24 Jul 2023 05:20) (95% - 96%)    Parameters below as of 24 Jul 2023 05:20  Patient On (Oxygen Delivery Method): room air        PHYSICAL EXAMINATION:  GENERAL: NAD, well built,   HEAD:  Atraumatic, Normocephalic  EYES:  conjunctiva and sclera clear  Ear: (+) pain on palpation right mastoid region  NECK: Supple, No JVD, Normal thyroid  CHEST/LUNG: Clear to auscultation. Clear to percussion bilaterally; No rales, rhonchi, wheezing, or rubs  HEART: +holosystolic murmur. Regular rate and rhythm; No rubs, or gallops  ABDOMEN: (+) Hernia LLQ. Soft, Nontender, Nondistended; Bowel sounds present  NERVOUS SYSTEM:  Alert & Oriented X3,    EXTREMITIES: +non-pitting edema in b/l LE's. +Cyst on dorsum of left foot.  2+ Peripheral Pulses, No clubbing, cyanosis  SKIN: +dressing intact on left thigh/buttock. Little drainage from dressing.                          11.0   7.46  )-----------( 245      ( 24 Jul 2023 06:10 )             34.7     07-24    142  |  110<H>  |  16  ----------------------------<  91  4.2   |  27  |  0.55    Ca    8.8      24 Jul 2023 06:10  Phos  3.5     07-24  Mg     2.5     07-24                I&O's Summary        Culture - Surgical Swab (collected 22 Jul 2023 11:58)  Source: .Surgical Swab left leg abscess  Preliminary Report (23 Jul 2023 18:09):    Numerous Staphylococcus aureus        CAPILLARY BLOOD GLUCOSE      RADIOLOGY & ADDITIONAL TESTS:                       PGY-1 Progress Note discussed with attending      INTERVAL HPI/OVERNIGHT EVENTS: Pt. seen and examined at bedside. Reports difficulty sleeping because of pain and drainage from left posterior thigh wound s/p I&D, day 2. Surgical team changed wound site around 07:30. Pt. also notes right ear pain, saying she has difficulty hearing, ear canal feels tight, and pain in mastoid region. Has not had bowel movement since Saturday.      REVIEW OF SYSTEMS:  CONSTITUTIONAL: No fever, weight loss, or fatigue  HEENT: (+) Rt. ear pain and hearing diffculty  RESPIRATORY: No cough, wheezing, chills or hemoptysis; No shortness of breath  CARDIOVASCULAR: (+) chronic leg swelling. No chest pain, palpitations, dizziness  GASTROINTESTINAL: No abdominal pain. No nausea, vomiting, or hematemesis; No diarrhea or constipation. No melena or hematochezia.  GENITOURINARY: No dysuria or hematuria, urinary frequency  NEUROLOGICAL: No headaches, memory loss, loss of strength, numbness, or tremors  SKIN: (+) Wound on left thigh s/p debridement. No itching, burning    MEDICATIONS  (STANDING):  cefTRIAXone   IVPB      cefTRIAXone   IVPB 1000 milliGRAM(s) IV Intermittent every 24 hours  enoxaparin Injectable 40 milliGRAM(s) SubCutaneous every 24 hours  levothyroxine 50 MICROGram(s) Oral daily  vancomycin  IVPB 1250 milliGRAM(s) IV Intermittent every 12 hours    MEDICATIONS  (PRN):  acetaminophen     Tablet .. 650 milliGRAM(s) Oral every 6 hours PRN Temp greater or equal to 38C (100.4F), Mild Pain (1 - 3)      Vital Signs Last 24 Hrs  T(C): 36.8 (24 Jul 2023 05:20), Max: 36.9 (23 Jul 2023 13:50)  T(F): 98.2 (24 Jul 2023 05:20), Max: 98.4 (23 Jul 2023 13:50)  HR: 58 (24 Jul 2023 05:20) (58 - 72)  BP: 126/53 (24 Jul 2023 05:20) (109/56 - 132/66)  BP(mean): --  RR: 16 (24 Jul 2023 05:20) (16 - 20)  SpO2: 95% (24 Jul 2023 05:20) (95% - 96%)    Parameters below as of 24 Jul 2023 05:20  Patient On (Oxygen Delivery Method): room air        PHYSICAL EXAMINATION:  GENERAL: NAD, well built,   HEAD:  Atraumatic, Normocephalic  EYES:  conjunctiva and sclera clear  Ear: (+) pain on palpation right mastoid region  NECK: Supple, No JVD, Normal thyroid  CHEST/LUNG: Clear to auscultation. Clear to percussion bilaterally; No rales, rhonchi, wheezing, or rubs  HEART: (+) holosystolic murmur. Regular rate and rhythm; No rubs, or gallops  ABDOMEN: (+) Hernia LLQ. Soft, Nontender, Nondistended; Bowel sounds present  NERVOUS SYSTEM:  Alert & Oriented X3,    EXTREMITIES: (+) non-pitting edema in b/l LE's. +Cyst on dorsum of left foot.  2+ Peripheral Pulses, No clubbing, cyanosis  SKIN: (+) dressing intact on left thigh/buttock. Little drainage from dressing.                          11.0   7.46  )-----------( 245      ( 24 Jul 2023 06:10 )             34.7     07-24    142  |  110<H>  |  16  ----------------------------<  91  4.2   |  27  |  0.55    Ca    8.8      24 Jul 2023 06:10  Phos  3.5     07-24  Mg     2.5     07-24                I&O's Summary        Culture - Surgical Swab (collected 22 Jul 2023 11:58)  Source: .Surgical Swab left leg abscess  Preliminary Report (23 Jul 2023 18:09):    Numerous Staphylococcus aureus        CAPILLARY BLOOD GLUCOSE      RADIOLOGY & ADDITIONAL TESTS:                   PGY-1 Progress Note discussed with attending      INTERVAL HPI/OVERNIGHT EVENTS: No overnight events. Pt. seen and examined at bedside. Reports difficulty sleeping because of pain and drainage from left posterior thigh wound s/p I&D, day 2. Surgical team changed wound site around 07:30. Pt. also notes right ear pain, saying she has difficulty hearing, ear canal feels tight, and pain in mastoid region. Has not had bowel movement since Saturday.      REVIEW OF SYSTEMS:  CONSTITUTIONAL: No fever, weight loss, or fatigue  HEENT: (+) Rt. ear pain and hearing diffculty  RESPIRATORY: No cough, wheezing, chills or hemoptysis; No shortness of breath  CARDIOVASCULAR: (+) chronic leg swelling. No chest pain, palpitations, dizziness  GASTROINTESTINAL: No abdominal pain. No nausea, vomiting, or hematemesis; No diarrhea or constipation. No melena or hematochezia.  GENITOURINARY: No dysuria or hematuria, urinary frequency  NEUROLOGICAL: No headaches, memory loss, loss of strength, numbness, or tremors  SKIN: (+) Wound on left thigh s/p debridement +dressing . No itching, burning    MEDICATIONS  (STANDING):  cefTRIAXone   IVPB      cefTRIAXone   IVPB 1000 milliGRAM(s) IV Intermittent every 24 hours  enoxaparin Injectable 40 milliGRAM(s) SubCutaneous every 24 hours  levothyroxine 50 MICROGram(s) Oral daily  vancomycin  IVPB 1250 milliGRAM(s) IV Intermittent every 12 hours    MEDICATIONS  (PRN):  acetaminophen     Tablet .. 650 milliGRAM(s) Oral every 6 hours PRN Temp greater or equal to 38C (100.4F), Mild Pain (1 - 3)      Vital Signs Last 24 Hrs  T(C): 36.8 (24 Jul 2023 05:20), Max: 36.9 (23 Jul 2023 13:50)  T(F): 98.2 (24 Jul 2023 05:20), Max: 98.4 (23 Jul 2023 13:50)  HR: 58 (24 Jul 2023 05:20) (58 - 72)  BP: 126/53 (24 Jul 2023 05:20) (109/56 - 132/66)  BP(mean): --  RR: 16 (24 Jul 2023 05:20) (16 - 20)  SpO2: 95% (24 Jul 2023 05:20) (95% - 96%)    Parameters below as of 24 Jul 2023 05:20  Patient On (Oxygen Delivery Method): room air        PHYSICAL EXAMINATION:  GENERAL: NAD, well built,   HEAD:  Atraumatic, Normocephalic  EYES:  conjunctiva and sclera clear  Ear: (+) pain on palpation right mastoid region  NECK: Supple, No JVD, Normal thyroid  CHEST/LUNG: Clear to auscultation. Clear to percussion bilaterally; No rales, rhonchi, wheezing, or rubs  HEART: (+) holosystolic murmur. Regular rate and rhythm; No rubs, or gallops  ABDOMEN: (+) Hernia LLQ. Soft, Nontender, Nondistended; Bowel sounds present  NERVOUS SYSTEM:  Alert & Oriented X3,    EXTREMITIES: (+) non-pitting edema in b/l LE's. +Cyst on dorsum of left foot. 2+ Peripheral Pulses, No clubbing, cyanosis  SKIN: (+) dressing intact on left thigh/buttock. Little drainage from dressing.                          11.0   7.46  )-----------( 245      ( 24 Jul 2023 06:10 )             34.7     07-24    142  |  110<H>  |  16  ----------------------------<  91  4.2   |  27  |  0.55    Ca    8.8      24 Jul 2023 06:10  Phos  3.5     07-24  Mg     2.5     07-24                I&O's Summary        Culture - Surgical Swab (collected 22 Jul 2023 11:58)  Source: .Surgical Swab left leg abscess  Preliminary Report (23 Jul 2023 18:09):    Numerous Staphylococcus aureus        CAPILLARY BLOOD GLUCOSE      RADIOLOGY & ADDITIONAL TESTS:

## 2023-07-24 NOTE — PROGRESS NOTE ADULT - ATTENDING COMMENTS
Patient was seen and examined at bedside. Reports pain is better today    ICU Vital Signs Last 24 Hrs  T(C): 36.3 (24 Jul 2023 11:33), Max: 36.8 (24 Jul 2023 05:20)  T(F): 97.3 (24 Jul 2023 11:33), Max: 98.2 (24 Jul 2023 05:20)  HR: 67 (24 Jul 2023 11:33) (58 - 67)  BP: 107/65 (24 Jul 2023 11:33) (107/65 - 126/53)  BP(mean): --  ABP: --  ABP(mean): --  RR: 18 (24 Jul 2023 11:33) (16 - 20)  SpO2: 96% (24 Jul 2023 11:33) (95% - 96%)    O2 Parameters below as of 24 Jul 2023 11:33  Patient On (Oxygen Delivery Method): room air      P/E:  NAD  AAOx3, no focal deficit   CTABL  S1S2 WNL, + murmur  Abd soft, non tender, BS present   BLLE no edema or calf tenderness   Dressing mildly soaked     Labs noted   CT scan noted     A/P:  Left post thigh abscess with surrounding cellulitis   H/o endometrial cancer with lung mets s/p resection, chemo and radiation   H/o JENNIFER lobectomy   LE edema chronic   PAD s/p LE sent     Plan:   S/p I&D   Dressing done by surgery today  Cont Vanc and Ceftriaxone, ID follow up for titration of antibiotics  Tylenol and Ketorolac for pain   Cont Levothyroxine   Pending TTE for murmur   Plan of care was discussed with patient; all questions and concerns were addressed and care was aligned with patient's wishes.

## 2023-07-24 NOTE — PROGRESS NOTE ADULT - ASSESSMENT
This is an 88 y/o female from home with Pmhx of HTN, PAD with left LE stent( sep 22), endometrial ca with JENNIFER lung mets s/p resection, chemo, and radiotherapy presenting to the hospital with left thigh swelling. Admitted for cellulitis with abscess to left upper thigh, for I&D in OR.     This is an 86 y/o female from home with Pmhx of HTN, PAD with left LE stent(sep 22), endometrial ca with JENNIFER lung mets s/p resection, chemo, and radiotherapy presenting to the hospital with left thigh swelling. Admitted for cellulitis with abscess to left upper thigh, for I&D in OR on 7/22.

## 2023-07-24 NOTE — PROGRESS NOTE ADULT - SUBJECTIVE AND OBJECTIVE BOX
Pt s- new complaints  ICU Vital Signs Last 24 Hrs  T(C): 36.3 (24 Jul 2023 11:33), Max: 36.8 (24 Jul 2023 05:20)  T(F): 97.3 (24 Jul 2023 11:33), Max: 98.2 (24 Jul 2023 05:20)  HR: 67 (24 Jul 2023 11:33) (58 - 67)  BP: 107/65 (24 Jul 2023 11:33) (107/65 - 126/53)  BP(mean): --  ABP: --  ABP(mean): --  RR: 18 (24 Jul 2023 11:33) (16 - 20)  SpO2: 96% (24 Jul 2023 11:33) (95% - 96%)    O2 Parameters below as of 24 Jul 2023 11:33  Patient On (Oxygen Delivery Method): room air        Alert nad  left post thigh wound: np bleed  no purulence no crepitus    packing replaced with damp sterile gauze                          11.0   7.46  )-----------( 245      ( 24 Jul 2023 06:10 )             34.7

## 2023-07-25 ENCOUNTER — TRANSCRIPTION ENCOUNTER (OUTPATIENT)
Age: 88
End: 2023-07-25

## 2023-07-25 LAB
ANION GAP SERPL CALC-SCNC: 5 MMOL/L — SIGNIFICANT CHANGE UP (ref 5–17)
BUN SERPL-MCNC: 13 MG/DL — SIGNIFICANT CHANGE UP (ref 7–18)
CALCIUM SERPL-MCNC: 8.6 MG/DL — SIGNIFICANT CHANGE UP (ref 8.4–10.5)
CHLORIDE SERPL-SCNC: 110 MMOL/L — HIGH (ref 96–108)
CO2 SERPL-SCNC: 27 MMOL/L — SIGNIFICANT CHANGE UP (ref 22–31)
CREAT SERPL-MCNC: 0.53 MG/DL — SIGNIFICANT CHANGE UP (ref 0.5–1.3)
EGFR: 91 ML/MIN/1.73M2 — SIGNIFICANT CHANGE UP
GLUCOSE SERPL-MCNC: 107 MG/DL — HIGH (ref 70–99)
HCT VFR BLD CALC: 34.3 % — LOW (ref 34.5–45)
HGB BLD-MCNC: 10.9 G/DL — LOW (ref 11.5–15.5)
MAGNESIUM SERPL-MCNC: 2.5 MG/DL — SIGNIFICANT CHANGE UP (ref 1.6–2.6)
MCHC RBC-ENTMCNC: 30.3 PG — SIGNIFICANT CHANGE UP (ref 27–34)
MCHC RBC-ENTMCNC: 31.8 GM/DL — LOW (ref 32–36)
MCV RBC AUTO: 95.3 FL — SIGNIFICANT CHANGE UP (ref 80–100)
NRBC # BLD: 0 /100 WBCS — SIGNIFICANT CHANGE UP (ref 0–0)
PHOSPHATE SERPL-MCNC: 3.2 MG/DL — SIGNIFICANT CHANGE UP (ref 2.5–4.5)
PLATELET # BLD AUTO: 264 K/UL — SIGNIFICANT CHANGE UP (ref 150–400)
POTASSIUM SERPL-MCNC: 4.1 MMOL/L — SIGNIFICANT CHANGE UP (ref 3.5–5.3)
POTASSIUM SERPL-SCNC: 4.1 MMOL/L — SIGNIFICANT CHANGE UP (ref 3.5–5.3)
RBC # BLD: 3.6 M/UL — LOW (ref 3.8–5.2)
RBC # FLD: 12.5 % — SIGNIFICANT CHANGE UP (ref 10.3–14.5)
SODIUM SERPL-SCNC: 142 MMOL/L — SIGNIFICANT CHANGE UP (ref 135–145)
VANCOMYCIN FLD-MCNC: 13.3 UG/ML — SIGNIFICANT CHANGE UP
WBC # BLD: 6.32 K/UL — SIGNIFICANT CHANGE UP (ref 3.8–10.5)
WBC # FLD AUTO: 6.32 K/UL — SIGNIFICANT CHANGE UP (ref 3.8–10.5)

## 2023-07-25 PROCEDURE — 99233 SBSQ HOSP IP/OBS HIGH 50: CPT | Mod: GC

## 2023-07-25 RX ORDER — CHLORHEXIDINE GLUCONATE 213 G/1000ML
1 SOLUTION TOPICAL
Refills: 0 | Status: DISCONTINUED | OUTPATIENT
Start: 2023-07-25 | End: 2023-07-31

## 2023-07-25 RX ORDER — VANCOMYCIN HCL 1 G
1250 VIAL (EA) INTRAVENOUS EVERY 12 HOURS
Refills: 0 | Status: DISCONTINUED | OUTPATIENT
Start: 2023-07-25 | End: 2023-07-27

## 2023-07-25 RX ORDER — KETOROLAC TROMETHAMINE 30 MG/ML
15 SYRINGE (ML) INJECTION ONCE
Refills: 0 | Status: DISCONTINUED | OUTPATIENT
Start: 2023-07-25 | End: 2023-07-25

## 2023-07-25 RX ADMIN — Medication 50 MICROGRAM(S): at 05:10

## 2023-07-25 RX ADMIN — Medication 650 MILLIGRAM(S): at 07:53

## 2023-07-25 RX ADMIN — SENNA PLUS 2 TABLET(S): 8.6 TABLET ORAL at 21:23

## 2023-07-25 RX ADMIN — Medication 650 MILLIGRAM(S): at 05:08

## 2023-07-25 RX ADMIN — ENOXAPARIN SODIUM 40 MILLIGRAM(S): 100 INJECTION SUBCUTANEOUS at 08:37

## 2023-07-25 RX ADMIN — Medication 166.67 MILLIGRAM(S): at 18:53

## 2023-07-25 RX ADMIN — Medication 100 MILLIGRAM(S): at 17:44

## 2023-07-25 RX ADMIN — Medication 15 MILLIGRAM(S): at 22:21

## 2023-07-25 RX ADMIN — CHLORHEXIDINE GLUCONATE 1 APPLICATION(S): 213 SOLUTION TOPICAL at 11:28

## 2023-07-25 RX ADMIN — Medication 15 MILLIGRAM(S): at 22:06

## 2023-07-25 RX ADMIN — POLYETHYLENE GLYCOL 3350 17 GRAM(S): 17 POWDER, FOR SOLUTION ORAL at 11:26

## 2023-07-25 RX ADMIN — Medication 166.67 MILLIGRAM(S): at 05:09

## 2023-07-25 NOTE — DISCHARGE NOTE PROVIDER - NSDCMRMEDTOKEN_GEN_ALL_CORE_FT
furosemide 40 mg oral tablet: 1 tab(s) orally once a day as needed for leg swelling  levothyroxine 50 mcg (0.05 mg) oral tablet: 1 tab(s) orally once a day   doxycycline monohydrate 50 mg oral capsule: 2 cap(s) orally every 12 hours  furosemide 40 mg oral tablet: 1 tab(s) orally once a day as needed for leg swelling  levothyroxine 50 mcg (0.05 mg) oral tablet: 1 tab(s) orally once a day  WOUND CARE INSTRUCTIONS: left posterior thigh wound with 7cm x 2cm x 5cm wound, please change packing daily with sterile wet to dry kerlix, cover with dry gauze and medipore. Pt may shower prior to dressing changes and irrigate wound.   furosemide 40 mg oral tablet: 1 tab(s) orally once a day as needed for leg swelling  levothyroxine 50 mcg (0.05 mg) oral tablet: 1 tab(s) orally once a day  Monodox 100 mg oral capsule: 1 cap(s) orally 2 times a day  WOUND CARE INSTRUCTIONS: left posterior thigh wound with 7cm x 2cm x 5cm wound, please change packing daily with sterile wet to dry kerlix, cover with dry gauze and medipore. Pt may shower prior to dressing changes and irrigate wound.   aspirin 81 mg oral delayed release tablet: 1 tab(s) orally once a day  furosemide 40 mg oral tablet: 1 tab(s) orally once a day as needed for leg swelling  levothyroxine 50 mcg (0.05 mg) oral tablet: 1 tab(s) orally once a day  Monodox 100 mg oral capsule: 1 cap(s) orally 2 times a day   aspirin 81 mg oral delayed release tablet: 1 tab(s) orally once a day  furosemide 40 mg oral tablet: 1 tab(s) orally once a day as needed for leg swelling  levothyroxine 50 mcg (0.05 mg) oral tablet: 1 tab(s) orally once a day  Monodox 100 mg oral capsule: 1 cap(s) orally 2 times a day  WOUND CARE INSTRUCTIONS: left posterior thigh wound with 7cm x 2cm x 5cm wound, please change packing daily with sterile wet to dry kerlix, cover with dry gauze and medipore. Pt may shower prior to dressing changes and irrigate wound.

## 2023-07-25 NOTE — DISCHARGE NOTE PROVIDER - NPI NUMBER (FOR [employer] USE ONLY) :
[[id number]] [[id number]],[[id number]],[[id number]] [[id number]],[[id number]],[[id number]],[UNKNOWN]

## 2023-07-25 NOTE — PROGRESS NOTE ADULT - PROBLEM SELECTOR PLAN 3
primary ca of endometrium s/p 2 sux resection (2009 &2018)  with chemo and radiotherapy  with metastasis to lung with 2cm nodule s/p JENNIFER lobectomy   pt follows up with Dr. Rose Crowley  pt has new 5 mm lung nodule and cecal mass.   Referred to out patient GI (Dr. Perry) for colonoscopy.  anastrazole held since May 23.

## 2023-07-25 NOTE — PROGRESS NOTE ADULT - ASSESSMENT
This is an 88 y/o female from home with Pmhx of HTN, PAD with left LE stent(sep 22), endometrial ca with JENNIFER lung mets s/p resection, chemo, and radiotherapy presenting to the hospital with left thigh swelling. Admitted for cellulitis with abscess to left upper thigh, for I&D in OR on 7/22.

## 2023-07-25 NOTE — PROGRESS NOTE ADULT - SUBJECTIVE AND OBJECTIVE BOX
PGY-1 Progress Note discussed with attending        INTERVAL HPI/OVERNIGHT EVENTS: Pt. seen and examined bedside. Pt. reports improvement from previous night, no drainage from wound site and bleeding, and slept well. States right ear pain is better as well, though still doesn't feel right. Dressing changed after inspection.      REVIEW OF SYSTEMS:  CONSTITUTIONAL: No fever, weight loss, or fatigue  HEENT: (+) Rt. ear pain and hearing diffculty  RESPIRATORY: No cough, wheezing, chills or hemoptysis; No shortness of breath  CARDIOVASCULAR: (+) chronic leg swelling. No chest pain, palpitations, dizziness  GASTROINTESTINAL: No abdominal pain. No nausea, vomiting, or hematemesis; No diarrhea or constipation. No melena or hematochezia.  GENITOURINARY: No dysuria or hematuria, urinary frequency  NEUROLOGICAL: No headaches, memory loss, loss of strength, numbness, or tremors  SKIN: (+) Wound on left thigh s/p debridement +dressing . No itching, burning    MEDICATIONS  (STANDING):  chlorhexidine 2% Cloths 1 Application(s) Topical <User Schedule>  doxycycline monohydrate Capsule 100 milliGRAM(s) Oral every 12 hours  enoxaparin Injectable 40 milliGRAM(s) SubCutaneous every 24 hours  levothyroxine 50 MICROGram(s) Oral daily  polyethylene glycol 3350 17 Gram(s) Oral daily  senna 2 Tablet(s) Oral at bedtime    MEDICATIONS  (PRN):  acetaminophen     Tablet .. 650 milliGRAM(s) Oral every 6 hours PRN Temp greater or equal to 38C (100.4F), Mild Pain (1 - 3)      Vital Signs Last 24 Hrs  T(C): 36.3 (25 Jul 2023 13:21), Max: 36.6 (24 Jul 2023 20:22)  T(F): 97.4 (25 Jul 2023 13:21), Max: 97.8 (24 Jul 2023 20:22)  HR: 64 (25 Jul 2023 13:21) (59 - 75)  BP: 132/67 (25 Jul 2023 13:21) (132/67 - 134/65)  BP(mean): --  RR: 20 (25 Jul 2023 13:21) (16 - 20)  SpO2: 98% (25 Jul 2023 13:21) (94% - 98%)    Parameters below as of 25 Jul 2023 13:21  Patient On (Oxygen Delivery Method): room air        PHYSICAL EXAMINATION:  GENERAL: NAD, well built  HEAD:  Atraumatic, Normocephalic  EYES:  conjunctiva and sclera clear  NECK: Supple, No JVD, Normal thyroid  CHEST/LUNG: Clear to auscultation. Clear to percussion bilaterally; No rales, rhonchi, wheezing, or rubs  HEART: Regular rate and rhythm; No murmurs, rubs, or gallops  ABDOMEN: Soft, Nontender, Nondistended; Bowel sounds present, no pain or masses on palpation  NERVOUS SYSTEM:  Alert & Oriented X3  : voiding well  EXTREMITIES:  2+ Peripheral Pulses, No clubbing, cyanosis, or edema  SKIN: warm dry                          10.9   6.32  )-----------( 264      ( 25 Jul 2023 07:45 )             34.3     07-25    142  |  110<H>  |  13  ----------------------------<  107<H>  4.1   |  27  |  0.53    Ca    8.6      25 Jul 2023 07:45  Phos  3.2     07-25  Mg     2.5     07-25                I&O's Summary          CAPILLARY BLOOD GLUCOSE      RADIOLOGY & ADDITIONAL TESTS:                       PGY-1 Progress Note discussed with attending        INTERVAL HPI/OVERNIGHT EVENTS: Pt. seen and examined bedside. Pt. reports improvement from previous night, no drainage from wound site and bleeding, and slept well. States right ear pain is better as well, though still doesn't feel right. Dressing changed after inspection.      REVIEW OF SYSTEMS:  CONSTITUTIONAL: No fever, weight loss, or fatigue  HEENT: (+) Rt. ear pain and hearing diffculty  RESPIRATORY: No cough, wheezing, chills or hemoptysis; No shortness of breath  CARDIOVASCULAR: (+) chronic leg swelling. No chest pain, palpitations, dizziness  GASTROINTESTINAL: No abdominal pain. No nausea, vomiting, or hematemesis; No diarrhea or constipation. No melena or hematochezia.  GENITOURINARY: No dysuria or hematuria, urinary frequency  NEUROLOGICAL: No headaches, memory loss, loss of strength, numbness, or tremors  SKIN: (+) Wound on left thigh s/p debridement +dressing . No itching, burning    MEDICATIONS  (STANDING):  chlorhexidine 2% Cloths 1 Application(s) Topical <User Schedule>  doxycycline monohydrate Capsule 100 milliGRAM(s) Oral every 12 hours  enoxaparin Injectable 40 milliGRAM(s) SubCutaneous every 24 hours  levothyroxine 50 MICROGram(s) Oral daily  polyethylene glycol 3350 17 Gram(s) Oral daily  senna 2 Tablet(s) Oral at bedtime    MEDICATIONS  (PRN):  acetaminophen     Tablet .. 650 milliGRAM(s) Oral every 6 hours PRN Temp greater or equal to 38C (100.4F), Mild Pain (1 - 3)      Vital Signs Last 24 Hrs  T(C): 36.3 (25 Jul 2023 13:21), Max: 36.6 (24 Jul 2023 20:22)  T(F): 97.4 (25 Jul 2023 13:21), Max: 97.8 (24 Jul 2023 20:22)  HR: 64 (25 Jul 2023 13:21) (59 - 75)  BP: 132/67 (25 Jul 2023 13:21) (132/67 - 134/65)  BP(mean): --  RR: 20 (25 Jul 2023 13:21) (16 - 20)  SpO2: 98% (25 Jul 2023 13:21) (94% - 98%)    Parameters below as of 25 Jul 2023 13:21  Patient On (Oxygen Delivery Method): room air        PHYSICAL EXAMINATION:  GENERAL: NAD, well built,   HEAD:  Atraumatic, Normocephalic  EYES:  conjunctiva and sclera clear  Ear: (+) pain on palpation right mastoid region  NECK: Supple, No JVD, Normal thyroid  CHEST/LUNG: Clear to auscultation. Clear to percussion bilaterally; No rales, rhonchi, wheezing, or rubs  HEART: S1, S2 Regular rate and rhythm; No rubs, or gallops  ABDOMEN: (+) Hernia LLQ. Soft, Nontender, Nondistended; Bowel sounds present  NERVOUS SYSTEM:  Alert & Oriented X3,    EXTREMITIES: (+) non-pitting edema in b/l LE's. +Cyst on dorsum of left foot. 2+ Peripheral Pulses, No clubbing, cyanosis  SKIN: (+) dressing intact on left thigh/buttock. Little drainage and blood from upon removal.                            10.9   6.32  )-----------( 264      ( 25 Jul 2023 07:45 )             34.3     07-25    142  |  110<H>  |  13  ----------------------------<  107<H>  4.1   |  27  |  0.53    Ca    8.6      25 Jul 2023 07:45  Phos  3.2     07-25  Mg     2.5     07-25                I&O's Summary          CAPILLARY BLOOD GLUCOSE      RADIOLOGY & ADDITIONAL TESTS:                       PGY-1 Progress Note discussed with attending        INTERVAL HPI/OVERNIGHT EVENTS: Pt. seen and examined bedside. Pt. reports improvement from previous night, no drainage from wound site and bleeding, and slept well. Dressing changed after inspection. States right ear pain is better as well, though still doesn't feel right.       REVIEW OF SYSTEMS:  CONSTITUTIONAL: No fever, weight loss, or fatigue  HEENT: (+) Rt. ear pain and hearing diffculty  RESPIRATORY: No cough, wheezing, chills or hemoptysis; No shortness of breath  CARDIOVASCULAR: (+) chronic leg swelling. No chest pain, palpitations, dizziness  GASTROINTESTINAL: No abdominal pain. No nausea, vomiting, or hematemesis; No diarrhea or constipation. No melena or hematochezia.  GENITOURINARY: No dysuria or hematuria, urinary frequency  NEUROLOGICAL: No headaches, memory loss, loss of strength, numbness, or tremors  SKIN: (+) Wound on left thigh s/p debridement +dressing . No itching, burning    MEDICATIONS  (STANDING):  chlorhexidine 2% Cloths 1 Application(s) Topical <User Schedule>  doxycycline monohydrate Capsule 100 milliGRAM(s) Oral every 12 hours  enoxaparin Injectable 40 milliGRAM(s) SubCutaneous every 24 hours  levothyroxine 50 MICROGram(s) Oral daily  polyethylene glycol 3350 17 Gram(s) Oral daily  senna 2 Tablet(s) Oral at bedtime    MEDICATIONS  (PRN):  acetaminophen     Tablet .. 650 milliGRAM(s) Oral every 6 hours PRN Temp greater or equal to 38C (100.4F), Mild Pain (1 - 3)      Vital Signs Last 24 Hrs  T(C): 36.3 (25 Jul 2023 13:21), Max: 36.6 (24 Jul 2023 20:22)  T(F): 97.4 (25 Jul 2023 13:21), Max: 97.8 (24 Jul 2023 20:22)  HR: 64 (25 Jul 2023 13:21) (59 - 75)  BP: 132/67 (25 Jul 2023 13:21) (132/67 - 134/65)  BP(mean): --  RR: 20 (25 Jul 2023 13:21) (16 - 20)  SpO2: 98% (25 Jul 2023 13:21) (94% - 98%)    Parameters below as of 25 Jul 2023 13:21  Patient On (Oxygen Delivery Method): room air        PHYSICAL EXAMINATION:  GENERAL: NAD, well built,   HEAD:  Atraumatic, Normocephalic  EYES:  conjunctiva and sclera clear  Ear: (+) pain on palpation right mastoid region  NECK: Supple, No JVD, Normal thyroid  CHEST/LUNG: Clear to auscultation. Clear to percussion bilaterally; No rales, rhonchi, wheezing, or rubs  HEART: S1, S2 Regular rate and rhythm; No rubs, or gallops  ABDOMEN: (+) Hernia LLQ. Soft, Nontender, Nondistended; Bowel sounds present  NERVOUS SYSTEM:  Alert & Oriented X3,    EXTREMITIES: (+) non-pitting edema in b/l LE's. +Cyst on dorsum of left foot. 2+ Peripheral Pulses, No clubbing, cyanosis  SKIN: (+) dressing intact on left thigh/buttock. Little drainage and blood from upon removal.                            10.9   6.32  )-----------( 264      ( 25 Jul 2023 07:45 )             34.3     07-25    142  |  110<H>  |  13  ----------------------------<  107<H>  4.1   |  27  |  0.53    Ca    8.6      25 Jul 2023 07:45  Phos  3.2     07-25  Mg     2.5     07-25                I&O's Summary          CAPILLARY BLOOD GLUCOSE      RADIOLOGY & ADDITIONAL TESTS:

## 2023-07-25 NOTE — DISCHARGE NOTE PROVIDER - CARE PROVIDER_API CALL
Brant Howard.  Surgery  [address] [facility], [address]  [place] [address]  Phone: [phone]  Fax: [phone]  Follow Up Time: 2 weeks   Brant Howard.  Surgery  [address] [facility], Floor [address]  [place] [address]  Phone: [phone]  Fax: [phone]  Follow Up Time: 2 weeks    Rose Crowley  Hematology  [address] [place] [address]  Phone: [phone]  Fax: [phone]  Follow Up Time: 1 week    Drew Perry  Gastroenterology  [address] [facility], 2nd [place] [address]  Phone: [phone]  Fax: [phone]  Follow Up Time: 1 week   Brant Howard.  Surgery  [address] [facility], Floor [address]  [place] [address]  Phone: [phone]  Fax: [phone]  Established Patient  Scheduled Appointment: 08/10/2023 11:45 AM    Rose Crowley  Hematology  [address] [place] [address]  Phone: [phone]  Fax: [phone]  Follow Up Time: 1 week    Drew Perry  Gastroenterology  [address] [facility], 2nd [place] [address]  Phone: [phone]  Fax: [phone]  Follow Up Time: 1 week   Brant Howard.  Surgery  [address] [facility], Floor 1  [place] [address]  Phone: [phone]  Fax: [phone]  Established Patient  Scheduled Appointment: 08/10/2023 11:45 AM    Rose Crowley  Hematology  [address] [place] [address]  Phone: [phone]  Fax: [phone]  Follow Up Time: 1 week    Drew Perry  Gastroenterology  [address] [place] [address], [address]nd [place] [address]  Phone: [phone]  Fax: [phone]  Follow Up Time: 1 week    [facility],   -[address] [place] [address]  Phone: [phone]  Fax: (   )    -  Scheduled Appointment: 08/07/2023 10:00 AM

## 2023-07-25 NOTE — DISCHARGE NOTE PROVIDER - NSDCCAREPROVSEEN_GEN_ALL_CORE_FT
Link, Jan Vincent, Laura Pacheco, Lay Paula, Isa Barnes, Dionicio Theodore, Nicholas Duque, Sandy Roberts, Amparo Boggs, Su Crowley, Amador Brown, Shalom Villafuerte, Rufino Romero, Avelino Howard, Brant Coppola, Gaagn Solano, Andres Siddiqui, Olinda

## 2023-07-25 NOTE — DISCHARGE NOTE PROVIDER - CARE PROVIDERS DIRECT ADDRESSES
,[email or web address],DirectAddress_Unknown

## 2023-07-25 NOTE — DISCHARGE NOTE PROVIDER - HOSPITAL COURSE
This is an 88 y/o female from home with Pmhx of HTN, PAD with left LE stent(sep 22), endometrial ca with JENNIFER lung mets s/p resection, chemo, and radiotherapy presenting to the hospital with left thigh swelling. Admitted for cellulitis with abscess to left upper thigh, for I&D in OR on 7/22.      #Abscess of skin.   ·  Plan: p/w left posterior upper thight cellulitis and abscess  CT w/ IV cont LLE: cellulitis post medial proximal and mid left thigh 2.5 x 2.1 x 3  I&D (07/22/23)  - s/p Vanc in ED  - Per Dr. Boggs- c/w vancomycin and ceftriaxone  - intra-op wound cultures: staph aureus, follow MRSA swab  - 7/23 ESR-52; CRP-104  - PT recom'd home pt  - Tylenol and ketorolac for pain.    # Cellulitis of skin.   ·  Plan: as above  -vanc trough: 13.6.    #Primary neoplasm of endometrium.   ·  Plan: primary ca of endometrium s/p 2 sux resection (2009 &2018)  with chemo and radiotherapy  with metastasis to lung with 2cm nodule s/p JENNIFER lobectomy   pt follows up with Dr. Rose Crowley  pt has new 5 mm lung nodule and cecal mass.   Referred to out patient GI (Dr. Perry) for colonoscopy.  anastrazole held since May 23.    #Heart murmur.   ·  Plan: 7/23 murmur heard incidentally on physical exam  -Echo ordered.    #Hypothyroid.   ·  Plan: hx hypothyroid on levothyroxine 50mcg  cont home med  f/u TSH.    #Prophylactic measure.   ·  Plan: DVT ppx with Lovenox.    #Swelling of lower extremity.   ·  Plan: hx b/l LE non pitting edema  on furosemide 40mg PRN.    #PAD (peripheral artery disease).   ·  Plan: hx PAD s/p stent in LLE in sept 22.   This is an 88 y/o female from home with Pmhx of HTN, PAD with left LE stent(sep 22), endometrial ca with JENNIFER lung mets s/p resection, chemo, and radiotherapy presenting to the hospital with left thigh swelling. Admitted for cellulitis with abscess to left upper thigh, for I&D in OR on 7/22.      #Abscess of skin.   ·  Plan: p/w left posterior upper thight cellulitis and abscess  CT w/ IV cont LLE: cellulitis post medial proximal and mid left thigh 2.5 x 2.1 x 3  I&D (07/22/23)  - s/p Vanc in ED  - Per Dr. Boggs- c/w vancomycin and ceftriaxone  - intra-op wound cultures: staph aureus, follow MRSA swab  - 7/23 ESR-52; CRP-104  - PT recom'd home pt  - Tylenol and ketorolac for pain.    # Cellulitis of skin.   ·  Plan: as above  -vanc trough: 13.6.    #Primary neoplasm of endometrium.   ·  Plan: primary ca of endometrium s/p 2 sux resection (2009 &2018)  with chemo and radiotherapy  with metastasis to lung with 2cm nodule s/p JENNIFER lobectomy   pt follows up with Dr. Rose Crowley  pt has new 5 mm lung nodule and cecal mass.   Referred to out patient GI (Dr. Perry) for colonoscopy.  anastrazole held since May 23.    #Heart murmur.   ·  Plan: 7/23 murmur heard incidentally on physical exam  1. Moderate posterior mitral annular calcification; normal  mitral valve leaflets. Trace mitral regurgitation.  2. Calcified trileaflet aortic valve.  No aortic stenosis.  No aortic valve regurgitation seen.  3. Moderately increased left ventricular wall thickness.  Differential includes hypertensive cardiomyopathy,  infiltrative cardiomyopathy, and hypertrophic  cardiomyopathy, among others. Consider cardiac MRI if  clinically indicated.  4. Endocardium not well visualized; normal left ventricular  systolic function (LVEF 55-60% by visual estimation). Not  all LV wall segments were seen.  5. Grade I diastolic dysfunction (Impaired relaxation,  mild).  6. Normal right ventricular size and systolic function  (TAPSE 2.1 cm).    #Hypothyroid.   ·  Plan: hx hypothyroid on levothyroxine 50mcg  cont home med  f/u TSH.    #Prophylactic measure.   ·  Plan: DVT ppx with Lovenox.    #Swelling of lower extremity.   ·  Plan: hx b/l LE non pitting edema  on furosemide 40mg PRN.    #PAD (peripheral artery disease).   ·  Plan: hx PAD s/p stent in LLE in sept 22.   This is an 86 y/o female from home with Pmhx of HTN, PAD with left LE stent(sep 22), endometrial ca with JENNIFER lung mets s/p resection, chemo, and radiotherapy presenting to the hospital with left thigh swelling. Admitted for cellulitis with abscess to left upper thigh, for I&D in OR on 7/22.      #Abscess of skin.   ·  Plan: p/w left posterior upper thight cellulitis and abscess  CT w/ IV cont LLE: cellulitis post medial proximal and mid left thigh 2.5 x 2.1 x 3  I&D (07/22/23)  - s/p Vanc in ED  - Per Dr. Boggs- c/w vancomycin and ceftriaxone; to be discharged on Doxy 100 BID 7 Days  - intra-op wound cultures: staph aureus, follow MRSA swab; MRSA Postive  - 7/23 ESR-52; CRP-104  - PT recom'd home pt  - Tylenol and ketorolac for pain.    # Cellulitis of skin.   ·  Plan: as above  -vanc trough: 13.6.    #Primary neoplasm of endometrium.   ·  Plan: primary ca of endometrium s/p 2 sux resection (2009 &2018)  with chemo and radiotherapy  with metastasis to lung with 2cm nodule s/p JENNIFER lobectomy   pt follows up with Dr. Rose Crowley  pt has new 5 mm lung nodule and cecal mass.   Referred to out patient GI (Dr. Perry) for colonoscopy.  anastrazole held since May 23.    #Heart murmur.   ·  Plan: 7/23 murmur heard incidentally on physical exam  1. Moderate posterior mitral annular calcification; normal  mitral valve leaflets. Trace mitral regurgitation.  2. Calcified trileaflet aortic valve.  No aortic stenosis.  No aortic valve regurgitation seen.  3. Moderately increased left ventricular wall thickness.  Differential includes hypertensive cardiomyopathy,  infiltrative cardiomyopathy, and hypertrophic  cardiomyopathy, among others. Consider cardiac MRI if  clinically indicated.  4. Endocardium not well visualized; normal left ventricular  systolic function (LVEF 55-60% by visual estimation). Not  all LV wall segments were seen.  5. Grade I diastolic dysfunction (Impaired relaxation,  mild).  6. Normal right ventricular size and systolic function  (TAPSE 2.1 cm).    #Hypothyroid.   ·  Plan: hx hypothyroid on levothyroxine 50mcg  cont home med  f/u TSH.    #Prophylactic measure.   ·  Plan: DVT ppx with Lovenox.    #Swelling of lower extremity.   ·  Plan: hx b/l LE non pitting edema  on furosemide 40mg PRN.    #PAD (peripheral artery disease).   ·  Plan: hx PAD s/p stent in LLE in sept 22.   This is an 86 y/o female from home with Pmhx of HTN, PAD with left LE stent(sep 22), endometrial ca with JENNIFER lung mets s/p resection, chemo, and radiotherapy presenting to the hospital with left thigh swelling. Admitted for cellulitis with abscess to left upper thigh, for I&D in OR on 7/22.      #Abscess of skin.   ·  Plan: p/w left posterior upper thight cellulitis and abscess  CT w/ IV cont LLE: cellulitis post medial proximal and mid left thigh 2.5 x 2.1 x 3  I&D (07/22/23) s/p Vanc in ED, Per Dr. Boggs- c/w vancomycin and ceftriaxone; to be discharged on Doxy 100 BID 7 Days intra-op wound cultures: staph aureus, follow MRSA swab; MRSA Postive      #Primary neoplasm of endometrium.   ·  Plan: primary ca of endometrium s/p 2 sux resection (2009 &2018)  with chemo and radiotherapy  with metastasis to lung with 2cm nodule s/p JENNIFER lobectomy  pt follows up with Dr. Rose Crowley pt has new 5 mm lung nodule and cecal mass.  Referred to out patient GI (Dr. Perry) for colonoscopy.  anastrazole held since May 23.    #Heart murmur.   ·  Plan: 7/23 murmur heard incidentally on physical exam  1. Moderate posterior mitral annular calcification; normal  mitral valve leaflets. Trace mitral regurgitation.  2. Calcified trileaflet aortic valve.  No aortic stenosis.  No aortic valve regurgitation seen.  3. Moderately increased left ventricular wall thickness.  Differential includes hypertensive cardiomyopathy,  infiltrative cardiomyopathy, and hypertrophic  cardiomyopathy, among others. Consider cardiac MRI if  clinically indicated.  4. Endocardium not well visualized; normal left ventricular  systolic function (LVEF 55-60% by visual estimation). Not  all LV wall segments were seen.  5. Grade I diastolic dysfunction (Impaired relaxation,  mild).  6. Normal right ventricular size and systolic function  (TAPSE 2.1 cm).    #Hypothyroid.   ·  Plan: hx hypothyroid on levothyroxine 50mcg  cont home med    #Prophylactic measure.   ·  Plan: DVT ppx with Lovenox.    #Swelling of lower extremity.   ·  Plan: hx b/l LE non pitting edema  on furosemide 40mg PRN.    #PAD (peripheral artery disease).   ·  Plan: hx PAD s/p stent in LLE in sept 22.   This is an 88 y/o female from home with Pmhx of HTN, PAD with left LE stent(sep 22), endometrial ca with JENNIFER lung mets s/p resection, chemo, and radiotherapy presenting to the hospital with left thigh swelling. Admitted for cellulitis with abscess to left upper thigh, for I&D in OR on 7/22.      #Abscess of skin.   ·  Plan: p/w left posterior upper thight cellulitis and abscess  CT w/ IV cont LLE: cellulitis post medial proximal and mid left thigh 2.5 x 2.1 x 3  I&D (07/22/23) s/p Vanc in ED, Per Dr. Boggs- c/w vancomycin and ceftriaxone; to be discharged on Doxy 100 BID 7 Days intra-op wound cultures: staph aureus, follow MRSA swab; MRSA Postive      #Primary neoplasm of endometrium.   ·  Plan: primary ca of endometrium s/p 2 sux resection (2009 &2018)  with chemo and radiotherapy  with metastasis to lung with 2cm nodule s/p JENNIFER lobectomy  pt follows up with Dr. Rose Crowley pt has new 5 mm lung nodule and cecal mass.  Referred to out patient GI (Dr. Perry) for colonoscopy.  anastrazole held since May 23.    #Heart murmur.   ·  Plan: 7/23 murmur heard incidentally on physical exam  1. Moderate posterior mitral annular calcification; normal  mitral valve leaflets. Trace mitral regurgitation.  2. Calcified trileaflet aortic valve.  No aortic stenosis.  No aortic valve regurgitation seen.  3. Moderately increased left ventricular wall thickness.  Differential includes hypertensive cardiomyopathy,  infiltrative cardiomyopathy, and hypertrophic  cardiomyopathy, among others. Consider cardiac MRI if  clinically indicated.  4. Endocardium not well visualized; normal left ventricular  systolic function (LVEF 55-60% by visual estimation). Not  all LV wall segments were seen.  5. Grade I diastolic dysfunction (Impaired relaxation,  mild).  6. Normal right ventricular size and systolic function  (TAPSE 2.1 cm).    #Hypothyroid.   ·  Plan: hx hypothyroid on levothyroxine 50mcg  cont home med    #Prophylactic measure.   ·  Plan: DVT ppx with Lovenox.    #Swelling of lower extremity.   ·  Plan: hx b/l LE non pitting edema  on furosemide 40mg PRN.    #PAD (peripheral artery disease).   ·  Plan: hx PAD s/p stent in LLE in sept 22. Patient was started on aspirin, to follow up with her PCP/vascular surgeon.

## 2023-07-25 NOTE — PROGRESS NOTE ADULT - ASSESSMENT
Left thigh abscess/ cellulitis - S/P I&D  Leukocytosis - normalized      Plan - Cont Vancomycin 1250mgs iv q12hrs   the resident had switched her to doxycycline this evening but will keep on IV abxs until she is actually discharged then switch to Doxycycline 100mgs po bid x 7 days upon discharge.   Left thigh abscess/ cellulitis - S/P I&D  Leukocytosis - normalized      Plan - Cont Vancomycin 1250mgs iv q12hrs   the resident had switched her to doxycycline this evening but will keep on IV abxs until she is actually discharged then switch to Doxycycline 100mgs po bid x 7 days upon discharge.  Reconsult  prn.

## 2023-07-25 NOTE — PROGRESS NOTE ADULT - SUBJECTIVE AND OBJECTIVE BOX
87y Female    Meds:  doxycycline monohydrate Capsule 100 milliGRAM(s) Oral every 12 hours    Allergies    codeine (Nausea)    Intolerances        VITALS:  Vital Signs Last 24 Hrs  T(C): 36.3 (25 Jul 2023 13:21), Max: 36.6 (24 Jul 2023 20:22)  T(F): 97.4 (25 Jul 2023 13:21), Max: 97.8 (24 Jul 2023 20:22)  HR: 64 (25 Jul 2023 13:21) (59 - 75)  BP: 132/67 (25 Jul 2023 13:21) (132/67 - 134/65)  BP(mean): --  RR: 20 (25 Jul 2023 13:21) (16 - 20)  SpO2: 98% (25 Jul 2023 13:21) (94% - 98%)    Parameters below as of 25 Jul 2023 13:21  Patient On (Oxygen Delivery Method): room air        LABS/DIAGNOSTIC TESTS:                          10.9   6.32  )-----------( 264      ( 25 Jul 2023 07:45 )             34.3         07-25    142  |  110<H>  |  13  ----------------------------<  107<H>  4.1   |  27  |  0.53    Ca    8.6      25 Jul 2023 07:45  Phos  3.2     07-25  Mg     2.5     07-25            CULTURES: .Surgical Swab left leg abscess  07-22 @ 11:58   Numerous Methicillin Resistant Staphylococcus aureus  --  Methicillin resistant Staphylococcus aureus            RADIOLOGY:      ROS:  [  ] UNABLE TO ELICIT 87y Female who is doing better, she still has left thigh pain and has a lot of purulent drainage, she has no fevers or chills , no diarrhea or other complaints. Her abscess cultures grew out MRSA.    Meds:  doxycycline monohydrate Capsule 100 milliGRAM(s) Oral every 12 hours    Allergies    codeine (Nausea)    Intolerances        VITALS:  Vital Signs Last 24 Hrs  T(C): 36.3 (25 Jul 2023 13:21), Max: 36.6 (24 Jul 2023 20:22)  T(F): 97.4 (25 Jul 2023 13:21), Max: 97.8 (24 Jul 2023 20:22)  HR: 64 (25 Jul 2023 13:21) (59 - 75)  BP: 132/67 (25 Jul 2023 13:21) (132/67 - 134/65)  BP(mean): --  RR: 20 (25 Jul 2023 13:21) (16 - 20)  SpO2: 98% (25 Jul 2023 13:21) (94% - 98%)    Parameters below as of 25 Jul 2023 13:21  Patient On (Oxygen Delivery Method): room air        LABS/DIAGNOSTIC TESTS:                          10.9   6.32  )-----------( 264      ( 25 Jul 2023 07:45 )             34.3         07-25    142  |  110<H>  |  13  ----------------------------<  107<H>  4.1   |  27  |  0.53    Ca    8.6      25 Jul 2023 07:45  Phos  3.2     07-25  Mg     2.5     07-25            CULTURES: .Surgical Swab left leg abscess  07-22 @ 11:58   Numerous Methicillin Resistant Staphylococcus aureus  --  Methicillin resistant Staphylococcus aureus            RADIOLOGY:      ROS:  [  ] UNABLE TO ELICIT

## 2023-07-25 NOTE — DISCHARGE NOTE PROVIDER - NSDCCPCAREPLAN_GEN_ALL_CORE_FT
PRINCIPAL DISCHARGE DIAGNOSIS  Diagnosis: Cellulitis and abscess of leg  Assessment and Plan of Treatment: You came in with infection and cellulitis of the left leg. You were treated with antibiotics, surgery was performed, and cultures of the infection abscess performed demonstrated that you have MRSA infection. Infectious disease recommended that you take:      SECONDARY DISCHARGE DIAGNOSES  Diagnosis: Primary neoplasm of endometrium  Assessment and Plan of Treatment:     Diagnosis: PAD (peripheral artery disease)  Assessment and Plan of Treatment:     Diagnosis: Heart murmur  Assessment and Plan of Treatment:     Diagnosis: PAD (peripheral artery disease)  Assessment and Plan of Treatment:     Diagnosis: Hypothyroid  Assessment and Plan of Treatment:      PRINCIPAL DISCHARGE DIAGNOSIS  Diagnosis: Cellulitis and abscess of leg  Assessment and Plan of Treatment: You came in with infection and cellulitis of the left leg. You were treated with antibiotics, surgery was performed, and cultures of the infection abscess performed demonstrated that you have MRSA infection. Infectious disease recommended that you take: DOXYCYCLINE 100MG TWICE A DAY FOR 7 MORE DAYS  SURGERY RECOMMENDED:  left posterior thigh wound with 7cm x 2cm x 5cm wound, please change packing daily with sterile wet to dry kerlix, cover with dry gauze and medipore. Pt may shower prior to dressing changes and irrigate wound.Pt will need a visiting nurse to help with dressing changes upon discharge      SECONDARY DISCHARGE DIAGNOSES  Diagnosis: Primary neoplasm of endometrium  Assessment and Plan of Treatment: You have a history of cancer in your utertus. Please follow up with your PCP    Diagnosis: PAD (peripheral artery disease)  Assessment and Plan of Treatment: Peripheral Arterial Disease (PAD) Peripheral arterial disease (PAD) in the legs or lower extremities is the narrowing or blockage of the vessels that carry blood from the heart to the legs. It is primarily caused by the buildup of fatty plaque in the arteries, which is called atherosclerosis. Please follow up with your PCP and resume your medications.    Diagnosis: Heart murmur  Assessment and Plan of Treatment: You had a heart murmur which means you had an heart sound; an echocardiogram; ultrasound of your heart was taken to examine the motion in your heart chambers.  THIS IS THE RESULT OF YOUR ECHO  PLEASE FOLLOW UP WITH YOUR PCP  CONCLUSIONS:  1. Moderate posterior mitral annular calcification; normal  mitral valve leaflets. Trace mitral regurgitation.  2. Calcified trileaflet aortic valve.  No aortic stenosis.  No aortic valve regurgitation seen.  3. Moderately increased left ventricular wall thickness.  Differential includes hypertensive cardiomyopathy,  infiltrative cardiomyopathy, and hypertrophic  cardiomyopathy, among others. Consider cardiac MRI if  clinically indicated.  4. Endocardium not well visualized; normal left ventricular  systolic function (LVEF 55-60% by visual estimation). Not  all LV wall segments were seen.  5. Grade I diastolic dysfunction (Impaired relaxation,  mild).  6. Normal right ventricular size and systolic function  (TAPSE 2.1 cm).      Diagnosis: Hypothyroid  Assessment and Plan of Treatment: Please continue taking your thyroid medications and follow up with your PCP     PRINCIPAL DISCHARGE DIAGNOSIS  Diagnosis: Cellulitis and abscess of leg  Assessment and Plan of Treatment: You came in with infection and cellulitis of the left leg. You were treated with antibiotics, surgery was performed, and cultures of the infection abscess performed demonstrated that you have MRSA infection. Infectious disease recommended that you take: DOXYCYCLINE 100MG TWICE A DAY FOR 7 MORE DAYS  SURGERY RECOMMENDED:  left posterior thigh wound with 7cm x 2cm x 5cm wound, please change packing daily with sterile wet to dry kerlix, cover with dry gauze and medipore. Pt may shower prior to dressing changes and irrigate wound.Pt will need a visiting nurse to help with dressing changes upon discharge      SECONDARY DISCHARGE DIAGNOSES  Diagnosis: PAD (peripheral artery disease)  Assessment and Plan of Treatment: Peripheral Arterial Disease (PAD) Peripheral arterial disease (PAD) in the legs or lower extremities is the narrowing or blockage of the vessels that carry blood from the heart to the legs. It is primarily caused by the buildup of fatty plaque in the arteries, which is called atherosclerosis. Please follow up with your PCP and resume your medications.  PLEASE FOLLOW UP WITH YOUR PCP AND VASCULAR SURGEON REGARDING ASPIRIN MEDICATION BECAUSE OF YOUR STENTS    Diagnosis: Primary neoplasm of endometrium  Assessment and Plan of Treatment: You have a history of cancer in your utertus. Please follow up with your PCP    Diagnosis: Heart murmur  Assessment and Plan of Treatment: You had a heart murmur which means you had an heart sound; an echocardiogram; ultrasound of your heart was taken to examine the motion in your heart chambers.  THIS IS THE RESULT OF YOUR ECHO  PLEASE FOLLOW UP WITH YOUR PCP  CONCLUSIONS:  1. Moderate posterior mitral annular calcification; normal  mitral valve leaflets. Trace mitral regurgitation.  2. Calcified trileaflet aortic valve.  No aortic stenosis.  No aortic valve regurgitation seen.  3. Moderately increased left ventricular wall thickness.  Differential includes hypertensive cardiomyopathy,  infiltrative cardiomyopathy, and hypertrophic  cardiomyopathy, among others. Consider cardiac MRI if  clinically indicated.  4. Endocardium not well visualized; normal left ventricular  systolic function (LVEF 55-60% by visual estimation). Not  all LV wall segments were seen.  5. Grade I diastolic dysfunction (Impaired relaxation,  mild).  6. Normal right ventricular size and systolic function  (TAPSE 2.1 cm).      Diagnosis: Hypothyroid  Assessment and Plan of Treatment: Please continue taking your thyroid medications and follow up with your PCP     PRINCIPAL DISCHARGE DIAGNOSIS  Diagnosis: Cellulitis and abscess of leg  Assessment and Plan of Treatment: You came in with infection and cellulitis of the left leg. You were treated with antibiotics, surgery was performed, and cultures of the infection abscess performed demonstrated that you have MRSA infection. Infectious disease recommended that you take: DOXYCYCLINE 100MG TWICE A DAY FOR 4 MORE DAYS  SURGERY RECOMMENDED:  left posterior thigh wound with 7cm x 2cm x 5cm wound, please change packing daily with sterile wet to dry kerlix, cover with dry gauze and medipore. Pt may shower prior to dressing changes and irrigate wound.Pt will need a visiting nurse to help with dressing changes upon discharge      SECONDARY DISCHARGE DIAGNOSES  Diagnosis: PAD (peripheral artery disease)  Assessment and Plan of Treatment: Peripheral Arterial Disease (PAD) Peripheral arterial disease (PAD) in the legs or lower extremities is the narrowing or blockage of the vessels that carry blood from the heart to the legs. It is primarily caused by the buildup of fatty plaque in the arteries, which is called atherosclerosis. Please follow up with your PCP and resume your medications.  PLEASE FOLLOW UP WITH YOUR PCP AND VASCULAR SURGEON REGARDING ASPIRIN MEDICATION BECAUSE OF YOUR STENTS    Diagnosis: Primary neoplasm of endometrium  Assessment and Plan of Treatment: You have a history of cancer in your utertus. Please follow up with your PCP    Diagnosis: Heart murmur  Assessment and Plan of Treatment: You had a heart murmur which means you had an heart sound; an echocardiogram; ultrasound of your heart was taken to examine the motion in your heart chambers.  THIS IS THE RESULT OF YOUR ECHO  PLEASE FOLLOW UP WITH YOUR PCP  CONCLUSIONS:  1. Moderate posterior mitral annular calcification; normal  mitral valve leaflets. Trace mitral regurgitation.  2. Calcified trileaflet aortic valve.  No aortic stenosis.  No aortic valve regurgitation seen.  3. Moderately increased left ventricular wall thickness.  Differential includes hypertensive cardiomyopathy,  infiltrative cardiomyopathy, and hypertrophic  cardiomyopathy, among others. Consider cardiac MRI if  clinically indicated.  4. Endocardium not well visualized; normal left ventricular  systolic function (LVEF 55-60% by visual estimation). Not  all LV wall segments were seen.  5. Grade I diastolic dysfunction (Impaired relaxation,  mild).  6. Normal right ventricular size and systolic function  (TAPSE 2.1 cm).      Diagnosis: Hypothyroid  Assessment and Plan of Treatment: Please continue taking your thyroid medications and follow up with your PCP

## 2023-07-25 NOTE — PROGRESS NOTE ADULT - PROBLEM SELECTOR PLAN 4
7/23 murmur heard incidentally on physical exam  -Echo (07/24):  1. Moderate posterior mitral annular calcification; normal  mitral valve leaflets. Trace mitral regurgitation.  2. Calcified trileaflet aortic valve.  No aortic stenosis.  No aortic valve regurgitation seen.  3. Moderately increased left ventricular wall thickness.  Differential includes hypertensive cardiomyopathy,  infiltrative cardiomyopathy, and hypertrophic  cardiomyopathy, among others. Consider cardiac MRI if  clinically indicated.  4. Endocardium not well visualized; normal left ventricular  systolic function (LVEF 55-60% by visual estimation). Not  all LV wall segments were seen.  5. Grade I diastolic dysfunction (Impaired relaxation,  mild).  6. Normal right ventricular size and systolic function  (TAPSE 2.1 cm).

## 2023-07-25 NOTE — DISCHARGE NOTE PROVIDER - ATTENDING DISCHARGE PHYSICAL EXAMINATION:
Vital Signs Last 24 Hrs  T(C): 36.7 (31 Jul 2023 20:55), Max: 36.7 (31 Jul 2023 20:55)  T(F): 98.1 (31 Jul 2023 20:55), Max: 98.1 (31 Jul 2023 20:55)  HR: 82 (31 Jul 2023 20:55) (62 - 82)  BP: 135/65 (31 Jul 2023 20:55) (120/56 - 135/65)  BP(mean): --  RR: 18 (31 Jul 2023 20:55) (18 - 18)  SpO2: 97% (31 Jul 2023 20:55) (96% - 98%)    Parameters below as of 31 Jul 2023 20:55  Patient On (Oxygen Delivery Method): room air    CONSTITUTIONAL: Well appearing, well nourished, awake, alert and in no apparent distress  ENMT: Airway patent, Nasal mucosa clear. Mouth with normal mucosa. Throat has no vesicles, no oropharyngeal exudates and uvula is midline.  EYES: Clear bilaterally, pupils equal, round and reactive to light. EOMI.  CARDIAC: Normal rate, regular rhythm.  Heart sounds S1, S2.  No murmurs, rubs or gallops   RESPIRATORY: Breath sounds clear and equal bilaterally. No wheezes, rales or rhonchi  MUSCULOSKELETAL: Spine appears normal, range of motion is not limited, no muscle or joint tenderness, left thigh open wound w/ no exudative discharge   EXTREMITIES: No edema, cyanosis or deformity   NEUROLOGICAL: Alert and oriented, no focal deficits, no motor or sensory deficits.  SKIN: No rash, skin turgor

## 2023-07-25 NOTE — PROGRESS NOTE ADULT - PROBLEM SELECTOR PLAN 1
p/w left posterior upper thight cellulitis and abscess  CT w/ IV cont LLE: cellulitis post medial proximal and mid left thigh 2.5 x 2.1 x 3  7/23 ESR-52; CRP-104    I&D (07/22/23)  Intra-op CX: MRSA (+)    - DC ceftriaxone, c/w vancomycin, and switch to bactrim once discharged  - PT recom'd home pt  - Tylenol and ketorolac for pain.

## 2023-07-25 NOTE — DISCHARGE NOTE PROVIDER - PROVIDER TOKENS
PROVIDER:[TOKEN:[[id number]],FOLLOWUP:[2 weeks]] PROVIDER:[TOKEN:[[id number]],FOLLOWUP:[2 weeks]],PROVIDER:[TOKEN:[[id number]],FOLLOWUP:[1 week]],PROVIDER:[TOKEN:[[id number]],FOLLOWUP:[1 week]] PROVIDER:[TOKEN:[[id number]],SCHEDULEDAPPT:[08/10/2023],SCHEDULEDAPPTTIME:[11:45 AM],ESTABLISHEDPATIENT:[T]],PROVIDER:[[id number]:[1 week]],PROVIDER:[TOKEN:[[id number]],FOLLOWUP:[1 week]] PROVIDER:[TOKEN:[[id number]],SCHEDULEDAPPT:[08/10/2023],SCHEDULEDAPPTTIME:[11:45 AM],ESTABLISHEDPATIENT:[T]],PROVIDER:[[id number]],FOLLOWUP:[1 week]],PROVIDER:[TOKEN:[[id number]],FOLLOWUP:[1 week]],FREE:[LAST:[[facility]],PHONE:[phone]],FAX:[(   [phone]    -],ADDRESS:[[id number] [place] [address]],SCHEDULEDAPPT:[08/07/2023],SCHEDULEDAPPTTIME:[10:00 AM]]

## 2023-07-25 NOTE — PROGRESS NOTE ADULT - ATTENDING COMMENTS
88 y/o female from home with Pmhx of HTN, PAD with left LE stent(sep 22), endometrial ca with JENNIFER lung mets s/p resection, chemo, and radiotherapy presenting to the hospital with left thigh swelling. Admitted for cellulitis with abscess to left upper thigh, for I&D in OR on 7/22.      Patient was seen and examined, reports having pain in thigh region.     labs reviewed- cbc, bmp, mg, po4     PE as above   Left upper thigh w/ open wound w/ some purulent drainage     A/P:  #Left thigh abscess s/p I&D 7/22   #MRSA infection   #HTN  #PAD s/p stent   #Endometrial ca w/ lung mets - s/p chemo/radiation   #DVT ppx     Plan:  -C/w IV vancomycin for now, check VT.  Will switch to PO doxy upon DC per ID recs.   -D/w surgery service, appreciate WCO-" change packing daily with sterile wet to dry Kerlix, cover with dry gauze and Medipore. Pt may shower prior to dressing changes and irrigate wound. Pt will need a visiting nurse to help with dressing changes upon discharge"  -Spoke w/ CM, needs wound care set up prior to DC.  -C/w levothyroxine.   -Pt w/ hx of PAD s/p stents, not on any anti-platelet agent?. Will confirm home meds w/ her and place necessary orders.   -C/w Lovenox SC

## 2023-07-26 LAB
ANION GAP SERPL CALC-SCNC: 6 MMOL/L — SIGNIFICANT CHANGE UP (ref 5–17)
BUN SERPL-MCNC: 16 MG/DL — SIGNIFICANT CHANGE UP (ref 7–18)
CALCIUM SERPL-MCNC: 8.7 MG/DL — SIGNIFICANT CHANGE UP (ref 8.4–10.5)
CHLORIDE SERPL-SCNC: 109 MMOL/L — HIGH (ref 96–108)
CO2 SERPL-SCNC: 25 MMOL/L — SIGNIFICANT CHANGE UP (ref 22–31)
CREAT SERPL-MCNC: 0.57 MG/DL — SIGNIFICANT CHANGE UP (ref 0.5–1.3)
EGFR: 89 ML/MIN/1.73M2 — SIGNIFICANT CHANGE UP
GLUCOSE SERPL-MCNC: 95 MG/DL — SIGNIFICANT CHANGE UP (ref 70–99)
HCT VFR BLD CALC: 35.6 % — SIGNIFICANT CHANGE UP (ref 34.5–45)
HGB BLD-MCNC: 11.3 G/DL — LOW (ref 11.5–15.5)
MAGNESIUM SERPL-MCNC: 2.5 MG/DL — SIGNIFICANT CHANGE UP (ref 1.6–2.6)
MCHC RBC-ENTMCNC: 30.1 PG — SIGNIFICANT CHANGE UP (ref 27–34)
MCHC RBC-ENTMCNC: 31.7 GM/DL — LOW (ref 32–36)
MCV RBC AUTO: 94.9 FL — SIGNIFICANT CHANGE UP (ref 80–100)
NRBC # BLD: 0 /100 WBCS — SIGNIFICANT CHANGE UP (ref 0–0)
PHOSPHATE SERPL-MCNC: 3 MG/DL — SIGNIFICANT CHANGE UP (ref 2.5–4.5)
PLATELET # BLD AUTO: 306 K/UL — SIGNIFICANT CHANGE UP (ref 150–400)
POTASSIUM SERPL-MCNC: 4.2 MMOL/L — SIGNIFICANT CHANGE UP (ref 3.5–5.3)
POTASSIUM SERPL-SCNC: 4.2 MMOL/L — SIGNIFICANT CHANGE UP (ref 3.5–5.3)
RBC # BLD: 3.75 M/UL — LOW (ref 3.8–5.2)
RBC # FLD: 12.5 % — SIGNIFICANT CHANGE UP (ref 10.3–14.5)
SODIUM SERPL-SCNC: 140 MMOL/L — SIGNIFICANT CHANGE UP (ref 135–145)
WBC # BLD: 6.61 K/UL — SIGNIFICANT CHANGE UP (ref 3.8–10.5)
WBC # FLD AUTO: 6.61 K/UL — SIGNIFICANT CHANGE UP (ref 3.8–10.5)

## 2023-07-26 PROCEDURE — 99233 SBSQ HOSP IP/OBS HIGH 50: CPT

## 2023-07-26 RX ORDER — LANOLIN ALCOHOL/MO/W.PET/CERES
5 CREAM (GRAM) TOPICAL ONCE
Refills: 0 | Status: COMPLETED | OUTPATIENT
Start: 2023-07-26 | End: 2023-07-26

## 2023-07-26 RX ADMIN — Medication 166.67 MILLIGRAM(S): at 05:52

## 2023-07-26 RX ADMIN — Medication 5 MILLIGRAM(S): at 23:50

## 2023-07-26 RX ADMIN — CHLORHEXIDINE GLUCONATE 1 APPLICATION(S): 213 SOLUTION TOPICAL at 05:52

## 2023-07-26 RX ADMIN — Medication 650 MILLIGRAM(S): at 22:29

## 2023-07-26 RX ADMIN — ENOXAPARIN SODIUM 40 MILLIGRAM(S): 100 INJECTION SUBCUTANEOUS at 08:18

## 2023-07-26 RX ADMIN — Medication 650 MILLIGRAM(S): at 21:29

## 2023-07-26 RX ADMIN — Medication 50 MICROGRAM(S): at 05:52

## 2023-07-26 RX ADMIN — Medication 600 MILLIGRAM(S): at 06:01

## 2023-07-26 RX ADMIN — Medication 600 MILLIGRAM(S): at 23:50

## 2023-07-26 RX ADMIN — Medication 166.67 MILLIGRAM(S): at 18:55

## 2023-07-26 NOTE — PROGRESS NOTE ADULT - SUBJECTIVE AND OBJECTIVE BOX
PGY-1 Progress Note discussed with attending    PAGER #: [[phone]] TILL 5:00 PM  PLEASE CONTACT ON CALL TEAM:  - On Call Team (Please refer to Hannah) FROM 5:00 PM - 8:30PM  - Nightfloat Team FROM 8:30 -7:30 AM    CHIEF COMPLAINT & BRIEF HOSPITAL COURSE:    INTERVAL HPI/OVERNIGHT EVENTS:   MEDICATIONS  (STANDING):  chlorhexidine 2% Cloths 1 Application(s) Topical <User Schedule>  enoxaparin Injectable 40 milliGRAM(s) SubCutaneous every 24 hours  levothyroxine 50 MICROGram(s) Oral daily  polyethylene glycol 3350 17 Gram(s) Oral daily  senna 2 Tablet(s) Oral at bedtime  vancomycin  IVPB 1250 milliGRAM(s) IV Intermittent every 12 hours    MEDICATIONS  (PRN):  acetaminophen     Tablet .. 650 milliGRAM(s) Oral every 6 hours PRN Temp greater or equal to 38C (100.4F), Mild Pain (1 - 3)      REVIEW OF SYSTEMS:  CONSTITUTIONAL: No fever, weight loss, or fatigue  RESPIRATORY: No shortness of breath  CARDIOVASCULAR: No chest pain  GASTROINTESTINAL: No abdominal pain.  GENITOURINARY: No dysuria  NEUROLOGICAL: No headaches  SKIN: No itching, burning, rashes    Vital Signs Last 24 Hrs  T(C): 36.6 (26 Jul 2023 05:31), Max: 36.6 (26 Jul 2023 05:31)  T(F): 97.8 (26 Jul 2023 05:31), Max: 97.8 (26 Jul 2023 05:31)  HR: 62 (26 Jul 2023 05:31) (62 - 71)  BP: 151/71 (26 Jul 2023 05:31) (132/67 - 151/71)  BP(mean): --  RR: 18 (26 Jul 2023 05:31) (17 - 20)  SpO2: 97% (26 Jul 2023 05:31) (96% - 100%)    Parameters below as of 26 Jul 2023 05:31  Patient On (Oxygen Delivery Method): room air        PHYSICAL EXAMINATION:  GENERAL: NAD, well built  HEAD:  Atraumatic, Normocephalic  EYES:  conjunctiva and sclera clear  CHEST/LUNG: Clear to auscultation. No rales, rhonchi, wheezing, or rubs  HEART: Regular rate and rhythm; No murmurs, rubs, or gallops  ABDOMEN: Soft, Nontender, Nondistended; Bowel sounds present  NERVOUS SYSTEM:  Alert & Oriented X3,    EXTREMITIES:  2+ Peripheral Pulses, No clubbing, cyanosis, or edema  SKIN: warm dry                          11.3   6.61  )-----------( 306      ( 26 Jul 2023 08:23 )             35.6     07-26    140  |  109<H>  |  16  ----------------------------<  95  4.2   |  25  |  0.57    Ca    8.7      26 Jul 2023 08:23  Phos  3.0     07-26  Mg     2.5     07-26                CAPILLARY BLOOD GLUCOSE      RADIOLOGY & ADDITIONAL TESTS:                   PGY-1 Progress Note discussed with attending    PAGER #: [[phone]] TILL 5:00 PM  PLEASE CONTACT ON CALL TEAM:  - On Call Team (Please refer to Hannah) FROM 5:00 PM - 8:30PM  - Nightfloat Team FROM 8:30 -7:30 AM      INTERVAL HPI/OVERNIGHT EVENTS: Pt. seen and examined bedside, Reports difficulty sleeping last night and felt sob. Pain on posterior thigh 5/10. On inspection, dressing showed drainage with blood and abd pad was changed.    MEDICATIONS  (STANDING):  chlorhexidine 2% Cloths 1 Application(s) Topical <User Schedule>  enoxaparin Injectable 40 milliGRAM(s) SubCutaneous every 24 hours  levothyroxine 50 MICROGram(s) Oral daily  polyethylene glycol 3350 17 Gram(s) Oral daily  senna 2 Tablet(s) Oral at bedtime  vancomycin  IVPB 1250 milliGRAM(s) IV Intermittent every 12 hours    MEDICATIONS  (PRN):  acetaminophen     Tablet .. 650 milliGRAM(s) Oral every 6 hours PRN Temp greater or equal to 38C (100.4F), Mild Pain (1 - 3)      REVIEW OF SYSTEMS:  CONSTITUTIONAL: No fever, weight loss, or fatigue  HEENT: (+) Rt. ear pain and hearing diffculty  RESPIRATORY: No cough, wheezing, chills or hemoptysis; No shortness of breath  CARDIOVASCULAR: (+) chronic leg swelling. No chest pain, palpitations, dizziness  GASTROINTESTINAL: No abdominal pain. No nausea, vomiting, or hematemesis; No diarrhea or constipation. No melena or hematochezia.  GENITOURINARY: No dysuria or hematuria, urinary frequency  NEUROLOGICAL: No headaches, memory loss, loss of strength, numbness, or tremors  SKIN: (+) Wound on left thigh s/p debridement +dressing . No itching, burning      Vital Signs Last 24 Hrs  T(C): 36.6 (26 Jul 2023 05:31), Max: 36.6 (26 Jul 2023 05:31)  T(F): 97.8 (26 Jul 2023 05:31), Max: 97.8 (26 Jul 2023 05:31)  HR: 62 (26 Jul 2023 05:31) (62 - 71)  BP: 151/71 (26 Jul 2023 05:31) (132/67 - 151/71)  BP(mean): --  RR: 18 (26 Jul 2023 05:31) (17 - 20)  SpO2: 97% (26 Jul 2023 05:31) (96% - 100%)    Parameters below as of 26 Jul 2023 05:31  Patient On (Oxygen Delivery Method): room air        PHYSICAL EXAMINATION:  ENERAL: NAD, well built,   HEAD:  Atraumatic, Normocephalic  EYES:  conjunctiva and sclera clear  NECK: Supple, No JVD, Normal thyroid  CHEST/LUNG: Clear to auscultation. Clear to percussion bilaterally; No rales, rhonchi, wheezing, or rubs  HEART: S1, S2 Regular rate and rhythm; No rubs, or gallops  ABDOMEN: (+) Hernia LLQ. Soft, Nontender, Nondistended; Bowel sounds present  NERVOUS SYSTEM:  Alert & Oriented X3,    EXTREMITIES: (+) non-pitting edema in b/l LE's. +Cyst on dorsum of left foot. 2+ Peripheral Pulses, No clubbing, cyanosis  SKIN: (+) dressing intact on left thigh/buttock. Little drainage and blood from upon removal.                          11.3   6.61  )-----------( 306      ( 26 Jul 2023 08:23 )             35.6     07-26    140  |  109<H>  |  16  ----------------------------<  95  4.2   |  25  |  0.57    Ca    8.7      26 Jul 2023 08:23  Phos  3.0     07-26  Mg     2.5     07-26                CAPILLARY BLOOD GLUCOSE      RADIOLOGY & ADDITIONAL TESTS:                   PGY-1 Progress Note discussed with attending    PAGER #: [[phone]] TILL 5:00 PM  PLEASE CONTACT ON CALL TEAM:  - On Call Team (Please refer to Hannah) FROM 5:00 PM - 8:30PM  - Nightfloat Team FROM 8:30 -7:30 AM      INTERVAL HPI/OVERNIGHT EVENTS: No overnight events. Pt. seen and examined bedside, Reports difficulty sleeping last night and felt sob. Pain on posterior thigh 5/10. On inspection, dressing showed drainage with blood and abd pad was changed 7/26.    MEDICATIONS  (STANDING):  chlorhexidine 2% Cloths 1 Application(s) Topical <User Schedule>  enoxaparin Injectable 40 milliGRAM(s) SubCutaneous every 24 hours  levothyroxine 50 MICROGram(s) Oral daily  polyethylene glycol 3350 17 Gram(s) Oral daily  senna 2 Tablet(s) Oral at bedtime  vancomycin  IVPB 1250 milliGRAM(s) IV Intermittent every 12 hours    MEDICATIONS  (PRN):  acetaminophen     Tablet .. 650 milliGRAM(s) Oral every 6 hours PRN Temp greater or equal to 38C (100.4F), Mild Pain (1 - 3)      REVIEW OF SYSTEMS:  CONSTITUTIONAL: No fever, weight loss, or fatigue  HEENT: (+) Rt. ear pain and hearing diffculty  RESPIRATORY: No cough, wheezing, chills or hemoptysis; No shortness of breath  CARDIOVASCULAR: (+)chronic leg swelling. No chest pain, palpitations, dizziness  GASTROINTESTINAL: No abdominal pain. No nausea, vomiting, or hematemesis; No diarrhea or constipation. No melena or hematochezia.  GENITOURINARY: No dysuria or hematuria, urinary frequency  NEUROLOGICAL: No headaches, memory loss, loss of strength, numbness, or tremors  SKIN: (+)Wound on left thigh s/p debridement +dressing . No itching, burning      Vital Signs Last 24 Hrs  T(C): 36.6 (26 Jul 2023 05:31), Max: 36.6 (26 Jul 2023 05:31)  T(F): 97.8 (26 Jul 2023 05:31), Max: 97.8 (26 Jul 2023 05:31)  HR: 62 (26 Jul 2023 05:31) (62 - 71)  BP: 151/71 (26 Jul 2023 05:31) (132/67 - 151/71)  BP(mean): --  RR: 18 (26 Jul 2023 05:31) (17 - 20)  SpO2: 97% (26 Jul 2023 05:31) (96% - 100%)    Parameters below as of 26 Jul 2023 05:31  Patient On (Oxygen Delivery Method): room air        PHYSICAL EXAMINATION:  ENERAL: NAD, well built,   HEAD:  Atraumatic, Normocephalic  EYES:  conjunctiva and sclera clear  NECK: Supple, No JVD, Normal thyroid  CHEST/LUNG: Clear to auscultation. Clear to percussion bilaterally; No rales, rhonchi, wheezing, or rubs  HEART: S1, S2 Regular rate and rhythm; No rubs, or gallops  ABDOMEN: (+) Hernia LLQ. Soft, Nontender, Nondistended; Bowel sounds present  NERVOUS SYSTEM:  Alert & Oriented X3,    EXTREMITIES: (+) non-pitting edema in b/l LE's. +Cyst on dorsum of left foot. 2+ Peripheral Pulses, No clubbing, cyanosis  SKIN: (+) dressing intact on left thigh/buttock. blood from upon removal.                          11.3   6.61  )-----------( 306      ( 26 Jul 2023 08:23 )             35.6     07-26    140  |  109<H>  |  16  ----------------------------<  95  4.2   |  25  |  0.57    Ca    8.7      26 Jul 2023 08:23  Phos  3.0     07-26  Mg     2.5     07-26                CAPILLARY BLOOD GLUCOSE      RADIOLOGY & ADDITIONAL TESTS:

## 2023-07-26 NOTE — PROGRESS NOTE ADULT - ASSESSMENT
This is an 86 y/o female from home with Pmhx of HTN, PAD with left LE stent(sep 22), endometrial ca with JENNIFER lung mets s/p resection, chemo, and radiotherapy presenting to the hospital with left thigh swelling. Admitted for cellulitis with abscess to left upper thigh, for I&D in OR on 7/22.

## 2023-07-26 NOTE — PROGRESS NOTE ADULT - PROBLEM SELECTOR PLAN 4
7/23 murmur heard incidentally on physical exam  -Echo (07/24):  1. Moderate posterior mitral annular calcification; normal  mitral valve leaflets. Trace mitral regurgitation.  2. Calcified trileaflet aortic valve.  No aortic stenosis.  No aortic valve regurgitation seen.  3. Moderately increased left ventricular wall thickness.  Differential includes hypertensive cardiomyopathy,  infiltrative cardiomyopathy, and hypertrophic  cardiomyopathy, among others. Consider cardiac MRI if  clinically indicated.  4. Endocardium not well visualized; normal left ventricular  systolic function (LVEF 55-60% by visual estimation). Not  all LV wall segments were seen.  5. Grade I diastolic dysfunction (Impaired relaxation,  mild).  6. Normal right ventricular size and systolic function  (TAPSE 2.1 cm). 7/23 murmur heard incidentally on physical exam  -Echo (07/24): Trace mitral regurgitation.

## 2023-07-26 NOTE — PROGRESS NOTE ADULT - ATTENDING COMMENTS
86 y/o female from home with Pmhx of HTN, PAD with left LE stent(sep 22), endometrial ca with JENNIFER lung mets s/p resection, chemo, and radiotherapy presenting to the hospital with left thigh swelling. Admitted for cellulitis with abscess to left upper thigh, for I&D in OR on 7/22.      Patient was seen and examined, feels well. Still w/ oozing purulent discharge from the wound     labs reviewed- cbc, bmp, mg, po4     PE as above       A/P:  #Left thigh abscess s/p I&D 7/22   #MRSA infection   #HTN  #PAD s/p stent   #Endometrial ca w/ lung mets - s/p chemo/radiation   #DVT ppx     Plan:  -C/w IV vancomycin for now, check VT.  Will switch to PO doxy upon DC per ID recs.   -D/w surgery service, appreciate WCO-" change packing daily with sterile wet to dry Kerlix, cover with dry gauze and Medipore. Pt may shower prior to dressing changes and irrigate wound. Pt will need a visiting nurse to help with dressing changes upon discharge"  -C/w levothyroxine.   -Pt w/ hx of PAD s/p stents...  -Medically stable for DC home w/ PO abx and VNS. Will need out-patient f/u w/ surgery service- Dr. Howard.   -C/w Epi CASTILLO . 88 y/o female from home with Pmhx of HTN, PAD with left LE stent(sep 22), endometrial ca with JENNIFER lung mets s/p resection, chemo, and radiotherapy presenting to the hospital with left thigh swelling. Admitted for cellulitis with abscess to left upper thigh, for I&D in OR on 7/22.      Patient was seen and examined, feels well. Still w/ oozing purulent discharge from the wound     labs reviewed- cbc, bmp, mg, po4     PE as above       A/P:  #Left thigh abscess s/p I&D 7/22   #MRSA infection   #HTN  #PAD s/p stent   #Endometrial ca w/ lung mets - s/p chemo/radiation   #DVT ppx     Plan:  -C/w IV vancomycin for now, check VT.  Will switch to PO doxy upon DC per ID recs.   -D/w surgery service, appreciate WCO-" change packing daily with sterile wet to dry Kerlix, cover with dry gauze and Medipore. Pt may shower prior to dressing changes and irrigate wound. Pt will need a visiting nurse to help with dressing changes upon discharge"  -C/w levothyroxine.   -Pt w/ hx of PAD s/p stents...  -Medically stable for DC home w/ PO abx and VNS. Will need out-patient f/u w/ surgery service- Dr. Howard. Appointment made for 08/10/2023 at 11:45am   -C/w Epi CASTILLO .

## 2023-07-26 NOTE — PROGRESS NOTE ADULT - PROBLEM SELECTOR PLAN 5
hx hypothyroid on levothyroxine 50mcg  cont home med  f/u TSH. hx hypothyroid on levothyroxine 50mcg  cont home med  TSH: 1.74

## 2023-07-26 NOTE — PROGRESS NOTE ADULT - PROBLEM SELECTOR PLAN 1
p/w left posterior upper thight cellulitis and abscess  CT w/ IV cont LLE: cellulitis post medial proximal and mid left thigh 2.5 x 2.1 x 3  7/23 ESR-52; CRP-104    I&D (07/22/23)  Intra-op CX: MRSA (+)    - DC ceftriaxone, c/w vancomycin, and switch to bactrim once discharged  - PT recom'd home pt  - Tylenol for pain.  - Outpatient wound care p/w left posterior upper thight cellulitis and abscess  CT w/ IV cont LLE: cellulitis post medial proximal and mid left thigh 2.5 x 2.1 x 3  7/23 ESR-52; CRP-104  I&D (07/22/23)  Intra-op CX: MRSA (+)    - c/w vancomycin, and switch to doxycycline once discharged for 7 days.  - PT recom'd home pt  - Tylenol for pain.  - Outpatient wound care

## 2023-07-27 LAB
CULTURE RESULTS: SIGNIFICANT CHANGE UP
ORGANISM # SPEC MICROSCOPIC CNT: SIGNIFICANT CHANGE UP
ORGANISM # SPEC MICROSCOPIC CNT: SIGNIFICANT CHANGE UP
SPECIMEN SOURCE: SIGNIFICANT CHANGE UP
VANCOMYCIN TROUGH SERPL-MCNC: 9.7 UG/ML — LOW (ref 10–20)

## 2023-07-27 PROCEDURE — 99233 SBSQ HOSP IP/OBS HIGH 50: CPT | Mod: GC

## 2023-07-27 RX ORDER — VANCOMYCIN HCL 1 G
1500 VIAL (EA) INTRAVENOUS EVERY 12 HOURS
Refills: 0 | Status: DISCONTINUED | OUTPATIENT
Start: 2023-07-27 | End: 2023-07-29

## 2023-07-27 RX ORDER — KETOROLAC TROMETHAMINE 30 MG/ML
30 SYRINGE (ML) INJECTION ONCE
Refills: 0 | Status: DISCONTINUED | OUTPATIENT
Start: 2023-07-27 | End: 2023-07-27

## 2023-07-27 RX ORDER — GUAIFENESIN/DEXTROMETHORPHAN 600MG-30MG
200 TABLET, EXTENDED RELEASE 12 HR ORAL ONCE
Refills: 0 | Status: COMPLETED | OUTPATIENT
Start: 2023-07-27 | End: 2023-07-27

## 2023-07-27 RX ORDER — LANOLIN ALCOHOL/MO/W.PET/CERES
3 CREAM (GRAM) TOPICAL ONCE
Refills: 0 | Status: COMPLETED | OUTPATIENT
Start: 2023-07-27 | End: 2023-07-27

## 2023-07-27 RX ORDER — GUAIFENESIN/DEXTROMETHORPHAN 600MG-30MG
200 TABLET, EXTENDED RELEASE 12 HR ORAL ONCE
Refills: 0 | Status: DISCONTINUED | OUTPATIENT
Start: 2023-07-27 | End: 2023-07-27

## 2023-07-27 RX ADMIN — Medication 650 MILLIGRAM(S): at 22:03

## 2023-07-27 RX ADMIN — Medication 50 MICROGRAM(S): at 06:10

## 2023-07-27 RX ADMIN — Medication 30 MILLIGRAM(S): at 16:47

## 2023-07-27 RX ADMIN — SENNA PLUS 2 TABLET(S): 8.6 TABLET ORAL at 21:29

## 2023-07-27 RX ADMIN — CHLORHEXIDINE GLUCONATE 1 APPLICATION(S): 213 SOLUTION TOPICAL at 06:13

## 2023-07-27 RX ADMIN — ENOXAPARIN SODIUM 40 MILLIGRAM(S): 100 INJECTION SUBCUTANEOUS at 08:00

## 2023-07-27 RX ADMIN — Medication 3 MILLIGRAM(S): at 22:02

## 2023-07-27 RX ADMIN — Medication 200 MILLILITER(S): at 06:01

## 2023-07-27 RX ADMIN — Medication 300 MILLIGRAM(S): at 18:05

## 2023-07-27 RX ADMIN — Medication 650 MILLIGRAM(S): at 23:00

## 2023-07-27 RX ADMIN — Medication 166.67 MILLIGRAM(S): at 06:10

## 2023-07-27 NOTE — PROGRESS NOTE ADULT - ATTENDING COMMENTS
86 y/o female from home with Pmhx of HTN, PAD with left LE stent(sep 22), endometrial ca with JENNIFER lung mets s/p resection, chemo, and radiotherapy presenting to the hospital with left thigh swelling. Admitted for cellulitis with abscess to left upper thigh, for I&D in OR on 7/22.      Patient was seen and examined, feels well. Still w/ oozing purulent discharge from the wound     labs reviewed- cbc, bmp, mg, po4 , VT     PE as above-   induration lesser now       A/P:  #Left thigh abscess s/p I&D 7/22   #MRSA infection   #HTN  #PAD s/p stent   #Endometrial ca w/ lung mets - s/p chemo/radiation   #DVT ppx     Plan:  -C/w IV vancomycin for now, dose adjusted based on VT. Will switch to PO doxy upon DC per ID recs.   -D/w surgery service, appreciate WCO-" change packing daily with sterile wet to dry Kerlix, cover with dry gauze and Medipore. Pt may shower prior to dressing changes and irrigate wound"  -C/w levothyroxine.   -Pt w/ hx of PAD s/p stents, not on any asa/plavix. Spoke w/ patient who confirmed that her last stent was placed in September'22. Will reach out to her primary vascular surgeon.   -Medically stable for DC for several days, initial plan for DC home w/ VNS and wound care. However, per daughter she won't be able to carry out daily wound care and given location (buttock), patient herself can't perform wound care. Now pending SARINA, choices given.   - Will need out-patient f/u w/ surgery service- Dr. Howard. Appointment made for 08/10/2023 at 11:45am   -Out-patient GI f/u as well   -C/w Lovenox SC .

## 2023-07-27 NOTE — PROGRESS NOTE ADULT - SUBJECTIVE AND OBJECTIVE BOX
PGY-1 Progress Note discussed with attending    PAGER #: [[phone]] TILL 5:00 PM  PLEASE CONTACT ON CALL TEAM:  - On Call Team (Please refer to Hannah) FROM 5:00 PM - 8:30PM  - Nightfloat Team FROM 8:30 -7:30 AM      INTERVAL HPI/OVERNIGHT EVENTS: No overnight events. Patients was sitting comfortably in chair, she complains of mild tolerable pain at site of I&D ( L upper thigh). Post-op day 5. Patient's vanc troug was 9.7, increased to higher dose vancomycin 1500.      MEDICATIONS  (STANDING):  chlorhexidine 2% Cloths 1 Application(s) Topical <User Schedule>  enoxaparin Injectable 40 milliGRAM(s) SubCutaneous every 24 hours  levothyroxine 50 MICROGram(s) Oral daily  polyethylene glycol 3350 17 Gram(s) Oral daily  senna 2 Tablet(s) Oral at bedtime  vancomycin  IVPB 1500 milliGRAM(s) IV Intermittent every 12 hours    MEDICATIONS  (PRN):  acetaminophen     Tablet .. 650 milliGRAM(s) Oral every 6 hours PRN Temp greater or equal to 38C (100.4F), Mild Pain (1 - 3)      REVIEW OF SYSTEMS:  CONSTITUTIONAL: No fever, weight loss, or fatigue  RESPIRATORY: No shortness of breath  CARDIOVASCULAR: No chest pain  GASTROINTESTINAL: No abdominal pain.  GENITOURINARY: No dysuria  NEUROLOGICAL: No headaches  SKIN: No itching, burning, rashes    Vital Signs Last 24 Hrs  T(C): 36.7 (27 Jul 2023 13:13), Max: 36.8 (26 Jul 2023 20:50)  T(F): 98 (27 Jul 2023 13:13), Max: 98.2 (26 Jul 2023 20:50)  HR: 64 (27 Jul 2023 13:13) (59 - 66)  BP: 137/61 (27 Jul 2023 13:13) (125/69 - 137/61)  BP(mean): --  RR: 16 (27 Jul 2023 13:13) (16 - 20)  SpO2: 98% (27 Jul 2023 13:13) (98% - 98%)    Parameters below as of 27 Jul 2023 13:13  Patient On (Oxygen Delivery Method): room air        PHYSICAL EXAMINATION:  GENERAL: NAD, well built  HEAD:  Atraumatic, Normocephalic  EYES:  conjunctiva and sclera clear  CHEST/LUNG: Clear to auscultation. No rales, rhonchi, wheezing, or rubs  HEART: Regular rate and rhythm; No murmurs, rubs, or gallops  ABDOMEN: Soft, Nontender, Nondistended; Bowel sounds present  NERVOUS SYSTEM:  Alert & Oriented X3,    EXTREMITIES:  2+ Peripheral Pulses, No clubbing, cyanosis, or edema  SKIN: warm dry                          11.3   6.61  )-----------( 306      ( 26 Jul 2023 08:23 )             35.6     07-26    140  |  109<H>  |  16  ----------------------------<  95  4.2   |  25  |  0.57    Ca    8.7      26 Jul 2023 08:23  Phos  3.0     07-26  Mg     2.5     07-26                CAPILLARY BLOOD GLUCOSE      RADIOLOGY & ADDITIONAL TESTS:

## 2023-07-27 NOTE — PROGRESS NOTE ADULT - PROBLEM SELECTOR PLAN 1
p/w left posterior upper thight cellulitis and abscess  CT w/ IV cont LLE: cellulitis post medial proximal and mid left thigh 2.5 x 2.1 x 3  7/23 ESR-52; CRP-104  I&D (07/22/23)  Intra-op CX: MRSA (+)    - vanc trough: 9.7 (7/27) increased vancomycin from 1250 to 1500. Switch to doxycycline once discharged for 7 days.  - PT recom'd home PT  - Tylenol for pain.  - Outpatient wound care  - Pt scheduled for op surgery follow up in August.

## 2023-07-27 NOTE — PROGRESS NOTE ADULT - SUBJECTIVE AND OBJECTIVE BOX
Surgery examined left posterior thigh wound at bedside.   Packing removed and exchanged for new kerlix. Wound bed with clean pink base with some sanguinous discharge. area medial to incision site no longer indurated, now soft and nontender to palpation; chelsie-incisional skin noted to be indurated as expected, but improved.   Repacked with kerlix, gauze and medipore dressing daily.   Detailed wound care orders in place.

## 2023-07-28 LAB
ANION GAP SERPL CALC-SCNC: 6 MMOL/L — SIGNIFICANT CHANGE UP (ref 5–17)
BUN SERPL-MCNC: 22 MG/DL — HIGH (ref 7–18)
CALCIUM SERPL-MCNC: 8.7 MG/DL — SIGNIFICANT CHANGE UP (ref 8.4–10.5)
CHLORIDE SERPL-SCNC: 109 MMOL/L — HIGH (ref 96–108)
CO2 SERPL-SCNC: 27 MMOL/L — SIGNIFICANT CHANGE UP (ref 22–31)
CREAT SERPL-MCNC: 0.55 MG/DL — SIGNIFICANT CHANGE UP (ref 0.5–1.3)
EGFR: 90 ML/MIN/1.73M2 — SIGNIFICANT CHANGE UP
GLUCOSE SERPL-MCNC: 99 MG/DL — SIGNIFICANT CHANGE UP (ref 70–99)
HCT VFR BLD CALC: 35.4 % — SIGNIFICANT CHANGE UP (ref 34.5–45)
HGB BLD-MCNC: 11 G/DL — LOW (ref 11.5–15.5)
MAGNESIUM SERPL-MCNC: 2.4 MG/DL — SIGNIFICANT CHANGE UP (ref 1.6–2.6)
MCHC RBC-ENTMCNC: 29.6 PG — SIGNIFICANT CHANGE UP (ref 27–34)
MCHC RBC-ENTMCNC: 31.1 GM/DL — LOW (ref 32–36)
MCV RBC AUTO: 95.4 FL — SIGNIFICANT CHANGE UP (ref 80–100)
NRBC # BLD: 0 /100 WBCS — SIGNIFICANT CHANGE UP (ref 0–0)
PHOSPHATE SERPL-MCNC: 3.7 MG/DL — SIGNIFICANT CHANGE UP (ref 2.5–4.5)
PLATELET # BLD AUTO: 286 K/UL — SIGNIFICANT CHANGE UP (ref 150–400)
POTASSIUM SERPL-MCNC: 4.3 MMOL/L — SIGNIFICANT CHANGE UP (ref 3.5–5.3)
POTASSIUM SERPL-SCNC: 4.3 MMOL/L — SIGNIFICANT CHANGE UP (ref 3.5–5.3)
RBC # BLD: 3.71 M/UL — LOW (ref 3.8–5.2)
RBC # FLD: 12.6 % — SIGNIFICANT CHANGE UP (ref 10.3–14.5)
SODIUM SERPL-SCNC: 142 MMOL/L — SIGNIFICANT CHANGE UP (ref 135–145)
VANCOMYCIN TROUGH SERPL-MCNC: 23.1 UG/ML — HIGH (ref 10–20)
WBC # BLD: 7.51 K/UL — SIGNIFICANT CHANGE UP (ref 3.8–10.5)
WBC # FLD AUTO: 7.51 K/UL — SIGNIFICANT CHANGE UP (ref 3.8–10.5)

## 2023-07-28 PROCEDURE — 99232 SBSQ HOSP IP/OBS MODERATE 35: CPT | Mod: GC

## 2023-07-28 RX ORDER — CALAMINE AND ZINC OXIDE AND PHENOL 160; 10 MG/ML; MG/ML
1 LOTION TOPICAL
Refills: 0 | Status: DISCONTINUED | OUTPATIENT
Start: 2023-07-28 | End: 2023-07-31

## 2023-07-28 RX ORDER — LANOLIN ALCOHOL/MO/W.PET/CERES
3 CREAM (GRAM) TOPICAL ONCE
Refills: 0 | Status: COMPLETED | OUTPATIENT
Start: 2023-07-28 | End: 2023-07-28

## 2023-07-28 RX ADMIN — Medication 100 MILLIGRAM(S): at 00:05

## 2023-07-28 RX ADMIN — Medication 50 MICROGRAM(S): at 06:04

## 2023-07-28 RX ADMIN — ENOXAPARIN SODIUM 40 MILLIGRAM(S): 100 INJECTION SUBCUTANEOUS at 06:31

## 2023-07-28 RX ADMIN — Medication 3 MILLIGRAM(S): at 23:50

## 2023-07-28 RX ADMIN — CALAMINE AND ZINC OXIDE AND PHENOL 1 APPLICATION(S): 160; 10 LOTION TOPICAL at 11:37

## 2023-07-28 RX ADMIN — Medication 300 MILLIGRAM(S): at 06:04

## 2023-07-28 RX ADMIN — CHLORHEXIDINE GLUCONATE 1 APPLICATION(S): 213 SOLUTION TOPICAL at 06:09

## 2023-07-28 RX ADMIN — POLYETHYLENE GLYCOL 3350 17 GRAM(S): 17 POWDER, FOR SOLUTION ORAL at 11:37

## 2023-07-28 NOTE — DIETITIAN INITIAL EVALUATION ADULT - PERTINENT LABORATORY DATA
07-28    142  |  109<H>  |  22<H>  ----------------------------<  99  4.3   |  27  |  0.55    Ca    8.7      28 Jul 2023 05:54  Phos  3.7     07-28  Mg     2.4     07-28

## 2023-07-28 NOTE — PROGRESS NOTE ADULT - ASSESSMENT
87F s/p I & D of left thigh abscess 7/22     PLAN  - Local wound care  - Packing removed and exchanged for new kerlix. Wound bed with clean pink base with some sanguinous discharge. area medial to incision site no longer indurated, now soft and nontender to palpation; chelsie-incisional skin noted to be indurated as expected, but improved.   - Detailed wound care orders in place   - Abx per ID  - D/c planning per primary team; pt will require VNS or SARINA for wound care upon dc

## 2023-07-28 NOTE — DIETITIAN INITIAL EVALUATION ADULT - PROBLEM SELECTOR PLAN 1
p/w left posterior upper thight cellulitis and abscess  - vitals: T: 98, HR: 79, BP: 132/75 mmHg, 96% on RA  - CT w/ IV cont LLE: cellulitis post medial proximal and mid left thigh 2.5 x 2.1 x 3  - s/p Vanc in ED  - Surgery consulted, pt for I&D in OR  - Start vancomycin and ceftriaxone  - f/u intra-op wound cultures, follow MRSA swab  - f/u ESR, CRP  - PT Eval   - Tylenol and ketorolac for pain  - ID Dr. Boggs

## 2023-07-28 NOTE — DIETITIAN INITIAL EVALUATION ADULT - OTHER INFO
reports losing intentionally 18kg from usual in 3 years . UBW not available . reports difficulty of swallowing at times but ensure helps with food going down. will add ensure enlive bid To diet. Has No food allergies

## 2023-07-28 NOTE — PROGRESS NOTE ADULT - PROBLEM SELECTOR PLAN 1
p/w left posterior upper thight cellulitis and abscess  CT w/ IV cont LLE: cellulitis post medial proximal and mid left thigh 2.5 x 2.1 x 3  7/23 ESR-52; CRP-104  I&D (07/22/23)  Intra-op CX: MRSA (+)    - c/w vancomycin, and switch to doxycycline once discharged for 7 days.  - PT recom'd home pt  - Tylenol for pain.  - Outpatient wound care. p/w left posterior upper thight cellulitis and abscess  CT w/ IV cont LLE: cellulitis post medial proximal and mid left thigh 2.5 x 2.1 x 3  7/23 ESR-52; CRP-104  I&D (07/22/23)  Intra-op CX: MRSA (+)  - c/w vancomycin, and switch to doxycycline once discharged for 7 days.  - PT recom'd home pt  - Tylenol for pain.  - Outpatient wound care.  Dressing was last changed on 7/28/23.

## 2023-07-28 NOTE — PROGRESS NOTE ADULT - SUBJECTIVE AND OBJECTIVE BOX
PGY-1 Progress Note discussed with attending    INTERVAL HPI/OVERNIGHT EVENTS: Pt. seen and examined bedside. Notes knot, below right breast, that pains when touched. No pain at wound site, left posterior thigh, unless pressured. Reports no overnight events, and is sleeping well with melatonin.       REVIEW OF SYSTEMS:  CONSTITUTIONAL: No fever, weight loss, or fatigue  RESPIRATORY: No cough, wheezing, chills or hemoptysis; No shortness of breath  CARDIOVASCULAR: No chest pain, palpitations, dizziness, or leg swelling  GASTROINTESTINAL: No abdominal pain. No nausea, vomiting, or hematemesis; No diarrhea or constipation. No melena or hematochezia.  GENITOURINARY: No dysuria or hematuria, urinary frequency  NEUROLOGICAL: No headaches, memory loss, loss of strength, numbness, or tremors  SKIN: No itching, burning, rashes, or lesions     MEDICATIONS  (STANDING):  chlorhexidine 2% Cloths 1 Application(s) Topical <User Schedule>  enoxaparin Injectable 40 milliGRAM(s) SubCutaneous every 24 hours  levothyroxine 50 MICROGram(s) Oral daily  polyethylene glycol 3350 17 Gram(s) Oral daily  senna 2 Tablet(s) Oral at bedtime  vancomycin  IVPB 1500 milliGRAM(s) IV Intermittent every 12 hours    MEDICATIONS  (PRN):  acetaminophen     Tablet .. 650 milliGRAM(s) Oral every 6 hours PRN Temp greater or equal to 38C (100.4F), Mild Pain (1 - 3)  calamine/zinc oxide Lotion 1 Application(s) Topical four times a day PRN Itching      Vital Signs Last 24 Hrs  T(C): 36.7 (28 Jul 2023 05:10), Max: 36.7 (27 Jul 2023 21:09)  T(F): 98 (28 Jul 2023 05:10), Max: 98.1 (27 Jul 2023 21:09)  HR: 65 (28 Jul 2023 05:10) (65 - 81)  BP: 122/64 (28 Jul 2023 05:10) (122/64 - 126/71)  BP(mean): --  RR: 16 (28 Jul 2023 05:10) (16 - 18)  SpO2: 96% (28 Jul 2023 05:10) (95% - 96%)    Parameters below as of 28 Jul 2023 05:10  Patient On (Oxygen Delivery Method): room air        PHYSICAL EXAMINATION:  GENERAL: NAD, well appearing  HEAD:  Atraumatic, Normocephalic  EYES:  conjunctiva and sclera clear  NECK: Supple, No JVD, Normal thyroid  CHEST/LUNG: Clear to auscultation. Clear to percussion bilaterally; No rales, rhonchi, wheezing, or rubs  HEART: S1, S2 Regular rate and rhythm; No rubs, or gallops  ABDOMEN: (+) Hernia LLQ. Soft, Nontender, Nondistended; Bowel sounds present  NERVOUS SYSTEM:  Alert & Oriented X3,    EXTREMITIES: (+) non-pitting edema in b/l LE's. +Cyst on dorsum of left foot. 2+ Peripheral Pulses, No clubbing, cyanosis  SKIN: (+) dressing intact on left thigh/buttock. No erythema.                          11.0   7.51  )-----------( 286      ( 28 Jul 2023 05:54 )             35.4     07-28    142  |  109<H>  |  22<H>  ----------------------------<  99  4.3   |  27  |  0.55    Ca    8.7      28 Jul 2023 05:54  Phos  3.7     07-28  Mg     2.4     07-28                I&O's Summary          CAPILLARY BLOOD GLUCOSE      RADIOLOGY & ADDITIONAL TESTS:                       PGY-1 Progress Note discussed with attending    INTERVAL HPI/OVERNIGHT EVENTS: No overnight events, she was able to sleep well. Pt. seen and examined bedside. Patient mentions mild tolerable pain at the site of procedure. Today wound was re-packed with kerlix, gauze and abd pad. Notes knot, below right breast, that's tender to deep palpation.      REVIEW OF SYSTEMS:  CONSTITUTIONAL: No fever, weight loss, or fatigue  RESPIRATORY: No cough, wheezing, chills or hemoptysis; No shortness of breath  CARDIOVASCULAR: No chest pain, palpitations, dizziness, or leg swelling  GASTROINTESTINAL: No abdominal pain. No nausea, vomiting, or hematemesis; No diarrhea or constipation. No melena or hematochezia.  GENITOURINARY: No dysuria or hematuria, urinary frequency  NEUROLOGICAL: No headaches, memory loss, loss of strength, numbness, or tremors  SKIN: No itching, burning, rashes, or lesions     MEDICATIONS  (STANDING):  chlorhexidine 2% Cloths 1 Application(s) Topical <User Schedule>  enoxaparin Injectable 40 milliGRAM(s) SubCutaneous every 24 hours  levothyroxine 50 MICROGram(s) Oral daily  polyethylene glycol 3350 17 Gram(s) Oral daily  senna 2 Tablet(s) Oral at bedtime  vancomycin  IVPB 1500 milliGRAM(s) IV Intermittent every 12 hours    MEDICATIONS  (PRN):  acetaminophen     Tablet .. 650 milliGRAM(s) Oral every 6 hours PRN Temp greater or equal to 38C (100.4F), Mild Pain (1 - 3)  calamine/zinc oxide Lotion 1 Application(s) Topical four times a day PRN Itching      Vital Signs Last 24 Hrs  T(C): 36.7 (28 Jul 2023 05:10), Max: 36.7 (27 Jul 2023 21:09)  T(F): 98 (28 Jul 2023 05:10), Max: 98.1 (27 Jul 2023 21:09)  HR: 65 (28 Jul 2023 05:10) (65 - 81)  BP: 122/64 (28 Jul 2023 05:10) (122/64 - 126/71)  BP(mean): --  RR: 16 (28 Jul 2023 05:10) (16 - 18)  SpO2: 96% (28 Jul 2023 05:10) (95% - 96%)    Parameters below as of 28 Jul 2023 05:10  Patient On (Oxygen Delivery Method): room air        PHYSICAL EXAMINATION:  GENERAL: NAD, well appearing  HEAD:  Atraumatic, Normocephalic  EYES:  conjunctiva and sclera clear  NECK: Supple, No JVD, Normal thyroid  CHEST/LUNG: Clear to auscultation. Clear to percussion bilaterally; No rales, rhonchi, wheezing, or rubs  HEART: S1, S2 Regular rate and rhythm; No rubs, or gallops  ABDOMEN: (+) Hernia LLQ. Soft, Nontender, Nondistended; Bowel sounds present  NERVOUS SYSTEM:  Alert & Oriented X3,    EXTREMITIES: (+)non-pitting edema in b/l LE's. +Cyst on dorsum of left foot. 2+ Peripheral Pulses, No clubbing, cyanosis  SKIN: (+) dressing intact on left thigh/buttock. No erythema.                          11.0   7.51  )-----------( 286      ( 28 Jul 2023 05:54 )             35.4     07-28    142  |  109<H>  |  22<H>  ----------------------------<  99  4.3   |  27  |  0.55    Ca    8.7      28 Jul 2023 05:54  Phos  3.7     07-28  Mg     2.4     07-28                I&O's Summary          CAPILLARY BLOOD GLUCOSE      RADIOLOGY & ADDITIONAL TESTS:

## 2023-07-28 NOTE — PROGRESS NOTE ADULT - SUBJECTIVE AND OBJECTIVE BOX
INTERVAL HPI/OVERNIGHT EVENTS: NAEO. AVSS. Patient seen and examined at bedside. C/o intermittent pain around wound site and itching, Denies fevers, chills.     Vital Signs Last 24 Hrs  T(C): 36.7 (28 Jul 2023 05:10), Max: 36.7 (27 Jul 2023 13:13)  T(F): 98 (28 Jul 2023 05:10), Max: 98.1 (27 Jul 2023 21:09)  HR: 65 (28 Jul 2023 05:10) (64 - 81)  BP: 122/64 (28 Jul 2023 05:10) (122/64 - 137/61)  BP(mean): --  RR: 16 (28 Jul 2023 05:10) (16 - 18)  SpO2: 96% (28 Jul 2023 05:10) (95% - 98%)    Parameters below as of 28 Jul 2023 05:10  Patient On (Oxygen Delivery Method): room air      I&O's Detail    polyethylene glycol 3350 17 Gram(s) Oral daily  senna 2 Tablet(s) Oral at bedtime  vancomycin  IVPB 1500 milliGRAM(s) IV Intermittent every 12 hours      Physical Exam:  General: AAOx3, No acute distress  HEENT: NC/AT, trachea midline  Respiratory: Nonlabored breathing, equal chest rise b/l   Skin: No jaundice, no icterus  Abdomen: soft,  nondistended, nontender, no rebound tenderness  Extremities: L thigh wound clean, dry, no erythema around wound edges, no induration, no purulence expressed. No fluctuance appreciated. Packing removed, wound base pink, no active bleeding, re-packed with kerlix, gauze and abd pad.       Lines/Drains/Tubes:     Drains/Tubes:       Labs:                        11.0   7.51  )-----------( 286      ( 28 Jul 2023 05:54 )             35.4     07-28    142  |  109<H>  |  22<H>  ----------------------------<  99  4.3   |  27  |  0.55    Ca    8.7      28 Jul 2023 05:54  Phos  3.7     07-28  Mg     2.4     07-28          RADIOLOGY & ADDITIONAL STUDIES:

## 2023-07-28 NOTE — ADVANCED PRACTICE NURSE CONSULT - ASSESSMENT
This is a 87yr old female patient admitted for Cellulitis, presenting with a L. Post Thigh Abscess, s/p I&D, to which the patient is being followed by Surgery with a treatment plan in place to address this issue. There is currently no further need for wound care specialist consultation at this time

## 2023-07-28 NOTE — DIETITIAN INITIAL EVALUATION ADULT - PERTINENT MEDS FT
MEDICATIONS  (STANDING):  chlorhexidine 2% Cloths 1 Application(s) Topical <User Schedule>  enoxaparin Injectable 40 milliGRAM(s) SubCutaneous every 24 hours  levothyroxine 50 MICROGram(s) Oral daily  polyethylene glycol 3350 17 Gram(s) Oral daily  senna 2 Tablet(s) Oral at bedtime  vancomycin  IVPB 1500 milliGRAM(s) IV Intermittent every 12 hours    MEDICATIONS  (PRN):  acetaminophen     Tablet .. 650 milliGRAM(s) Oral every 6 hours PRN Temp greater or equal to 38C (100.4F), Mild Pain (1 - 3)  calamine/zinc oxide Lotion 1 Application(s) Topical four times a day PRN Itching

## 2023-07-29 LAB
ANION GAP SERPL CALC-SCNC: 4 MMOL/L — LOW (ref 5–17)
BUN SERPL-MCNC: 16 MG/DL — SIGNIFICANT CHANGE UP (ref 7–18)
CALCIUM SERPL-MCNC: 8.9 MG/DL — SIGNIFICANT CHANGE UP (ref 8.4–10.5)
CHLORIDE SERPL-SCNC: 110 MMOL/L — HIGH (ref 96–108)
CO2 SERPL-SCNC: 26 MMOL/L — SIGNIFICANT CHANGE UP (ref 22–31)
CREAT SERPL-MCNC: 0.6 MG/DL — SIGNIFICANT CHANGE UP (ref 0.5–1.3)
EGFR: 88 ML/MIN/1.73M2 — SIGNIFICANT CHANGE UP
GLUCOSE SERPL-MCNC: 123 MG/DL — HIGH (ref 70–99)
HCT VFR BLD CALC: 34.2 % — LOW (ref 34.5–45)
HGB BLD-MCNC: 10.8 G/DL — LOW (ref 11.5–15.5)
MAGNESIUM SERPL-MCNC: 2.4 MG/DL — SIGNIFICANT CHANGE UP (ref 1.6–2.6)
MCHC RBC-ENTMCNC: 30.3 PG — SIGNIFICANT CHANGE UP (ref 27–34)
MCHC RBC-ENTMCNC: 31.6 GM/DL — LOW (ref 32–36)
MCV RBC AUTO: 95.8 FL — SIGNIFICANT CHANGE UP (ref 80–100)
MRSA PCR RESULT.: DETECTED
NRBC # BLD: 0 /100 WBCS — SIGNIFICANT CHANGE UP (ref 0–0)
PHOSPHATE SERPL-MCNC: 3.2 MG/DL — SIGNIFICANT CHANGE UP (ref 2.5–4.5)
PLATELET # BLD AUTO: 275 K/UL — SIGNIFICANT CHANGE UP (ref 150–400)
POTASSIUM SERPL-MCNC: 4 MMOL/L — SIGNIFICANT CHANGE UP (ref 3.5–5.3)
POTASSIUM SERPL-SCNC: 4 MMOL/L — SIGNIFICANT CHANGE UP (ref 3.5–5.3)
RBC # BLD: 3.57 M/UL — LOW (ref 3.8–5.2)
RBC # FLD: 12.8 % — SIGNIFICANT CHANGE UP (ref 10.3–14.5)
S AUREUS DNA NOSE QL NAA+PROBE: DETECTED
SODIUM SERPL-SCNC: 140 MMOL/L — SIGNIFICANT CHANGE UP (ref 135–145)
WBC # BLD: 7.63 K/UL — SIGNIFICANT CHANGE UP (ref 3.8–10.5)
WBC # FLD AUTO: 7.63 K/UL — SIGNIFICANT CHANGE UP (ref 3.8–10.5)

## 2023-07-29 PROCEDURE — 99232 SBSQ HOSP IP/OBS MODERATE 35: CPT | Mod: GC

## 2023-07-29 RX ORDER — VANCOMYCIN HCL 1 G
1250 VIAL (EA) INTRAVENOUS EVERY 12 HOURS
Refills: 0 | Status: DISCONTINUED | OUTPATIENT
Start: 2023-07-29 | End: 2023-07-31

## 2023-07-29 RX ORDER — PANTOPRAZOLE SODIUM 20 MG/1
40 TABLET, DELAYED RELEASE ORAL
Refills: 0 | Status: DISCONTINUED | OUTPATIENT
Start: 2023-07-29 | End: 2023-07-31

## 2023-07-29 RX ORDER — LANOLIN ALCOHOL/MO/W.PET/CERES
5 CREAM (GRAM) TOPICAL AT BEDTIME
Refills: 0 | Status: DISCONTINUED | OUTPATIENT
Start: 2023-07-29 | End: 2023-07-31

## 2023-07-29 RX ADMIN — Medication 650 MILLIGRAM(S): at 21:20

## 2023-07-29 RX ADMIN — PANTOPRAZOLE SODIUM 40 MILLIGRAM(S): 20 TABLET, DELAYED RELEASE ORAL at 17:18

## 2023-07-29 RX ADMIN — Medication 166.67 MILLIGRAM(S): at 17:20

## 2023-07-29 RX ADMIN — Medication 50 MICROGRAM(S): at 06:26

## 2023-07-29 RX ADMIN — Medication 100 MILLIGRAM(S): at 22:50

## 2023-07-29 RX ADMIN — CHLORHEXIDINE GLUCONATE 1 APPLICATION(S): 213 SOLUTION TOPICAL at 06:24

## 2023-07-29 RX ADMIN — Medication 300 MILLIGRAM(S): at 06:27

## 2023-07-29 RX ADMIN — Medication 5 MILLIGRAM(S): at 22:50

## 2023-07-29 RX ADMIN — Medication 650 MILLIGRAM(S): at 20:28

## 2023-07-29 RX ADMIN — ENOXAPARIN SODIUM 40 MILLIGRAM(S): 100 INJECTION SUBCUTANEOUS at 07:56

## 2023-07-30 LAB
ANION GAP SERPL CALC-SCNC: 8 MMOL/L — SIGNIFICANT CHANGE UP (ref 5–17)
BUN SERPL-MCNC: 19 MG/DL — HIGH (ref 7–18)
CALCIUM SERPL-MCNC: 8.5 MG/DL — SIGNIFICANT CHANGE UP (ref 8.4–10.5)
CHLORIDE SERPL-SCNC: 109 MMOL/L — HIGH (ref 96–108)
CO2 SERPL-SCNC: 26 MMOL/L — SIGNIFICANT CHANGE UP (ref 22–31)
CREAT SERPL-MCNC: 0.58 MG/DL — SIGNIFICANT CHANGE UP (ref 0.5–1.3)
EGFR: 89 ML/MIN/1.73M2 — SIGNIFICANT CHANGE UP
GLUCOSE SERPL-MCNC: 114 MG/DL — HIGH (ref 70–99)
HCT VFR BLD CALC: 33.1 % — LOW (ref 34.5–45)
HGB BLD-MCNC: 10.4 G/DL — LOW (ref 11.5–15.5)
MAGNESIUM SERPL-MCNC: 2.3 MG/DL — SIGNIFICANT CHANGE UP (ref 1.6–2.6)
MCHC RBC-ENTMCNC: 29.6 PG — SIGNIFICANT CHANGE UP (ref 27–34)
MCHC RBC-ENTMCNC: 31.4 GM/DL — LOW (ref 32–36)
MCV RBC AUTO: 94.3 FL — SIGNIFICANT CHANGE UP (ref 80–100)
NRBC # BLD: 0 /100 WBCS — SIGNIFICANT CHANGE UP (ref 0–0)
PHOSPHATE SERPL-MCNC: 3.2 MG/DL — SIGNIFICANT CHANGE UP (ref 2.5–4.5)
PLATELET # BLD AUTO: 286 K/UL — SIGNIFICANT CHANGE UP (ref 150–400)
POTASSIUM SERPL-MCNC: 3.9 MMOL/L — SIGNIFICANT CHANGE UP (ref 3.5–5.3)
POTASSIUM SERPL-SCNC: 3.9 MMOL/L — SIGNIFICANT CHANGE UP (ref 3.5–5.3)
RBC # BLD: 3.51 M/UL — LOW (ref 3.8–5.2)
RBC # FLD: 13 % — SIGNIFICANT CHANGE UP (ref 10.3–14.5)
SODIUM SERPL-SCNC: 143 MMOL/L — SIGNIFICANT CHANGE UP (ref 135–145)
VANCOMYCIN TROUGH SERPL-MCNC: 23 UG/ML — HIGH (ref 10–20)
WBC # BLD: 7.55 K/UL — SIGNIFICANT CHANGE UP (ref 3.8–10.5)
WBC # FLD AUTO: 7.55 K/UL — SIGNIFICANT CHANGE UP (ref 3.8–10.5)

## 2023-07-30 PROCEDURE — 99233 SBSQ HOSP IP/OBS HIGH 50: CPT | Mod: GC

## 2023-07-30 RX ORDER — ASPIRIN/CALCIUM CARB/MAGNESIUM 324 MG
81 TABLET ORAL DAILY
Refills: 0 | Status: DISCONTINUED | OUTPATIENT
Start: 2023-07-30 | End: 2023-07-31

## 2023-07-30 RX ADMIN — Medication 650 MILLIGRAM(S): at 23:55

## 2023-07-30 RX ADMIN — CHLORHEXIDINE GLUCONATE 1 APPLICATION(S): 213 SOLUTION TOPICAL at 06:42

## 2023-07-30 RX ADMIN — Medication 200 MILLIGRAM(S): at 22:54

## 2023-07-30 RX ADMIN — Medication 50 MICROGRAM(S): at 06:42

## 2023-07-30 RX ADMIN — Medication 166.67 MILLIGRAM(S): at 06:59

## 2023-07-30 RX ADMIN — PANTOPRAZOLE SODIUM 40 MILLIGRAM(S): 20 TABLET, DELAYED RELEASE ORAL at 06:42

## 2023-07-30 RX ADMIN — PANTOPRAZOLE SODIUM 40 MILLIGRAM(S): 20 TABLET, DELAYED RELEASE ORAL at 17:46

## 2023-07-30 RX ADMIN — Medication 81 MILLIGRAM(S): at 18:04

## 2023-07-30 RX ADMIN — Medication 650 MILLIGRAM(S): at 22:55

## 2023-07-30 RX ADMIN — Medication 5 MILLIGRAM(S): at 22:53

## 2023-07-30 RX ADMIN — ENOXAPARIN SODIUM 40 MILLIGRAM(S): 100 INJECTION SUBCUTANEOUS at 06:59

## 2023-07-30 NOTE — PROGRESS NOTE ADULT - PROBLEM SELECTOR PROBLEM 1
Abscess of skin

## 2023-07-30 NOTE — PROGRESS NOTE ADULT - PROVIDER SPECIALTY LIST ADULT
Surgery
Surgery
Infectious Disease
Internal Medicine
Internal Medicine
Surgery
Internal Medicine
independent
Internal Medicine
Internal Medicine

## 2023-07-30 NOTE — PROGRESS NOTE ADULT - PROBLEM SELECTOR PROBLEM 2
Cellulitis of skin

## 2023-07-30 NOTE — PROGRESS NOTE ADULT - REASON FOR ADMISSION
Abscess and cellulitis of left thigh

## 2023-07-30 NOTE — PROGRESS NOTE ADULT - PROBLEM SELECTOR PLAN 8
DVT ppx with Lovenox.
hx PAD s/p stent in LLE in sept 22.
DVT ppx with Lovenox

## 2023-07-30 NOTE — PROGRESS NOTE ADULT - PROBLEM SELECTOR PLAN 1
p/w left posterior upper thight cellulitis and abscess  CT w/ IV cont LLE: cellulitis post medial proximal and mid left thigh 2.5 x 2.1 x 3  7/23 ESR-52; CRP-104  I&D (07/22/23)  Intra-op CX: MRSA (+)  - c/w vancomycin, and switch to doxycycline once discharged for 7 days.  - PT recom'd home pt  - Tylenol for pain.  - Outpatient wound care.  Dressing last changed on 7/28/23.

## 2023-07-30 NOTE — PROGRESS NOTE ADULT - PROBLEM SELECTOR PROBLEM 8
PAD (peripheral artery disease)
Prophylactic measure

## 2023-07-30 NOTE — PROGRESS NOTE ADULT - SUBJECTIVE AND OBJECTIVE BOX
PGY-1 Progress Note discussed with attending    PAGER #: [[phone]] TILL 5:00 PM  PLEASE CONTACT ON CALL TEAM:  - On Call Team (Please refer to Hannah) FROM 5:00 PM - 8:30PM  - Nightfloat Team FROM 8:30 -7:30 AM      INTERVAL HPI/OVERNIGHT EVENTS: No overnight events. Patient was sitting comfortably in bed. S/p L upper thigh cellulitis I&D, post-op day 8. Patient denies pain at site of I&D. She mentions itchiness around the dressing site, likely due to the adhesion. Dressing last changed on 7/28/23.     MEDICATIONS  (STANDING):  chlorhexidine 2% Cloths 1 Application(s) Topical <User Schedule>  enoxaparin Injectable 40 milliGRAM(s) SubCutaneous every 24 hours  levothyroxine 50 MICROGram(s) Oral daily  melatonin 5 milliGRAM(s) Oral at bedtime  melatonin 5 milliGRAM(s) Oral at bedtime  pantoprazole    Tablet 40 milliGRAM(s) Oral two times a day  polyethylene glycol 3350 17 Gram(s) Oral daily  senna 2 Tablet(s) Oral at bedtime  vancomycin  IVPB 1250 milliGRAM(s) IV Intermittent every 12 hours    MEDICATIONS  (PRN):  acetaminophen     Tablet .. 650 milliGRAM(s) Oral every 6 hours PRN Temp greater or equal to 38C (100.4F), Mild Pain (1 - 3)  benzonatate 100 milliGRAM(s) Oral every 8 hours PRN Cough  calamine/zinc oxide Lotion 1 Application(s) Topical four times a day PRN Itching  guaiFENesin Oral Liquid (Sugar-Free) 200 milliGRAM(s) Oral every 6 hours PRN Cough      REVIEW OF SYSTEMS:  CONSTITUTIONAL: No fever, weight loss, or fatigue  RESPIRATORY: No shortness of breath  CARDIOVASCULAR: No chest pain  GASTROINTESTINAL: No abdominal pain.  GENITOURINARY: No dysuria  NEUROLOGICAL: No headaches  SKIN: No itching, burning, rashes    Vital Signs Last 24 Hrs  T(C): 36.3 (30 Jul 2023 12:52), Max: 37.2 (29 Jul 2023 21:23)  T(F): 97.4 (30 Jul 2023 12:52), Max: 98.9 (29 Jul 2023 21:23)  HR: 80 (30 Jul 2023 12:52) (69 - 80)  BP: 119/70 (30 Jul 2023 12:52) (119/70 - 137/74)  BP(mean): --  RR: 18 (30 Jul 2023 12:52) (18 - 18)  SpO2: 96% (30 Jul 2023 12:52) (96% - 98%)    Parameters below as of 30 Jul 2023 12:52  Patient On (Oxygen Delivery Method): room air        PHYSICAL EXAMINATION:  GENERAL: NAD, well built  HEAD:  Atraumatic, Normocephalic  EYES:  conjunctiva and sclera clear  CHEST/LUNG: Clear to auscultation. No rales, rhonchi, wheezing, or rubs  HEART: Regular rate and rhythm; No murmurs, rubs, or gallops  ABDOMEN: Soft, Nontender, Nondistended; Bowel sounds present  NERVOUS SYSTEM:  Alert & Oriented X3,    EXTREMITIES:  2+ Peripheral Pulses, No clubbing, cyanosis, or edema  + dressing L upper thigh, clean and intact, no drainage.  SKIN: warm dry                          10.4   7.55  )-----------( 286      ( 30 Jul 2023 07:44 )             33.1     07-30    143  |  109<H>  |  19<H>  ----------------------------<  114<H>  3.9   |  26  |  0.58    Ca    8.5      30 Jul 2023 07:44  Phos  3.2     07-30  Mg     2.3     07-30                CAPILLARY BLOOD GLUCOSE      RADIOLOGY & ADDITIONAL TESTS:

## 2023-07-30 NOTE — PROGRESS NOTE ADULT - ATTENDING COMMENTS
86 y/o female from home with Pmhx of HTN, PAD with left LE stent(sep 22), endometrial ca with JENNIFER lung mets s/p resection, chemo, and radiotherapy presenting to the hospital with left thigh swelling. Admitted for cellulitis with abscess to left upper thigh, for I&D in OR on 7/22.      Patient was seen and examined, feels well. Still w/ oozing purulent discharge from the wound     labs reviewed- cbc, bmp, mg, po4 , VT     PE as above-   induration lesser now       A/P:  #Left thigh abscess s/p I&D 7/22   #MRSA infection   #HTN  #PAD s/p stent   #Endometrial ca w/ lung mets - s/p chemo/radiation   #DVT ppx     Plan:  -C/w IV vancomycin for now, dose adjusted based on VT. Will switch to PO doxy upon DC per ID recs.   -D/w surgery service, appreciate WCO-" change packing daily with sterile wet to dry Kerlix, cover with dry gauze and Medipore. Pt may shower prior to dressing changes and irrigate wound"  -C/w levothyroxine.   -Pt w/ hx of PAD s/p stents, not on any asa/plavix. Spoke w/ patient who confirmed that her last stent was placed in September'22. Will reach out to her primary vascular surgeon.   -Medically stable for DC for several days, initial plan for DC home w/ VNS and wound care. However, per daughter she won't be able to carry out daily wound care and given location (buttock), patient herself can't perform wound care. Now pending SARINA, choices given.   - Will need out-patient f/u w/ surgery service- Dr. Howard. Appointment made for 08/10/2023 at 11:45am   -Out-patient GI f/u as well   -C/w Lovenox SC . 88 y/o female from home with Pmhx of HTN, PAD with left LE stent(sep 22), endometrial ca with JENNIFER lung mets s/p resection, chemo, and radiotherapy presenting to the hospital with left thigh swelling. Admitted for cellulitis with abscess to left upper thigh, for I&D in OR on 7/22.      Patient was seen and examined, feels well.  No new complaints. She was concerned as to why a nasal PCR was performed- informed that she does not have to worry about results and is not necessarily relevant in her case. Last night, stool was dark brown not black     labs reviewed- cbc, bmp, mg, po4 , VT     PE as above-       A/P:  #Left thigh abscess s/p I&D 7/22   #MRSA infection   #HTN  #PAD s/p stent   #Intermittent melena- no active gi bleeding   #Endometrial ca w/ lung mets - s/p chemo/radiation   #Cecal mass- per hx   #DVT ppx     Plan:  -C/w IV vancomycin D-9, will reach out to ID regarding duration of abx. She may not need to be switched to po doxycycline and may complete course in hospital while awaiting SARINA placement.   -D/w surgery service, appreciate WCO-" change packing daily with sterile wet to dry Kerlix, cover with dry gauze and Medipore. Pt may shower prior to dressing changes and irrigate wound"  -C/w levothyroxine.   -Pt w/ hx of PAD s/p stents, not on any asa/plavix?, start asa at least, was not able to reach her primary vascular doctor.   - Will need out-patient f/u w/ surgery service- Dr. Howard. Appointment made for 08/10/2023 at 11:45am   -Out-patient GI f/u for colonoscopy and endoscopy- Dr Perry   -Out-patient f/u w/ onc- Dr. Rose Crowley   -C/w Epi CASTILLO .

## 2023-07-31 ENCOUNTER — TRANSCRIPTION ENCOUNTER (OUTPATIENT)
Age: 88
End: 2023-07-31

## 2023-07-31 VITALS
OXYGEN SATURATION: 97 % | DIASTOLIC BLOOD PRESSURE: 65 MMHG | SYSTOLIC BLOOD PRESSURE: 135 MMHG | HEART RATE: 82 BPM | TEMPERATURE: 98 F | RESPIRATION RATE: 18 BRPM

## 2023-07-31 LAB — VANCOMYCIN TROUGH SERPL-MCNC: 13.7 UG/ML — SIGNIFICANT CHANGE UP (ref 10–20)

## 2023-07-31 PROCEDURE — 99239 HOSP IP/OBS DSCHRG MGMT >30: CPT | Mod: GC

## 2023-07-31 RX ORDER — MUPIROCIN 20 MG/G
1 OINTMENT TOPICAL
Refills: 0 | Status: DISCONTINUED | OUTPATIENT
Start: 2023-07-31 | End: 2023-07-31

## 2023-07-31 RX ORDER — ASPIRIN/CALCIUM CARB/MAGNESIUM 324 MG
1 TABLET ORAL
Qty: 30 | Refills: 0
Start: 2023-07-31 | End: 2023-08-29

## 2023-07-31 RX ADMIN — CHLORHEXIDINE GLUCONATE 1 APPLICATION(S): 213 SOLUTION TOPICAL at 05:31

## 2023-07-31 RX ADMIN — Medication 50 MICROGRAM(S): at 05:30

## 2023-07-31 RX ADMIN — PANTOPRAZOLE SODIUM 40 MILLIGRAM(S): 20 TABLET, DELAYED RELEASE ORAL at 05:30

## 2023-07-31 RX ADMIN — Medication 81 MILLIGRAM(S): at 11:07

## 2023-07-31 RX ADMIN — Medication 166.67 MILLIGRAM(S): at 06:11

## 2023-07-31 RX ADMIN — Medication 100 MILLIGRAM(S): at 15:28

## 2023-07-31 RX ADMIN — ENOXAPARIN SODIUM 40 MILLIGRAM(S): 100 INJECTION SUBCUTANEOUS at 06:58

## 2023-07-31 NOTE — DISCHARGE NOTE NURSING/CASE MANAGEMENT/SOCIAL WORK - PATIENT PORTAL LINK FT
You can access the FollowMyHealth Patient Portal offered by [facility] by registering at the following website: [email or web address]. By joining [employer]’s FollowMyHealth portal, you will also be able to view your health information using other applications (apps) compatible with our system.

## 2023-08-09 ENCOUNTER — APPOINTMENT (OUTPATIENT)
Dept: GASTROENTEROLOGY | Facility: CLINIC | Age: 88
End: 2023-08-09

## 2023-08-17 ENCOUNTER — APPOINTMENT (OUTPATIENT)
Dept: SURGERY | Facility: CLINIC | Age: 88
End: 2023-08-17
Payer: MEDICARE

## 2023-08-17 VITALS
HEART RATE: 71 BPM | BODY MASS INDEX: 40.57 KG/M2 | DIASTOLIC BLOOD PRESSURE: 64 MMHG | HEIGHT: 62 IN | WEIGHT: 220.46 LBS | SYSTOLIC BLOOD PRESSURE: 109 MMHG

## 2023-08-17 DIAGNOSIS — Z80.9 FAMILY HISTORY OF MALIGNANT NEOPLASM, UNSPECIFIED: ICD-10-CM

## 2023-08-17 DIAGNOSIS — L02.416 CUTANEOUS ABSCESS OF LEFT LOWER LIMB: ICD-10-CM

## 2023-08-17 PROCEDURE — 99213 OFFICE O/P EST LOW 20 MIN: CPT

## 2023-08-17 NOTE — PHYSICAL EXAM
[Alert] : alert [Oriented to Person] : oriented to person [Oriented to Place] : oriented to place [Oriented to Time] : oriented to time [Calm] : calm [de-identified] : A/Ox3; NAD. appears comfortable [de-identified] : airway patent, no use of accessory muscles [de-identified] : ambulates via wheelchair  [de-identified] : wound is healing well, no evidence of acute inflammation or infection, no bleeding or pustular drainage seen

## 2023-08-17 NOTE — PLAN
[FreeTextEntry1] : patient will follow up if needed. Warning signs, follow up, and restrictions were discussed with the patient. Patient's questions and concerns addressed to their satisfaction, and patient verbalized an understanding of the information discussed.

## 2023-08-17 NOTE — ASSESSMENT
[FreeTextEntry1] : Ms. MENDOZA is a 87 year y/o F,  S/P Incision and drainage of left thigh abscess, 07/22/23.   Incision site is healing well and as expected, wound appears superficial. There is no evidence of infection/complication, and patient is progressing as expected. Post-operative wound care, activities, restrictions and precautions were reinforced.

## 2023-08-17 NOTE — REASON FOR VISIT
[Post Op: _________] : a [unfilled] post op visit [FreeTextEntry1] : S/P Incision and drainage of left thigh abscess, 07/22/23

## 2023-08-17 NOTE — HISTORY OF PRESENT ILLNESS
[de-identified] : CHAYO MENDOZA presents to the office for postoperative visit today, she is S/P Incision and drainage of left thigh abscess, 07/22/23.  Patient resides in rehab center. She denies any complaints. No drainage from the incision site.

## 2023-08-21 ENCOUNTER — EMERGENCY (EMERGENCY)
Facility: HOSPITAL | Age: 88
LOS: 1 days | Discharge: HOME | End: 2023-08-21
Attending: STUDENT IN AN ORGANIZED HEALTH CARE EDUCATION/TRAINING PROGRAM
Payer: MEDICARE

## 2023-08-21 VITALS
SYSTOLIC BLOOD PRESSURE: 159 MMHG | TEMPERATURE: 98 F | RESPIRATION RATE: 17 BRPM | HEART RATE: 62 BPM | DIASTOLIC BLOOD PRESSURE: 64 MMHG | OXYGEN SATURATION: 100 %

## 2023-08-21 VITALS
DIASTOLIC BLOOD PRESSURE: 63 MMHG | HEART RATE: 68 BPM | OXYGEN SATURATION: 94 % | WEIGHT: 218.04 LBS | HEIGHT: 62 IN | SYSTOLIC BLOOD PRESSURE: 121 MMHG | RESPIRATION RATE: 17 BRPM | TEMPERATURE: 98 F

## 2023-08-21 DIAGNOSIS — Z90.710 ACQUIRED ABSENCE OF BOTH CERVIX AND UTERUS: Chronic | ICD-10-CM

## 2023-08-21 DIAGNOSIS — Z90.2 ACQUIRED ABSENCE OF LUNG [PART OF]: Chronic | ICD-10-CM

## 2023-08-21 DIAGNOSIS — Z95.828 PRESENCE OF OTHER VASCULAR IMPLANTS AND GRAFTS: Chronic | ICD-10-CM

## 2023-08-21 PROCEDURE — 99281 EMR DPT VST MAYX REQ PHY/QHP: CPT

## 2023-08-21 PROCEDURE — 99284 EMERGENCY DEPT VISIT MOD MDM: CPT

## 2023-08-21 NOTE — ED PROVIDER NOTE - PATIENT PORTAL LINK FT
You can access the FollowMyHealth Patient Portal offered by [facility] by registering at the following website: [email or web address]. By joining [employer]’s FollowMyHealth portal, you will also be able to view your health information using other applications (apps) compatible with our system.

## 2023-08-21 NOTE — ED PROVIDER NOTE - OBJECTIVE STATEMENT
87 y.o presenting for wound check. patient was recently discharged from rehab facility 3 days ago after abscess drainage from left buttocks. endorses that she was told she would have visiting wound care when she got home but no one came. dressing last change 3 days. denies n, v, fever, cough, worsening pain or swelling

## 2023-08-21 NOTE — ED PROVIDER NOTE - CLINICAL SUMMARY MEDICAL DECISION MAKING FREE TEXT BOX
Patient wound appears well healed. no signs of active or worsening infection. wet to dry dressing changes. clinically well. patient state she wants to go home. sw contacted her rehab facility, wound care nurse to visit patient tomorrow. return precaution instructed.

## 2023-08-21 NOTE — ED ADULT NURSE NOTE - NS ED NURSE DISCH DISPOSITION
Discharged Implemented All Universal Safety Interventions:  [place] to call system. Call bell, personal items and telephone within reach. Instruct patient to call for assistance. Room bathroom lighting operational. Non-slip footwear when patient is off stretcher. Physically safe environment: no spills, clutter or unnecessary equipment. Stretcher in lowest position, wheels locked, appropriate side rails in place.

## 2023-08-21 NOTE — ED ADULT NURSE NOTE - NSFALLUNIVINTERV_ED_ALL_ED
Bed/Stretcher in lowest position, wheels locked, appropriate side rails in place/Call bell, personal items and telephone in reach/Instruct patient to call for assistance before getting out of bed/chair/stretcher/Non-slip footwear applied when patient is off stretcher/[place] to call system/Physically safe environment - no spills, clutter or unnecessary equipment/Purposeful proactive rounding/Room/bathroom lighting operational, light cord in reach

## 2023-08-21 NOTE — ED PROVIDER NOTE - NSFOLLOWUPINSTRUCTIONS_ED_ALL_ED_FT
Wound Care, Adult  Taking care of your wound properly can help to prevent pain, infection, and scarring. It can also help your wound heal more quickly. Follow instructions from your health care provider about how to care for your wound.    Supplies needed:  Soap and water.  Wound cleanser, saline, or germ-free (sterile) water.  Gauze.  If needed, a clean bandage (dressing) or other type of wound dressing material to cover or place in the wound. Follow your health care provider's instructions about what dressing supplies to use.  Cream or topical ointment to apply to the wound, if told by your health care provider.  How to care for your wound  Cleaning the wound    Ask your health care provider how to clean the wound. This may include:  Using mild soap and water, a wound cleanser, saline, or sterile water.  Using a clean gauze to pat the wound dry after cleaning it. Do not rub or scrub the wound.  Dressing care    Wash your hands with soap and water for at least 20 seconds before and after you change the dressing. If soap and water are not available, use hand .  Change your dressing as told by your health care provider. This may include:  Cleaning or rinsing out (irrigating) the wound.  Application of cream or topical ointment, if told by your health care provider.  Placing a dressing over the wound or in the wound (packing).  Covering the wound with an outer dressing.  Leave stitches (sutures), staples, skin glue, or adhesive strips in place. These skin closures may need to stay in place for 2 weeks or longer. If adhesive strip edges start to loosen and curl up, you may trim the loose edges. Do not remove adhesive strips completely unless your health care provider tells you to do that.  Ask your health care provider when you can leave the wound uncovered.  Checking for infection    Two stitched wounds. One is normal. The other is red with pus and infected.  Check your wound area every day for signs of infection. Check for:  More redness, swelling, or pain.  Fluid or blood.  Warmth.  Pus or a bad smell.  Follow these instructions at home  Medicines    If you were prescribed an antibiotic medicine, cream, or ointment, take or apply it as told by your health care provider. Do not stop using the antibiotic even if your condition improves.  If you were prescribed pain medicine, take it 30 minutes before you do any wound care or as told by your health care provider.  Take over-the-counter and prescription medicines only as told by your health care provider.  Eating and drinking    Eat a diet that includes protein, vitamin A, vitamin C, and other nutrient-rich foods to help the wound heal.  Foods rich in protein include meat, fish, eggs, dairy, beans, and nuts.  Foods rich in vitamin A include carrots and dark green, leafy vegetables.  Foods rich in vitamin C include citrus fruits, tomatoes, broccoli, and peppers.  Drink enough fluid to keep your urine pale yellow.  General instructions    Do not take baths, swim, or use a hot tub until your health care provider approves. Ask your health care provider if you may take showers. You may only be allowed to take sponge baths.  Do not scratch or pick at the wound. Keep it covered as told by your health care provider.  Return to your normal activities as told by your health care provider. Ask your health care provider what activities are safe for you.  Protect your wound from the sun when you are outside for the first 6 months, or for as long as told by your health care provider. Cover up the scar area or apply sunscreen that has an SPF of at least 30.  Do not use any products that contain nicotine or tobacco. These products include cigarettes, chewing tobacco, and vaping devices, such as e-cigarettes. If you need help quitting, ask your health care provider.  Keep all follow-up visits. This is important.  Contact a health care provider if:  You received a tetanus shot and you have swelling, severe pain, redness, or bleeding at the injection site.  Your pain is not controlled with medicine.  You have any of these signs of infection:  More redness, swelling, or pain around the wound.  Fluid or blood coming from the wound.  Warmth coming from the wound.  A fever or chills.  You are nauseous or you vomit.  You are dizzy.  You have a new rash or hardness around the wound.  Get help right away if:  You have a red streak of skin near the area around your wound.  Pus or a bad smell coming from the wound.  Your wound has been closed with staples, sutures, skin glue, or adhesive strips and it begins to open up and separate.  Your wound is bleeding, and the bleeding does not stop with gentle pressure.  These symptoms may represent a serious problem that is an emergency. Do not wait to see if the symptoms will go away. Get medical help right away. Call your local emergency services (911 in the [place] Do not drive yourself to the hospital.    Summary  Always wash your hands with soap and water for at least 20 seconds before and after changing your dressing.  Change your dressing as told by your health care provider.  To help with healing, eat foods that are rich in protein, vitamin A, vitamin C, and other nutrients.  Check your wound every day for signs of infection. Contact your health care provider if you think that your wound is infected.  This information is not intended to replace advice given to you by your health care provider. Make sure you discuss any questions you have with your health care provider. Post-surgical wound, would needs dressing change by wound care nurse every 24 hours.     Wound Care, Adult  Taking care of your wound properly can help to prevent pain, infection, and scarring. It can also help your wound heal more quickly. Follow instructions from your health care provider about how to care for your wound.    Supplies needed:  Soap and water.  Wound cleanser, saline, or germ-free (sterile) water.  Gauze.  If needed, a clean bandage (dressing) or other type of wound dressing material to cover or place in the wound. Follow your health care provider's instructions about what dressing supplies to use.  Cream or topical ointment to apply to the wound, if told by your health care provider.  How to care for your wound  Cleaning the wound    Ask your health care provider how to clean the wound. This may include:  Using mild soap and water, a wound cleanser, saline, or sterile water.  Using a clean gauze to pat the wound dry after cleaning it. Do not rub or scrub the wound.  Dressing care    Wash your hands with soap and water for at least 20 seconds before and after you change the dressing. If soap and water are not available, use hand .  Change your dressing as told by your health care provider. This may include:  Cleaning or rinsing out (irrigating) the wound.  Application of cream or topical ointment, if told by your health care provider.  Placing a dressing over the wound or in the wound (packing).  Covering the wound with an outer dressing.  Leave stitches (sutures), staples, skin glue, or adhesive strips in place. These skin closures may need to stay in place for 2 weeks or longer. If adhesive strip edges start to loosen and curl up, you may trim the loose edges. Do not remove adhesive strips completely unless your health care provider tells you to do that.  Ask your health care provider when you can leave the wound uncovered.  Checking for infection    Two stitched wounds. One is normal. The other is red with pus and infected.  Check your wound area every day for signs of infection. Check for:  More redness, swelling, or pain.  Fluid or blood.  Warmth.  Pus or a bad smell.  Follow these instructions at home  Medicines    If you were prescribed an antibiotic medicine, cream, or ointment, take or apply it as told by your health care provider. Do not stop using the antibiotic even if your condition improves.  If you were prescribed pain medicine, take it 30 minutes before you do any wound care or as told by your health care provider.  Take over-the-counter and prescription medicines only as told by your health care provider.  Eating and drinking    Eat a diet that includes protein, vitamin A, vitamin C, and other nutrient-rich foods to help the wound heal.  Foods rich in protein include meat, fish, eggs, dairy, beans, and nuts.  Foods rich in vitamin A include carrots and dark green, leafy vegetables.  Foods rich in vitamin C include citrus fruits, tomatoes, broccoli, and peppers.  Drink enough fluid to keep your urine pale yellow.  General instructions    Do not take baths, swim, or use a hot tub until your health care provider approves. Ask your health care provider if you may take showers. You may only be allowed to take sponge baths.  Do not scratch or pick at the wound. Keep it covered as told by your health care provider.  Return to your normal activities as told by your health care provider. Ask your health care provider what activities are safe for you.  Protect your wound from the sun when you are outside for the first 6 months, or for as long as told by your health care provider. Cover up the scar area or apply sunscreen that has an SPF of at least 30.  Do not use any products that contain nicotine or tobacco. These products include cigarettes, chewing tobacco, and vaping devices, such as e-cigarettes. If you need help quitting, ask your health care provider.  Keep all follow-up visits. This is important.  Contact a health care provider if:  You received a tetanus shot and you have swelling, severe pain, redness, or bleeding at the injection site.  Your pain is not controlled with medicine.  You have any of these signs of infection:  More redness, swelling, or pain around the wound.  Fluid or blood coming from the wound.  Warmth coming from the wound.  A fever or chills.  You are nauseous or you vomit.  You are dizzy.  You have a new rash or hardness around the wound.  Get help right away if:  You have a red streak of skin near the area around your wound.  Pus or a bad smell coming from the wound.  Your wound has been closed with staples, sutures, skin glue, or adhesive strips and it begins to open up and separate.  Your wound is bleeding, and the bleeding does not stop with gentle pressure.  These symptoms may represent a serious problem that is an emergency. Do not wait to see if the symptoms will go away. Get medical help right away. Call your local emergency services (911 in the [place] Do not drive yourself to the hospital.    Summary  Always wash your hands with soap and water for at least 20 seconds before and after changing your dressing.  Change your dressing as told by your health care provider.  To help with healing, eat foods that are rich in protein, vitamin A, vitamin C, and other nutrients.  Check your wound every day for signs of infection. Contact your health care provider if you think that your wound is infected.  This information is not intended to replace advice given to you by your health care provider. Make sure you discuss any questions you have with your health care provider.

## 2023-08-21 NOTE — CHART NOTE - NSCHARTNOTEFT_GEN_A_CORE
TROY consult requested by ED Attending , Dr. Sanabria regarding wound care services at home,  Pt is an 87 yr old female presenting to the ED for a wound check. Pt  Dc'd from [facility] on Friday 8/18. Pt stated that pt was told that a wound care RN would be coming to her home today in order to clean her wound and change the dressing however nobody showed up or called her. SW read and appreciated pt EMR. Pt was Dc'd from [facility] from [facility]  [phone]. TROY attempted to contact case management at [facility] however was unable to reach a . TROY contacted [facility] [phone] and spoke with Katie Webb- Director of Referral Center for [facility], as per Ms. Webb pt's insurance company called earlier in the day also requesting an update on home care services however no referral was received.  Ms. Webb stated that she would follow up with [facility] in the AM but will send a RN to pt's home tomorrow 8/22 to evaluate pt.   TROY updated ED Attending on conversation with [facility].    Pt to be DC'd back to home this evening.

## 2023-08-22 ENCOUNTER — NON-APPOINTMENT (OUTPATIENT)
Age: 88
End: 2023-08-22

## 2023-08-28 DIAGNOSIS — Z63.4 DISAPPEARANCE AND DEATH OF FAMILY MEMBER: ICD-10-CM

## 2023-08-28 SDOH — SOCIAL STABILITY - SOCIAL INSECURITY: DISSAPEARANCE AND DEATH OF FAMILY MEMBER: Z63.4

## 2023-09-14 ENCOUNTER — APPOINTMENT (OUTPATIENT)
Age: 88
End: 2023-09-14

## 2023-09-21 RX ORDER — LEVOTHYROXINE SODIUM 125 MCG
1 TABLET ORAL
Refills: 0 | DISCHARGE

## 2023-09-21 RX ORDER — FUROSEMIDE 40 MG
1 TABLET ORAL
Refills: 0 | DISCHARGE

## 2023-12-20 DIAGNOSIS — Z00.00 ENCOUNTER FOR GENERAL ADULT MEDICAL EXAMINATION W/OUT ABNORMAL FINDINGS: ICD-10-CM

## 2024-01-02 ENCOUNTER — RESULT REVIEW (OUTPATIENT)
Age: 89
End: 2024-01-02

## 2024-02-20 NOTE — ED ADULT NURSE NOTE - NSFALLRSKUNASSIST_ED_ALL_ED
Continue Regimen: minocycline 50 mg capsule \\nQuantity: 120.0 Capsule  Days Supply: 60\\nSig: Take one capsule by mouth BID
Detail Level: Detailed
Render In Strict Bullet Format?: No
Initiate Treatment: clobetasol 0.05 % scalp solution QHS\\nQuantity: 50.0 ml  Days Supply: 30\\nSig: Apply once daily to affected areas at bedtime.\\n\\nclindamycin phosphate 1 % topical solution \\nQuantity: 60.0 ml  Days Supply: 30\\nSig: Apply to affected areas twice daily.\\n\\ntretinoin 0.025 % topical cream \\nQuantity: 45.0 g  Days Supply: 30\\nSig: Apply a pea sized amount to affected area every other night x2 weeks, then increase to nightly.
no

## 2024-03-21 PROBLEM — I10 ESSENTIAL (PRIMARY) HYPERTENSION: Chronic | Status: ACTIVE | Noted: 2023-07-22

## 2024-03-21 PROBLEM — I73.9 PERIPHERAL VASCULAR DISEASE, UNSPECIFIED: Chronic | Status: ACTIVE | Noted: 2023-07-22

## 2024-04-09 ENCOUNTER — APPOINTMENT (OUTPATIENT)
Dept: CT IMAGING | Facility: CLINIC | Age: 89
End: 2024-04-09
Payer: MEDICARE

## 2024-04-09 PROCEDURE — 74177 CT ABD & PELVIS W/CONTRAST: CPT

## 2024-04-09 PROCEDURE — 71260 CT THORAX DX C+: CPT

## 2024-04-11 ENCOUNTER — LABORATORY RESULT (OUTPATIENT)
Age: 89
End: 2024-04-11

## 2024-04-11 ENCOUNTER — OUTPATIENT (OUTPATIENT)
Dept: OUTPATIENT SERVICES | Facility: HOSPITAL | Age: 89
LOS: 1 days | Discharge: HOME | End: 2024-04-11

## 2024-04-11 DIAGNOSIS — Z90.710 ACQUIRED ABSENCE OF BOTH CERVIX AND UTERUS: Chronic | ICD-10-CM

## 2024-04-11 DIAGNOSIS — Z95.828 PRESENCE OF OTHER VASCULAR IMPLANTS AND GRAFTS: Chronic | ICD-10-CM

## 2024-04-11 DIAGNOSIS — C54.1 MALIGNANT NEOPLASM OF ENDOMETRIUM: ICD-10-CM

## 2024-04-11 DIAGNOSIS — Z90.2 ACQUIRED ABSENCE OF LUNG [PART OF]: Chronic | ICD-10-CM

## 2024-04-17 ENCOUNTER — APPOINTMENT (OUTPATIENT)
Dept: HEMATOLOGY ONCOLOGY | Facility: CLINIC | Age: 89
End: 2024-04-17
Payer: MEDICARE

## 2024-04-17 DIAGNOSIS — C54.1 MALIGNANT NEOPLASM OF ENDOMETRIUM: ICD-10-CM

## 2024-04-17 PROCEDURE — 99442: CPT

## 2024-04-17 NOTE — REASON FOR VISIT
[Home] : at home, [unfilled] , at the time of the visit. [Medical Office: ([facility])___] : at the medical office located in  [Patient] : the patient [Self] : self [Follow-Up Visit] : a follow-up [FreeTextEntry2] : UTERINE CANCER

## 2024-04-17 NOTE — HISTORY OF PRESENT ILLNESS
[Disease: _____________________] : Disease: [unfilled] [AJCC Stage: ____] : AJCC Stage: [unfilled] [de-identified] : 80y.o female with h/o EMCA diagnosed in 2009, s/p surgical staging followed by Carboplatin and Taxol and vaginal brachytherapy on .\par  Patient was going for q6 month surveillance. In March,2016 patient developed cough, w/u showed single JENNIFER 4 cm mass. 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 C/o fatigue, denies any weight loss, bleed or loss of appetite. [de-identified] : Patient here for a TTM visit. She feels well, anxious regarding the scan findings. She has an appt with GI in May.

## 2024-05-01 ENCOUNTER — APPOINTMENT (OUTPATIENT)
Dept: GASTROENTEROLOGY | Facility: CLINIC | Age: 89
End: 2024-05-01
Payer: MEDICARE

## 2024-05-01 VITALS
TEMPERATURE: 97.7 F | WEIGHT: 215 LBS | BODY MASS INDEX: 39.56 KG/M2 | RESPIRATION RATE: 16 BRPM | OXYGEN SATURATION: 95 % | SYSTOLIC BLOOD PRESSURE: 152 MMHG | HEIGHT: 62 IN | DIASTOLIC BLOOD PRESSURE: 80 MMHG | HEART RATE: 84 BPM

## 2024-05-01 DIAGNOSIS — Z12.11 ENCOUNTER FOR SCREENING FOR MALIGNANT NEOPLASM OF COLON: ICD-10-CM

## 2024-05-01 DIAGNOSIS — D49.0 NEOPLASM OF UNSPECIFIED BEHAVIOR OF DIGESTIVE SYSTEM: ICD-10-CM

## 2024-05-01 PROCEDURE — 99204 OFFICE O/P NEW MOD 45 MIN: CPT

## 2024-05-01 RX ORDER — SODIUM SULFATE, POTASSIUM SULFATE AND MAGNESIUM SULFATE 1.6; 3.13; 17.5 G/177ML; G/177ML; G/177ML
17.5-3.13-1.6 SOLUTION ORAL
Qty: 1 | Refills: 0 | Status: ACTIVE | COMMUNITY
Start: 2024-05-01 | End: 1900-01-01

## 2024-05-02 NOTE — ASSESSMENT
[FreeTextEntry1] : Colonoscopy procedure ordered The risks benefits alternatives and complications of the procedure/s were explained to the patient at length. The patient was agreeable and we will proceed. Preparation for procedure discussed side effects and adverse reactions to prep.   Patient verbalized understanding of all information provided. All questions answered and reviewed.   I spent 45 minutes with the patient as well as reviewing documents prior to and after the office visit    I Dr Brunner personally performed the evaluation and management(E/M) services for this new patient. That E/M includes conducting the clinically appropriate initial history and/or exam, assessing all conditions, and establishing  the plan or care. Today, my MARIAH Megha Coronado NP. was here to observe my evaluation and management service for this patient and follow plan of care established by me going forward.

## 2024-05-02 NOTE — HISTORY OF PRESENT ILLNESS
[FreeTextEntry1] : 88 y.o female with h/o EMCA diagnosed in 2009, s/p surgical staging followed by Carboplatin and Taxol and vaginal brachytherapy  Patient was going for q6 month surveillance. In March,2016 patient developed cough, w/u showed single JENNIFER 4 cm mass. 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 Patient has not had a colonoscopy for several years per patient may have had benign colon polyps.  No family hx of colon cancer.  Bowel movements are daily and regular without difficulty or strain. Denies rectal bleeding, blood in the stool, melena, or hematemesis.

## 2024-05-07 NOTE — ED ADULT TRIAGE NOTE - WEIGHT IN KG
Goal Outcome Evaluation:  Plan of Care Reviewed With: patient        Progress: improving  Outcome Evaluation: Nutritoin follow up. Pt reports appetite is improving. Oral intake 92% of three meals. Soft texture diet. Boost Original with breakfast daily. Con to follow for plan of care.                                98.9 <<----- Click to add NO significant Past Surgical History

## 2024-05-29 ENCOUNTER — INPATIENT (INPATIENT)
Facility: HOSPITAL | Age: 89
LOS: 0 days | Discharge: HOME HEALTH | DRG: 395 | End: 2024-05-30
Attending: SURGERY | Admitting: SURGERY
Payer: MEDICARE

## 2024-05-29 VITALS
DIASTOLIC BLOOD PRESSURE: 72 MMHG | WEIGHT: 220.02 LBS | RESPIRATION RATE: 18 BRPM | SYSTOLIC BLOOD PRESSURE: 141 MMHG | HEART RATE: 89 BPM | TEMPERATURE: 99 F | OXYGEN SATURATION: 96 %

## 2024-05-29 DIAGNOSIS — Z95.828 PRESENCE OF OTHER VASCULAR IMPLANTS AND GRAFTS: Chronic | ICD-10-CM

## 2024-05-29 DIAGNOSIS — Z90.710 ACQUIRED ABSENCE OF BOTH CERVIX AND UTERUS: Chronic | ICD-10-CM

## 2024-05-29 DIAGNOSIS — Z90.2 ACQUIRED ABSENCE OF LUNG [PART OF]: Chronic | ICD-10-CM

## 2024-05-29 LAB
ALBUMIN SERPL ELPH-MCNC: 3.2 G/DL — LOW (ref 3.5–5)
ALP SERPL-CCNC: 85 U/L — SIGNIFICANT CHANGE UP (ref 40–120)
ALT FLD-CCNC: 21 U/L DA — SIGNIFICANT CHANGE UP (ref 10–60)
ANION GAP SERPL CALC-SCNC: 6 MMOL/L — SIGNIFICANT CHANGE UP (ref 5–17)
AST SERPL-CCNC: 31 U/L — SIGNIFICANT CHANGE UP (ref 10–40)
BASOPHILS # BLD AUTO: 0.03 K/UL — SIGNIFICANT CHANGE UP (ref 0–0.2)
BASOPHILS NFR BLD AUTO: 0.3 % — SIGNIFICANT CHANGE UP (ref 0–2)
BILIRUB SERPL-MCNC: 0.4 MG/DL — SIGNIFICANT CHANGE UP (ref 0.2–1.2)
BLD GP AB SCN SERPL QL: SIGNIFICANT CHANGE UP
BUN SERPL-MCNC: 23 MG/DL — HIGH (ref 7–18)
CALCIUM SERPL-MCNC: 9.2 MG/DL — SIGNIFICANT CHANGE UP (ref 8.4–10.5)
CHLORIDE SERPL-SCNC: 101 MMOL/L — SIGNIFICANT CHANGE UP (ref 96–108)
CO2 SERPL-SCNC: 29 MMOL/L — SIGNIFICANT CHANGE UP (ref 22–31)
CREAT SERPL-MCNC: 0.98 MG/DL — SIGNIFICANT CHANGE UP (ref 0.5–1.3)
EGFR: 56 ML/MIN/1.73M2 — LOW
EOSINOPHIL # BLD AUTO: 0.12 K/UL — SIGNIFICANT CHANGE UP (ref 0–0.5)
EOSINOPHIL NFR BLD AUTO: 1.1 % — SIGNIFICANT CHANGE UP (ref 0–6)
GLUCOSE SERPL-MCNC: 144 MG/DL — HIGH (ref 70–99)
HCT VFR BLD CALC: 43.3 % — SIGNIFICANT CHANGE UP (ref 34.5–45)
HGB BLD-MCNC: 14.2 G/DL — SIGNIFICANT CHANGE UP (ref 11.5–15.5)
HIV 1 & 2 AB SERPL IA.RAPID: SIGNIFICANT CHANGE UP
IMM GRANULOCYTES NFR BLD AUTO: 0.4 % — SIGNIFICANT CHANGE UP (ref 0–0.9)
INR BLD: 0.94 RATIO — SIGNIFICANT CHANGE UP (ref 0.85–1.18)
LACTATE SERPL-SCNC: 2.2 MMOL/L — HIGH (ref 0.7–2)
LIDOCAIN IGE QN: 48 U/L — SIGNIFICANT CHANGE UP (ref 13–75)
LYMPHOCYTES # BLD AUTO: 1.07 K/UL — SIGNIFICANT CHANGE UP (ref 1–3.3)
LYMPHOCYTES # BLD AUTO: 9.5 % — LOW (ref 13–44)
MAGNESIUM SERPL-MCNC: 2.3 MG/DL — SIGNIFICANT CHANGE UP (ref 1.6–2.6)
MCHC RBC-ENTMCNC: 30.7 PG — SIGNIFICANT CHANGE UP (ref 27–34)
MCHC RBC-ENTMCNC: 32.8 GM/DL — SIGNIFICANT CHANGE UP (ref 32–36)
MCV RBC AUTO: 93.5 FL — SIGNIFICANT CHANGE UP (ref 80–100)
MONOCYTES # BLD AUTO: 0.82 K/UL — SIGNIFICANT CHANGE UP (ref 0–0.9)
MONOCYTES NFR BLD AUTO: 7.3 % — SIGNIFICANT CHANGE UP (ref 2–14)
NEUTROPHILS # BLD AUTO: 9.23 K/UL — HIGH (ref 1.8–7.4)
NEUTROPHILS NFR BLD AUTO: 81.4 % — HIGH (ref 43–77)
NRBC # BLD: 0 /100 WBCS — SIGNIFICANT CHANGE UP (ref 0–0)
PHOSPHATE SERPL-MCNC: 3.8 MG/DL — SIGNIFICANT CHANGE UP (ref 2.5–4.5)
PLATELET # BLD AUTO: 268 K/UL — SIGNIFICANT CHANGE UP (ref 150–400)
POTASSIUM SERPL-MCNC: 4.2 MMOL/L — SIGNIFICANT CHANGE UP (ref 3.5–5.3)
POTASSIUM SERPL-SCNC: 4.2 MMOL/L — SIGNIFICANT CHANGE UP (ref 3.5–5.3)
PROT SERPL-MCNC: 7.6 G/DL — SIGNIFICANT CHANGE UP (ref 6–8.3)
PROTHROM AB SERPL-ACNC: 10.7 SEC — SIGNIFICANT CHANGE UP (ref 9.5–13)
RBC # BLD: 4.63 M/UL — SIGNIFICANT CHANGE UP (ref 3.8–5.2)
RBC # FLD: 12.4 % — SIGNIFICANT CHANGE UP (ref 10.3–14.5)
SODIUM SERPL-SCNC: 136 MMOL/L — SIGNIFICANT CHANGE UP (ref 135–145)
TROPONIN I, HIGH SENSITIVITY RESULT: 5.4 NG/L — SIGNIFICANT CHANGE UP
WBC # BLD: 11.31 K/UL — HIGH (ref 3.8–10.5)
WBC # FLD AUTO: 11.31 K/UL — HIGH (ref 3.8–10.5)

## 2024-05-29 PROCEDURE — 99285 EMERGENCY DEPT VISIT HI MDM: CPT

## 2024-05-29 PROCEDURE — 74177 CT ABD & PELVIS W/CONTRAST: CPT | Mod: 26,MC

## 2024-05-29 RX ORDER — DIATRIZOATE MEGLUMINE 180 MG/ML
30 INJECTION, SOLUTION INTRAVESICAL ONCE
Refills: 0 | Status: COMPLETED | OUTPATIENT
Start: 2024-05-29 | End: 2024-05-29

## 2024-05-29 RX ORDER — FENTANYL CITRATE 50 UG/ML
25 INJECTION INTRAVENOUS ONCE
Refills: 0 | Status: DISCONTINUED | OUTPATIENT
Start: 2024-05-29 | End: 2024-05-29

## 2024-05-29 RX ORDER — ONDANSETRON 8 MG/1
4 TABLET, FILM COATED ORAL ONCE
Refills: 0 | Status: COMPLETED | OUTPATIENT
Start: 2024-05-29 | End: 2024-05-29

## 2024-05-29 RX ADMIN — DIATRIZOATE MEGLUMINE 30 MILLILITER(S): 180 INJECTION, SOLUTION INTRAVESICAL at 21:01

## 2024-05-29 RX ADMIN — ONDANSETRON 4 MILLIGRAM(S): 8 TABLET, FILM COATED ORAL at 19:47

## 2024-05-29 RX ADMIN — FENTANYL CITRATE 25 MICROGRAM(S): 50 INJECTION INTRAVENOUS at 19:47

## 2024-05-29 RX ADMIN — FENTANYL CITRATE 25 MICROGRAM(S): 50 INJECTION INTRAVENOUS at 20:47

## 2024-05-29 NOTE — ED PROVIDER NOTE - NSFOLLOWUPINSTRUCTIONS_ED_ALL_ED_FT
Please follow up with your PMD or Medicine Clinic in 2-3 days.  Follow up with Surgery in 1 week regarding your hernia  Follow up with GI in 1 week regarding your known colon mass.  Return to the ER for worsening or concerning symptoms.    - - - - - - - - - - - - -  Ventral Hernia    A ventral hernia is a bulge of tissue from inside the abdomen that pushes through a weak area of the muscles that form the front wall of the abdomen. The tissues inside the abdomen are inside a sac (peritoneum). These tissues include the small intestine, large intestine, and the fatty tissue that covers the intestines (omentum). Sometimes, the bulge that forms a hernia contains intestines. Other hernias contain only fat. Ventral hernias do not go away without surgical treatment.    There are several types of ventral hernias. You may have:  A hernia at an incision site from previous abdominal surgery (incisional hernia).  A hernia just above the belly button (epigastric hernia), or at the belly button (umbilical hernia). These types of hernias can develop from heavy lifting or straining.  A hernia that comes and goes (reducible hernia). It may be visible only when you lift or strain. This type of hernia can be pushed back into the abdomen (reduced).  A hernia that traps abdominal tissue inside the hernia (incarcerated hernia). This type of hernia does not reduce.  A hernia that cuts off blood flow to the tissues inside the hernia (strangulated hernia). The tissues can start to die if this happens. This is a very painful bulge that cannot be reduced. A strangulated hernia is a medical emergency.  What are the causes?  This condition is caused by abdominal tissue putting pressure on an area of weakness in the abdominal muscles.    What increases the risk?  The following factors may make you more likely to develop this condition:  Being age 60 or older.  Being overweight or obese.  Having had previous abdominal surgery, especially if there was an infection after surgery.  Having had an injury to the abdominal wall.  Frequently lifting or pushing heavy objects.  Having had several pregnancies.  Having a buildup of fluid inside the abdomen (ascites).  Straining to have a bowel movement or to urinate.  Having frequent coughing episodes.  What are the signs or symptoms?  The only symptom of a ventral hernia may be a painless bulge in the abdomen. A reducible hernia may be visible only when you strain, cough, or lift. Other symptoms may include:  Dull pain.  A feeling of pressure.  Signs and symptoms of a strangulated hernia may include:  Increasing pain.  Nausea and vomiting.  Pain when pressing on the hernia.  The skin over the hernia turning red or purple.  Constipation.  Blood in the stool (feces).  How is this diagnosed?  This condition may be diagnosed based on:  Your symptoms.  Your medical history.  A physical exam. You may be asked to cough or strain while standing. These actions increase the pressure inside your abdomen and force the hernia through the opening in your muscles. Your health care provider may try to reduce the hernia by gently pushing the hernia back in.  Imaging studies, such as an ultrasound or CT scan.  How is this treated?  This condition is treated with surgery. If you have a strangulated hernia, surgery is done as soon as possible. If your hernia is small and not incarcerated, you may be asked to lose some weight before surgery.    Follow these instructions at home:  Follow instructions from your health care provider about eating or drinking restrictions.  If you are overweight, your health care provider may recommend that you increase your activity level and eat a healthier diet.  Do not lift anything that is heavier than 10 lb (4.5 kg), or the limit that you are told, until your health care provider says that it is safe.  Return to your normal activities as told by your health care provider. Ask your health care provider what activities are safe for you. You may need to avoid activities that increase pressure on your hernia.  Take over-the-counter and prescription medicines only as told by your health care provider.  Keep all follow-up visits. This is important.  Contact a health care provider if:  Your hernia gets larger.  Your hernia becomes painful.  Get help right away if:  Your hernia becomes increasingly painful.  You have pain along with any of the following:  Changes in skin color in the area of the hernia.  Nausea.  Vomiting.  Fever.  These symptoms may represent a serious problem that is an emergency. Do not wait to see if the symptoms will go away. Get medical help right away. Call your local emergency services (911 in the [place] Do not drive yourself to the hospital.    Summary  A ventral hernia is a bulge of tissue from inside the abdomen that pushes through a weak area of the muscles that form the front wall of the abdomen.  This condition is treated with surgery, which may be urgent depending on your hernia.  Do not lift anything that is heavier than 10 lb (4.5 kg), and follow activity instructions from your health care provider.  This information is not intended to replace advice given to you by your health care provider. Make sure you discuss any questions you have with your health care provider.  - - - - - - - - - - - - -  Colon Mass, Adult    A colon mass is an abnormal growth in the colon, which is part of the large intestine. A mass can cause a blockage (obstruction) or bleeding in the colon.    What are the causes?  This condition may be caused by:  Cancer.  Blood vessel problems.  Infection.  Certain colon or bowel diseases. These include:  Inflammatory bowel disease, such as ulcerative colitis or Crohn's disease.  Diverticulitis. This is inflammation or infection of small pouches in the bowel.  Endometriosis. This is a condition in which the lining of the uterus grows outside of the uterus.  Scar tissue (adhesions) from a past surgery on the abdomen or from benign tumors such as a lipoma, teratoma, or hemangioma.  Volvulus. This is twisting of the intestine.  What are the signs or symptoms?  Symptoms of this condition include:  Cramping, nausea, diarrhea, or vomiting.  A fever or feeling weak.  Pain in the abdomen, side, or back.  Weight loss or loss of appetite.  Constipation or changes in bowel habits.  Bleeding from the rectum.  Feeling the need to have a bowel movement after having had one recently.  In some cases, there are no symptoms of this condition.    How is this diagnosed?  This condition may be diagnosed based on tests and procedures that are done to learn more about the mass. These may include:  Blood tests.  X-rays, ultrasound, a CT scan, or an MRI.  Colonoscopy. In this procedure, a flexible tube that has oil or gel on it (is lubricated) is inserted into the opening between the buttocks (anus) and then passed into the rectum and colon to examine the areas.  Biopsy. This is removal of a tissue sample from the mass to be looked at under a microscope. A biopsy may be taken during a colonoscopy.  In some cases, what seems like a colon mass may actually be something else, such as scarring that formed after a surgery or an ulcer.    How is this treated?  Treatment for this condition depends on the cause of the mass. You and your health care provider will discuss the meaning of your test results and the recommended steps for starting treatment. If there are serious concerns, emergency surgery may be needed.    Follow these instructions at home:  What you need to do at home depends on the cause of the mass. Follow instructions from your health care provider. In general:  Take over-the-counter and prescription medicines only as told by your health care provider.  Keep all follow-up visits. This is important.  Contact a health care provider if:  Your symptoms get worse.  You have new symptoms.  You have a fever.  Medicine does not help your pain.  You feel weaker.  You bruise or bleed easily.  Get help right away if:  You vomit bright red blood or material that looks like coffee grounds.  You have blood in your stools (feces), or your stools look black and tarry.  You faint.  You feel that the mass has suddenly gotten larger.  You have severe swelling or pressure in your abdomen (bloating).  Summary  A colon mass is an abnormal growth in the colon, which is part of the large intestine. There are many possible causes of a colon mass.  Treatment depends on the cause of the mass. If there are serious concerns, emergency surgery may be needed.  Take over-the-counter and prescription medicines only as told by your health care provider.  This information is not intended to replace advice given to you by your health care provider. Make sure you discuss any questions you have with your health care provider.

## 2024-05-29 NOTE — ED PROVIDER NOTE - NS ED ROS FT
Constitutional: (-) fever (-) chills  HENT: (-) congestion (-) rhinorrhea (-) sore throat  Eyes: (-) pain (-) redness  Respiratory: (-) cough (-) shortness of breath (-) wheezing (-) stridor  Cardiovascular: (-) chest pain (-) palpitations (-) leg swelling  Gastrointestinal: (+) abdominal pain (-) blood in stool (no melena/hematochezia) (-) diarrhea (-) vomiting  Genitourinary: (-) dysuria (-) hematuria  Musculoskeletal: (-) gait problem (-) joint swelling (-) myalgias  Skin: (-) color change (-) rash  Neurological: (-) weakness (-) numbness (-) headaches  Psychiatric/Behavioral: (-) confusion

## 2024-05-29 NOTE — ED PROVIDER NOTE - BIRTH SEX
Infectious Diseases Consultation    Date:  9/5/2022    Reason for consultation:  Leg cellulitis    HPI:  Mrs leonel Heart is a 87 year old female who was admitted on 9/4/2022 with complaints of R leg pain and swelling.  The patient was just admitted last week to this facility and seen by our service then on 9/2 due to concern R knee septic joint and cellulitis.  She had been on course of IV abx and arthrocentesis done showed cell count only 1200.  She was treated for cellulitis and discharged on keflex given cell count not consistent with septic arthritis.  Upon coming to the ER, WBC was 13,700 and was afebrile.  Lactate was 3.4 and she had episode of unresponsiveness.  CXR unremarkable along with UA.  She was placed on vanco / cefepime for relapse cellulitis.      When seen today, patient appears non-toxic. She is able to communicate no pain at the knee, lower leg.  Only thing I could get her to say yes to was with palpation of the R achilles area causing discomfort.  She denies any dsypnea, cough, abdominal pain, dysuria.    ROS:  As above, patient can answer yes/no questions but cannot provide any narrative given sequelae of CVA.      Past History  Past Medical History:   Diagnosis Date   • Alzheimer's disease (CMS/[facility]) 3/22/2019   • Essential hypertension 10/3/2019   • Hyperlipidemia 9/21/2018     Past Surgical History:   Procedure Laterality Date   • Tooth extraction  05/2021   • Total vaginal hysterectomy  1985    prolapse?     Current Medications  Current Facility-Administered Medications   Medication Dose Route Frequency Provider Last Rate Last Admin   • amLODIPine (NORVASC) tablet 5 mg  5 mg Oral Daily Johny Hidalgo DO       • atorvastatin (LIPITOR) tablet 10 mg  10 mg Oral Daily Johny Hidalgo DO   10 mg at 09/04/22 2046   • cholecalciferol (VITAMIN D) tablet 25 mcg  25 mcg Oral Daily Johny Hidalgo DO       • donepezil (ARICEPT) tablet 10 mg  10 mg Oral Nightly Johny Hidalgo, DO   10 mg at 09/04/22 2047   •  memantine (NAMENDA) tablet 10 mg  10 mg Oral QHS Johny Hidalgo DO   10 mg at 09/04/22 2240   • Potassium Standard Replacement Protocol (Levels 3.5 and lower)   Does not apply See Admin Instructions Johny Hidalgo DO       • Potassium Replacement (Levels 3.6 - 4)   Does not apply See Admin Instructions Johny Hidalgo, DO       • Magnesium Standard Replacement Protocol   Does not apply See Admin Instructions Johny Hidalgo, DO       • Phosphorus Standard Replacement Protocol   Does not apply See Admin Instructions Johny Hidalgo DO       • sodium chloride (PF) 0.9 % injection 2 mL  2 mL Intracatheter 2 times per day Johny Hidalgo, DO       • enoxaparin (LOVENOX) injection 40 mg  40 mg Subcutaneous Nightly Johny Hidalgo DO   40 mg at 09/04/22 2047   • VANCOMYCIN - PHARMACIST MONITORED Misc   Does not apply See Admin Instructions Johny Hidalgo DO       • cefepime (MAXIPIME) 1,000 mg in sodium chloride 0.9 % 100 mL IVPB  1,000 mg Intravenous 3 times per day Johny Hidalgo  mL/hr at 09/05/22 0526 1,000 mg at 09/05/22 0526   • vancomycin (VANCOCIN) 500 mg in sodium chloride 0.9 % 100 mL IVPB  500 mg Intravenous Daily Johny Hidalgo DO         Allergies  ALLERGIES:  No Known Allergies  Social History  Social History     Socioeconomic History   • Marital status:      Spouse name: Not on file   • Number of children: Not on file   • Years of education: Not on file   • Highest education level: Not on file   Occupational History   • Not on file   Tobacco Use   • Smoking status: Never Smoker   • Smokeless tobacco: Never Used   Vaping Use   • Vaping Use: never used   Substance and Sexual Activity   • Alcohol use: Not Currently   • Drug use: Never   • Sexual activity: Not on file     Comment: hysterectomy 1985   Other Topics Concern   • Not on file   Social History Narrative   • Not on file     Social Determinants of Health     Financial Resource Strain: Not on file   Food Insecurity: Not on file   Transportation Needs: Not on file    Physical Activity: Not on file   Stress: Not on file   Social Connections: Not on file   Intimate Partner Violence: Not At Risk   • Social Determinants: Intimate Partner Violence Past Fear: No   • Social Determinants: Intimate Partner Violence Current Fear: No     Family History  Family History   Problem Relation Age of Onset   • Cancer Sister    • Cancer Brother        Physical Exam  Visit Vitals  BP (!) 151/67 (BP Location: LUE - Left upper extremity, Patient Position: Semi-Baires's)   Pulse 73   Temp 98.7 °F (37.1 °C) (Oral)   Resp 16   Ht 4' 11\" (1.499 m)   Wt 56.9 kg (125 lb 7.1 oz)   SpO2 100%   BMI 25.34 kg/m²     Temp:  [97.7 °F (36.5 °C)-98.7 °F (37.1 °C)] 98.7 °F (37.1 °C)  Heart Rate:  [61-78] 73  Resp:  [15-18] 16  BP: (119-160)/(58-70) 151/67  I/O last 3 completed shifts:  In: 250 [IV Piggyback:250]  Out: 0   No intake/output data recorded.    General:  A 87 year old female sitting up in bed, NAD  HEENT:  NCAT, sclera anicteric  Neck:  supple  Heart:  RRR with S1, S2  Lungs:  CTA bilaterally  Abdomen:  Soft, non-tender  Extremities:  R knee small if any effusion. No erythema, calor.  No erythema of the lower leg but ? Bogginess to the medial Achilles area.  Mild tender.  Mild erythema there and of the distal foot.  No calor  Skin:  No rash  Neuro:  Alert, able to respond to yes/no questions  Lines:  PIV ok    Labs  Recent Labs   Lab 09/05/22  0517 09/04/22  1723 09/04/22  1503   WBC 9.0 12.6* 13.7*   RBC 3.61* 3.92* 4.17   HGB 11.1* 11.7* 12.7   HCT 33.9* 36.8 39.3    385 417     Recent Labs   Lab 09/05/22  0517 09/04/22  1503 09/02/22  0546 09/01/22  0544 08/31/22  0328   SODIUM 143 144 135   < > 142   CHLORIDE 108 104 99   < > 106   CO2 26 29 27   < > 29   BUN 24* 25* 16   < > 23*   CREATININE 0.56 0.70 0.57   < > 0.60   CALCIUM 8.6 9.3 8.8   < > 8.6   ALBUMIN 2.4* 2.9*  --   --  2.1*   BILIRUBIN 0.5 0.3  --   --  0.4   ALKPT 95 130*  --   --  131*   GPT 27 40  --   --  22   AST 22 28  --    --  21   GLUCOSE 102* 115* 110*   < > 125*    < > = values in this interval not displayed.       Micro Data:   arthrocentesis - many PMNs, no organisms.  Cx ngtd    Imagin/4 CXR    IMPRESSION:       No acute cardiopulmonary findings.    RLE US    IMPRESSION: No evidence for deep venous thrombosis.     MRI knee  IMPRESSION:   1. Minimal probable blunting of the medial and lateral meniscus.  2. Diffuse mild muscle edema, subcutaneous edema and skin thickening.  Findings are nonspecific and could be related to dermatomyositis. Correlate  clinically.  3. Large knee joint effusion.    Assessment  1.  A 87 year old female with leukocytosis secondary to ankle cellulitis / ? Achilles bursitis  - recent admit with apparent monoarticular arthritis of R knee with cell count not consistent with septic joint. She was discharged on keflex for R leg cellulitis  - presents back with pain and swelling but no apparent redness of the lower leg, questionable of the foot on the R. Placed back on vanco / cefepime and now improved  - UA, CXR unremarkable.     2.  Alzheimer's dementia with expressive aphasia due to prior CVA    3.  HTN    4.  Leukocytosis - WBC normalized    Recommendations  - US of the Achilles area looking for source  - stop cefepime and change to ceftriaxone / vanco  - follow exam, may need formal MRI of ankle   - CRP    Staffed with Dr. Nichols.  Agrees with above assessment and recommendations.  Thanks for the consult!    Magan Sauer PA-C  2022      ID Staff:  Labs, XRay and vitals personally reviewed  Relevant notes reviewed  Above discussed with PA and A/P created in collaboration     CHANTELL Nichols DO     Female

## 2024-05-29 NOTE — ED PROVIDER NOTE - OBJECTIVE STATEMENT
88 female with hx of HTN, PAD, endometrial cancer s/p hysterectomy with lung met no longer on chemo/trades, & left lower abdomen ventral hernia.  Recently diagnosed with possible colon mass and pending colonoscopy.   Pt presenting to the ED reporting left lower quadrant abdominal pain with increased size of hernia for the past few days.

## 2024-05-29 NOTE — CONSULT NOTE ADULT - SUBJECTIVE AND OBJECTIVE BOX
88F PMH HTN, PAD w LE stent, endometrial cancer s/p hysterectomy with lung met s/p lobectomy, chemoradiation and recently diagnosed with cecal mass pending cancer workup. Patient presents with left sided ventral hernia she reports having for many years. Endorses sudden onset severe pain initial episode beginning 1 night prior to presentation with mild nausea, no vomiting. No fevers, mild epigastric pain. Patient is tolerating diet as of this morning, reports 2 bowel movements since onset of pain, passing flatus. Reports pain improved in ED prior to medication.    PMH: as above  PSH: hysterectomy, csection, lobectomy, appendectomy  last colonoscopy 3 years prior, precancerous lesions found per patient    Vital Signs Last 24 Hrs  T(C): 37.1 (29 May 2024 18:02), Max: 37.1 (29 May 2024 18:02)  T(F): 98.7 (29 May 2024 18:02), Max: 98.7 (29 May 2024 18:02)  HR: 89 (29 May 2024 18:02) (89 - 89)  BP: 141/72 (29 May 2024 18:02) (141/72 - 141/72)  BP(mean): --  RR: 18 (29 May 2024 18:02) (18 - 18)  SpO2: 96% (29 May 2024 18:02) (96% - 96%)    Parameters below as of 29 May 2024 18:02  Patient On (Oxygen Delivery Method): room air    Gen: NAD, sitting upright in stretcher  CVS: RRR  Pulm: appropriate respiratory effort on room air  Abd: soft, well healed midline surgical scar, nondistended with left sided ventral abdominal hernia palpable, no overlying skin changes, nonerythematous. nontender to light palpation, mildly tender to deep palpation. no rebounding or guarding present.  Ext: FROMx4                          14.2   11.31 )-----------( 268      ( 29 May 2024 19:49 )             43.3   05-29    136  |  101  |  23<H>  ----------------------------<  144<H>  4.2   |  29  |  0.98    Ca    9.2      29 May 2024 19:49  Phos  3.8     05-29  Mg     2.3     05-29    TPro  7.6  /  Alb  3.2<L>  /  TBili  0.4  /  DBili  x   /  AST  31  /  ALT  21  /  AlkPhos  85  05-29

## 2024-05-29 NOTE — ED PROVIDER NOTE - NSICDXPASTSURGICALHX_GEN_ALL_CORE_FT
PAST SURGICAL HISTORY:  C Section 1965     cyst removed from the neck 1966     H/O: hysterectomy     History of Dilatation and Curettage (ICD9 V45.89)     S/P partial lobectomy of lung     Status post insertion of iliac artery stent

## 2024-05-29 NOTE — ED PROVIDER NOTE - PHYSICAL EXAMINATION
Gen:  Awake, alert, NAD, WDWN, NCAT, non-toxic appearing.  Eyes:  PERRL, EOMI, no icterus, normal lids/lashes, normal conjunctivae.  ENT:  External inspection normal, pink/moist membranes.   CV:  S1S2, regular rate and rhythm, no murmur/gallops/rubs, no JVD, 2+ pulses b/l, no edema/cords/homans, warm/well-perfused.  Resp:  Normal respiratory rate/effort, no respiratory distress, normal voice, speaking full sentences, lungs clear to auscultation b/l, no wheezing/rales/rhonchi, no retractions, no stridor.  Abd:  Soft abdomen, obese, +LLQ tender, +hernia not reducible in LLQ, no distended/guarding/rebound, no pulsatile mass, no CVA tender.   Musculoskeletal:  N/V intact, FROM all 4 extremities, normal motor tone  Neck:  FROM neck, supple, trachea midline, no meningismus.   Skin:  Color normal for race, warm and dry, no rash.  Neuro:  Oriented x3, CN 2-12 intact (grossly), normal motor (grossly), normal sensory (grossly), GCS 15  Psych:  Attention normal. Affect normal. Behavior normal. Judgment normal.

## 2024-05-29 NOTE — ED PROVIDER NOTE - NSICDXPASTMEDICALHX_GEN_ALL_CORE_FT
PAST MEDICAL HISTORY:  Acne Cystica (ICD9 706.1)     Anxiety     CHF (Congestive Heart Failure) (ICD9 428.0)     Endometrial cancer     Hypertension     Obesity (ICD9 278.00)     DENIS (Obstructive Sleep Apnea) (ICD9 327.23)     Peripheral arterial disease     Sciatica     systemic edema

## 2024-05-29 NOTE — ED PROVIDER NOTE - CLINICAL SUMMARY MEDICAL DECISION MAKING FREE TEXT BOX
88 female with hx of HTN, PAD, endometrial cancer s/p hysterectomy with lung met no longer on chemo/trades, & left lower abdomen ventral hernia.  Recently diagnosed with possible colon mass and pending colonoscopy.   Pt presenting to the ED reporting left lower quadrant abdominal pain with increased size of hernia for the past few days.    Vitals stable.  Nontoxic appearing, n/v intact.  Airway intact, no respiratory distress, no hypoxia.  + LLQ abdominal tenderness & possible incarcerated hernia.  No CVA tenderness.      Plan to obtain:  -Labs, EKG, CT R/O strangulated hernia VS bowel obstruction, surgery consult, IV fluids, analgesia/antiemetics needed, admit    ED Course:  Lab values demonstrate no acute/emergent pathology.  My independent interpretation of the EKG: Sinus @ [], normal axis, normal intervals, normal ST/T  My independent interpretation of XR: [No consolidation/effusion/pntx]    [***]    [Patient advised regarding need for close outpatient follow up.  Patient stable for further care in outpatient setting. No indication for inpatient admission at this time. Patient advised regarding symptomatic & supportive care and symptoms to prompt ED return. Strict return precautions provided.]    [Patient requires inpatient admission for further care & stabilization. Care signed out to inpatient team.] 88 female with hx of HTN, PAD, endometrial cancer s/p hysterectomy with lung met no longer on chemo/trades, & left lower abdomen ventral hernia.  Recently diagnosed with possible colon mass and pending colonoscopy.   Pt presenting to the ED reporting left lower quadrant abdominal pain with increased size of hernia for the past few days.    Vitals stable.  Nontoxic appearing, n/v intact.  Airway intact, no respiratory distress, no hypoxia.  + LLQ abdominal tenderness & possible incarcerated hernia.  No CVA tenderness.      Plan to obtain:  -Labs, EKG, CT R/O strangulated hernia VS bowel obstruction, surgery consult, IV fluids, analgesia/antiemetics needed, admit    ED Course:  Lab values demonstrate no acute/emergent pathology.  My independent interpretation of the EKG: Sinus @ 86 w 1st degree AV, L axis, normal ST/T  My independent interpretation of XR: [No consolidation/effusion/pntx]    [***]    [Patient advised regarding need for close outpatient follow up.  Patient stable for further care in outpatient setting. No indication for inpatient admission at this time. Patient advised regarding symptomatic & supportive care and symptoms to prompt ED return. Strict return precautions provided.]    [Patient requires inpatient admission for further care & stabilization. Care signed out to inpatient team.] 88 female with hx of HTN, PAD, endometrial cancer s/p hysterectomy with lung met no longer on chemo/trades, & left lower abdomen ventral hernia.  Recently diagnosed with possible colon mass and pending colonoscopy.   Pt presenting to the ED reporting left lower quadrant abdominal pain with increased size of hernia for the past few days.    Vitals stable.  Nontoxic appearing, n/v intact.  Airway intact, no respiratory distress, no hypoxia.  + LLQ abdominal tenderness & possible incarcerated hernia.  No CVA tenderness.      Plan to obtain:  -Labs, EKG, CT R/O strangulated hernia VS bowel obstruction, surgery consult, IV fluids, analgesia/antiemetics needed, admit    ED Course:  Lab values demonstrate no acute/emergent pathology.  My independent interpretation of the EKG: Sinus @ 86 w 1st degree AV, L axis, normal ST/T  My independent interpretation of XR: [No consolidation/effusion/pntx]    [***]    [Patient advised regarding need for close outpatient follow up.  Patient stable for further care in outpatient setting. No indication for inpatient admission at this time. Patient advised regarding symptomatic & supportive care and symptoms to prompt ED return. Strict return precautions provided.]    [Patient requires inpatient admission for further care & stabilization. Care signed out to inpatient team.]      Arianne: signed out from [facility], awaiting repeat lactate and Surgerys plan 88 female with hx of HTN, PAD, endometrial cancer s/p hysterectomy with lung met no longer on chemo/trades, & left lower abdomen ventral hernia.  Recently diagnosed with possible colon mass and pending colonoscopy.   Pt presenting to the ED reporting left lower quadrant abdominal pain with increased size of hernia for the past few days.    Vitals stable.  Nontoxic appearing, n/v intact.  Airway intact, no respiratory distress, no hypoxia.  + LLQ abdominal tenderness & possible incarcerated hernia.  No CVA tenderness.      Plan to obtain:  -Labs, EKG, CT R/O strangulated hernia VS bowel obstruction, surgery consult, IV fluids, analgesia/antiemetics needed, admit    ED Course:  Lab values w mild elevated lactate  Attempted reduction during exam but unable to completely reduce. Surgery consulted and reporting that pain improved after attempted reduction; requesting CT w PO contrast as well.  My independent interpretation of the EKG: Sinus @ 86 w 1st degree AV, L axis, normal ST/T  CT showing ventral hernia but no evidence of obstruction or strangulation. Cecal mass already known to patient. Patient planned for GI evaluation in July.    Completion of Surgery consult pending at time of sign-out.    Arianne: signed out from [facility]RE[facility], awaiting repeat lactate and Surgery plan

## 2024-05-29 NOTE — CONSULT NOTE ADULT - ATTENDING COMMENTS
88 y.o. F with large LLQ hernia seen and examined in ED. Reports pain is lessened. States has passed gas in ED, no BM. States is hungry. CT scan completed, shows incarcerated large LLQ hernia, without obstruction.  Gen- NAD. Abd- Large LLQ hernia, incarcerated. + tenderness to palpation and with attempt to reduce.  Repeat lactate just drawn  Recommend admission for observation. Pt still with abdominal tenderness, had not had po challenge.

## 2024-05-29 NOTE — CONSULT NOTE ADULT - ASSESSMENT
88F with initial onset pain in chronic left sided ventral hernia    -plan for CT with PO contrast in ED, surgery will follow for results

## 2024-05-30 ENCOUNTER — TRANSCRIPTION ENCOUNTER (OUTPATIENT)
Age: 89
End: 2024-05-30

## 2024-05-30 VITALS
RESPIRATION RATE: 18 BRPM | SYSTOLIC BLOOD PRESSURE: 115 MMHG | DIASTOLIC BLOOD PRESSURE: 60 MMHG | HEART RATE: 68 BPM | OXYGEN SATURATION: 96 % | TEMPERATURE: 98 F

## 2024-05-30 DIAGNOSIS — K43.9 VENTRAL HERNIA WITHOUT OBSTRUCTION OR GANGRENE: ICD-10-CM

## 2024-05-30 LAB
ANION GAP SERPL CALC-SCNC: 2 MMOL/L — LOW (ref 5–17)
APPEARANCE UR: CLEAR — SIGNIFICANT CHANGE UP
BILIRUB UR-MCNC: NEGATIVE — SIGNIFICANT CHANGE UP
BUN SERPL-MCNC: 19 MG/DL — HIGH (ref 7–18)
CALCIUM SERPL-MCNC: 8.9 MG/DL — SIGNIFICANT CHANGE UP (ref 8.4–10.5)
CHLORIDE SERPL-SCNC: 103 MMOL/L — SIGNIFICANT CHANGE UP (ref 96–108)
CO2 SERPL-SCNC: 33 MMOL/L — HIGH (ref 22–31)
COLOR SPEC: YELLOW — SIGNIFICANT CHANGE UP
CREAT SERPL-MCNC: 0.79 MG/DL — SIGNIFICANT CHANGE UP (ref 0.5–1.3)
DIFF PNL FLD: NEGATIVE — SIGNIFICANT CHANGE UP
EGFR: 73 ML/MIN/1.73M2 — SIGNIFICANT CHANGE UP
GLUCOSE SERPL-MCNC: 113 MG/DL — HIGH (ref 70–99)
GLUCOSE UR QL: NEGATIVE MG/DL — SIGNIFICANT CHANGE UP
HCT VFR BLD CALC: 39.4 % — SIGNIFICANT CHANGE UP (ref 34.5–45)
HCV AB S/CO SERPL IA: 0.09 S/CO — SIGNIFICANT CHANGE UP (ref 0–0.99)
HCV AB SERPL-IMP: SIGNIFICANT CHANGE UP
HGB BLD-MCNC: 12.7 G/DL — SIGNIFICANT CHANGE UP (ref 11.5–15.5)
KETONES UR-MCNC: NEGATIVE MG/DL — SIGNIFICANT CHANGE UP
LACTATE SERPL-SCNC: 1.4 MMOL/L — SIGNIFICANT CHANGE UP (ref 0.7–2)
LEUKOCYTE ESTERASE UR-ACNC: NEGATIVE — SIGNIFICANT CHANGE UP
MCHC RBC-ENTMCNC: 30.5 PG — SIGNIFICANT CHANGE UP (ref 27–34)
MCHC RBC-ENTMCNC: 32.2 GM/DL — SIGNIFICANT CHANGE UP (ref 32–36)
MCV RBC AUTO: 94.5 FL — SIGNIFICANT CHANGE UP (ref 80–100)
NITRITE UR-MCNC: NEGATIVE — SIGNIFICANT CHANGE UP
NRBC # BLD: 0 /100 WBCS — SIGNIFICANT CHANGE UP (ref 0–0)
PH UR: 5 — SIGNIFICANT CHANGE UP (ref 5–8)
PLATELET # BLD AUTO: 245 K/UL — SIGNIFICANT CHANGE UP (ref 150–400)
POTASSIUM SERPL-MCNC: 3.5 MMOL/L — SIGNIFICANT CHANGE UP (ref 3.5–5.3)
POTASSIUM SERPL-SCNC: 3.5 MMOL/L — SIGNIFICANT CHANGE UP (ref 3.5–5.3)
PROT UR-MCNC: NEGATIVE MG/DL — SIGNIFICANT CHANGE UP
RBC # BLD: 4.17 M/UL — SIGNIFICANT CHANGE UP (ref 3.8–5.2)
RBC # FLD: 12.5 % — SIGNIFICANT CHANGE UP (ref 10.3–14.5)
SODIUM SERPL-SCNC: 138 MMOL/L — SIGNIFICANT CHANGE UP (ref 135–145)
SP GR SPEC: 1.04 — HIGH (ref 1–1.03)
UROBILINOGEN FLD QL: 0.2 MG/DL — SIGNIFICANT CHANGE UP (ref 0.2–1)
WBC # BLD: 7.63 K/UL — SIGNIFICANT CHANGE UP (ref 3.8–10.5)
WBC # FLD AUTO: 7.63 K/UL — SIGNIFICANT CHANGE UP (ref 3.8–10.5)

## 2024-05-30 PROCEDURE — 84484 ASSAY OF TROPONIN QUANT: CPT

## 2024-05-30 PROCEDURE — 86850 RBC ANTIBODY SCREEN: CPT

## 2024-05-30 PROCEDURE — 93005 ELECTROCARDIOGRAM TRACING: CPT

## 2024-05-30 PROCEDURE — 86901 BLOOD TYPING SEROLOGIC RH(D): CPT

## 2024-05-30 PROCEDURE — 85025 COMPLETE CBC W/AUTO DIFF WBC: CPT

## 2024-05-30 PROCEDURE — 83690 ASSAY OF LIPASE: CPT

## 2024-05-30 PROCEDURE — 99285 EMERGENCY DEPT VISIT HI MDM: CPT | Mod: 25

## 2024-05-30 PROCEDURE — 86803 HEPATITIS C AB TEST: CPT

## 2024-05-30 PROCEDURE — 81003 URINALYSIS AUTO W/O SCOPE: CPT

## 2024-05-30 PROCEDURE — 83735 ASSAY OF MAGNESIUM: CPT

## 2024-05-30 PROCEDURE — 36415 COLL VENOUS BLD VENIPUNCTURE: CPT

## 2024-05-30 PROCEDURE — 85610 PROTHROMBIN TIME: CPT

## 2024-05-30 PROCEDURE — 83605 ASSAY OF LACTIC ACID: CPT

## 2024-05-30 PROCEDURE — 80048 BASIC METABOLIC PNL TOTAL CA: CPT

## 2024-05-30 PROCEDURE — 74177 CT ABD & PELVIS W/CONTRAST: CPT | Mod: MC

## 2024-05-30 PROCEDURE — 84100 ASSAY OF PHOSPHORUS: CPT

## 2024-05-30 PROCEDURE — 99234 HOSP IP/OBS SM DT SF/LOW 45: CPT

## 2024-05-30 PROCEDURE — 85027 COMPLETE CBC AUTOMATED: CPT

## 2024-05-30 PROCEDURE — 86900 BLOOD TYPING SEROLOGIC ABO: CPT

## 2024-05-30 PROCEDURE — 80053 COMPREHEN METABOLIC PANEL: CPT

## 2024-05-30 PROCEDURE — 86703 HIV-1/HIV-2 1 RESULT ANTBDY: CPT

## 2024-05-30 RX ORDER — ACETAMINOPHEN 500 MG
650 TABLET ORAL EVERY 6 HOURS
Refills: 0 | Status: DISCONTINUED | OUTPATIENT
Start: 2024-05-30 | End: 2024-05-30

## 2024-05-30 RX ORDER — LEVOTHYROXINE SODIUM 125 MCG
50 TABLET ORAL DAILY
Refills: 0 | Status: DISCONTINUED | OUTPATIENT
Start: 2024-05-30 | End: 2024-05-30

## 2024-05-30 RX ADMIN — Medication 50 MICROGRAM(S): at 06:34

## 2024-05-30 NOTE — DISCHARGE NOTE PROVIDER - NSDCFUSCHEDAPPT_GEN_ALL_CORE_FT
[place]  GASTRO PEREA [address]  Scheduled Appointment: 07/05/2024

## 2024-05-30 NOTE — DISCHARGE NOTE PROVIDER - HOSPITAL COURSE
88F PMH HTN, PAD w LE stent, endometrial cancer s/p hysterectomy with lung met s/p lobectomy, chemoradiation and recently diagnosed with cecal mass pending cancer workup. Patient presents with left sided ventral hernia she reports having for many years. Endorses sudden onset severe pain initial episode beginning 1 night prior to presentation with mild nausea, no vomiting. No fevers, mild epigastric pain. Patient is tolerating diet as of this morning, reports 2 bowel movements since onset of pain, passing flatus. Reports pain improved in ED prior to medication. Pt underwent a CT scan that showed incarcerated large LLQ hernia, without obstruction. Pt was admitted to the hospital, pain improved, she tolerated a regular diet and was stable for discharge with appropriate outpt f/u.

## 2024-05-30 NOTE — H&P ADULT - HISTORY OF PRESENT ILLNESS
88F PMH HTN, PAD w LE stent, endometrial cancer s/p hysterectomy with lung met s/p lobectomy, chemoradiation and recently diagnosed with cecal mass pending cancer workup. Patient presents with left sided ventral hernia she reports having for many years. Endorses sudden onset severe pain initial episode beginning 1 night prior to presentation with mild nausea, no vomiting. No fevers, mild epigastric pain. Patient is tolerating diet as of this morning, reports 2 bowel movements since onset of pain, passing flatus. Reports pain improved in ED prior to medication.    PMH: as above  PSH: hysterectomy, csection, lobectomy, appendectomy  last colonoscopy 3 years prior, precancerous lesions found per patient 88F PMH HTN, PAD w LE stent, endometrial cancer s/p hysterectomy with lung met s/p lobectomy, chemoradiation and recently diagnosed with cecal mass pending cancer workup. Patient presents with left sided ventral hernia she reports having for many years. Endorses sudden onset severe pain initial episode beginning 1 night prior to presentation with mild nausea, no vomiting. No fevers, mild epigastric pain. Patient is tolerating diet as of this morning, reports 2 bowel movements since onset of pain, passing flatus. Reports pain improved in ED prior to medication.    PMH: as above  PSH: hysterectomy, csection, lobectomy, appendectomy  last colonoscopy 3 years prior, precancerous lesions found per patient  medications: synthroid, variable diuretic use, vitamins

## 2024-05-30 NOTE — DISCHARGE NOTE PROVIDER - NSDCMRMEDTOKEN_GEN_ALL_CORE_FT
anastrozole 1 mg oral tablet: 1 tab(s) orally once a day  ascorbic acid 500 mg oral tablet: 1 tab(s) orally once a day  bumetanide 2 mg oral tablet: 1 tab(s) orally once a day  cholecalciferol oral tablet: 400 unit(s) orally once a day  escitalopram 5 mg oral tablet: 3 tab(s) orally once a day  furosemide 40 mg oral tablet: 1 tab(s) orally once a day as needed for leg swelling  levothyroxine 50 mcg (0.05 mg) oral tablet: 1 tab(s) orally once a day  levothyroxine 50 mcg (0.05 mg) oral tablet: 1 tab(s) orally once a day  lidocaine 4% topical film: Apply topically to affected area once a day  Monodox 100 mg oral capsule: 1 cap(s) orally 2 times a day  Multiple Vitamins oral tablet: 1 tab(s) orally once a day  polyethylene glycol 3350 oral powder for reconstitution: 17 gram(s) orally once a day  senna leaf extract oral tablet: 1 tab(s) orally once a day (at bedtime)  TRIAMCINOLONE 0.025% CREAM: APPLY TOPICALLY TO AFFECTED AREA EVERY DAY

## 2024-05-30 NOTE — DISCHARGE NOTE PROVIDER - ATTENDING DISCHARGE PHYSICAL EXAMINATION:
Pt seen and examined.  Reports feeling better. Reports pain is resolved. Tolerated diet. Passing flatus.  Abd- obese, + LLQ chronic incarcerated hernia, softer to palpation, non tender, no guarding no rebound  Discussed with pt dietary recommendations, eat smaller portions, lower fiver diet for now, and to take a regular stool softener.  She expressed understanding. Advised to call for worsening symptoms, outpatient follow-up.

## 2024-05-30 NOTE — H&P ADULT - NSHPPHYSICALEXAM_GEN_ALL_CORE
Vital Signs Last 24 Hrs  T(C): 37.1 (29 May 2024 18:02), Max: 37.1 (29 May 2024 18:02)  T(F): 98.7 (29 May 2024 18:02), Max: 98.7 (29 May 2024 18:02)  HR: 89 (29 May 2024 18:02) (89 - 89)  BP: 141/72 (29 May 2024 18:02) (141/72 - 141/72)  BP(mean): --  RR: 18 (29 May 2024 18:02) (18 - 18)  SpO2: 96% (29 May 2024 18:02) (96% - 96%)    Parameters below as of 29 May 2024 18:02  Patient On (Oxygen Delivery Method): room air    Gen: NAD, sitting upright in stretcher  CVS: RRR  Pulm: appropriate respiratory effort on room air  Abd: soft, well healed midline surgical scar, nondistended with left sided ventral abdominal hernia palpable, no overlying skin changes, nonerythematous. nontender to light palpation, mildly tender to deep palpation. no rebounding or guarding present.  Ext: FROMx4

## 2024-05-30 NOTE — PATIENT PROFILE ADULT - FUNCTIONAL ASSESSMENT - DAILY ACTIVITY SCORE.
Assessment/Plan:  65-year-old female status post left temporal artery biopsy on 01/29  - patient continues to have headaches  - temporal artery biopsy was negative for inflammation  - left facial incision is healing well without erythema induration or drainage  - follow-up in office as needed    Pathology Results:  Final Diagnosis   A  Artery, Left Temporal, Biopsy:  - Benign artery with no specific pathologic change, negative for inflammation  I reviewed the pathology report and discussed the findings with the patient and their accompanying family or caregiver, if present  All questions were answered to satisfaction and the patient along with family or caregiver, if present, voiced understanding  1  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               Subjective: temporal biopsy     Patient ID: Chidi Clark is a 64 y o  female  Triage Notes:     Patient is a 65-year-old female who presents to office for evaluation status post left temporal artery biopsy  Patient states she continues to have headaches but she is being worked up by Neurology and Rheumatology  She states she has had no issues with her left-sided facial incision  The following portions of the patient's history were reviewed and updated as appropriate: allergies, current medications, past family history, past medical history, past social history, past surgical history and problem list     Review of Systems   Constitutional: Negative for chills, fatigue and fever  HENT: Positive for facial swelling  Negative for congestion, hearing loss, rhinorrhea and sore throat  Eyes: Negative for pain and discharge  Respiratory: Negative for cough, chest tightness and shortness of breath  Cardiovascular: Negative for chest pain and palpitations  Gastrointestinal: Negative for abdominal pain, constipation, diarrhea, nausea and vomiting     Endocrine: Negative for cold intolerance and heat intolerance  Genitourinary: Negative for difficulty urinating and dysuria  Musculoskeletal: Negative for back pain and neck pain  Skin: Negative for color change and rash  Allergic/Immunologic: Negative for environmental allergies and food allergies  Neurological: Positive for headaches  Negative for seizures  Hematological: Negative for adenopathy  Does not bruise/bleed easily  Psychiatric/Behavioral: Negative for confusion and hallucinations  Objective:      /72   Pulse (!) 107   Temp (!) 97 4 °F (36 3 °C) (Temporal)   Ht 5' 1" (1 549 m)   Wt 79 2 kg (174 lb 9 6 oz)   Breastfeeding No   BMI 32 99 kg/m²     Below is the patient's most recent value for Albumin, ALT, AST, BUN, Calcium, Chloride, Cholesterol, CO2, Creatinine, GFR, Glucose, HDL, Hematocrit, Hemoglobin, Hemoglobin A1C, LDL, Magnesium, Phosphorus, Platelets, Potassium, PSA, Sodium, Triglycerides, and WBC  Lab Results   Component Value Date    ALT 70 01/27/2021    AST 25 01/27/2021    BUN 8 01/27/2021    CALCIUM 9 9 01/27/2021     01/27/2021    CO2 25 01/27/2021    CREATININE 0 89 01/27/2021    HDL 32 (L) 06/13/2019    HCT 43 4 01/27/2021    HGB 13 8 01/27/2021    HGBA1C 5 9 06/13/2019     01/27/2021    K 4 0 01/27/2021     10/30/2014    TRIG 219 (H) 06/13/2019    WBC 10 51 (H) 01/27/2021     Note: for a comprehensive list of the patient's lab results, access the Results Review activity  Physical Exam  Constitutional:       Appearance: Normal appearance  HENT:      Head: Normocephalic and atraumatic  Nose: Nose normal    Eyes:      General: No scleral icterus  Conjunctiva/sclera: Conjunctivae normal    Neck:      Musculoskeletal: Neck supple  Cardiovascular:      Rate and Rhythm: Normal rate and regular rhythm  Pulses: Normal pulses  Heart sounds: Normal heart sounds  Pulmonary:      Effort: Pulmonary effort is normal       Breath sounds: Normal breath sounds  Abdominal:      General: There is no distension  Palpations: Abdomen is soft  Tenderness: There is no abdominal tenderness  Musculoskeletal:         General: No signs of injury  Skin:     General: Skin is warm  Coloration: Skin is not jaundiced  Comments: Left-sided facial incision healing well without erythema induration or drainage   Neurological:      General: No focal deficit present  Mental Status: She is alert and oriented to person, place, and time     Psychiatric:         Mood and Affect: Mood normal          Behavior: Behavior normal  20

## 2024-05-30 NOTE — H&P ADULT - NSHPLABSRESULTS_GEN_ALL_CORE
14.2   11.31 )-----------( 268      ( 29 May 2024 19:49 )             43.3   05-29    136  |  101  |  23<H>  ----------------------------<  144<H>  4.2   |  29  |  0.98    Ca    9.2      29 May 2024 19:49  Phos  3.8     05-29  Mg     2.3     05-29    TPro  7.6  /  Alb  3.2<L>  /  TBili  0.4  /  DBili  x   /  AST  31  /  ALT  21  /  AlkPhos  85  05-29    5 cm cecal mass at the level of the ileocecal valve suspicious for   neoplasm.  *  No evidence of small bowel obstruction or active bowel inflammation.  *  Left lower quadrant ventral hernia containing nonobstructed small   bowel.

## 2024-05-30 NOTE — PROGRESS NOTE ADULT - ASSESSMENT
88F history of colon mass of unknown etiology with initial onset pain in chronic left sided ventral hernia. Patient is clinically non obstructed with CT scan demonstrating non obstructed bowel containing ventral hernia  Tolerating diet, return of bowel function  vitals stable  pending AM labs    -pain control as needed and monitoring for recurrence  -vitals q4  -ambulate as tolerated  -discharge planning in afternoon if tolerating lunch  -discussed with Dr. Chang

## 2024-05-30 NOTE — H&P ADULT - ASSESSMENT
88F history of colon mass of unknown etiology with initial onset pain in chronic left sided ventral hernia. Patient is clinically non obstructed with CT scan demonstrating non obstructed bowel containing ventral hernia    -admit to surgery service for monitoring and PO challenge  -no acute surgical intervention at this time  -pain control as needed and monitoring for recurrence  -vitals q4  -ambulate as tolerated  -monitor bowel function

## 2024-05-30 NOTE — DISCHARGE NOTE PROVIDER - CARE PROVIDER_API CALL
Damaso Changssica  Surgery  [address], [address]  [place] [address]  Phone: [phone]  Fax: [phone]  Follow Up Time:

## 2024-05-30 NOTE — DISCHARGE NOTE PROVIDER - CARE PROVIDERS DIRECT ADDRESSES
[email or web address]

## 2024-05-30 NOTE — PATIENT PROFILE ADULT - FALL HARM RISK - HARM RISK INTERVENTIONS

## 2024-05-30 NOTE — ED ADULT NURSE NOTE - NSFALLUNIVINTERV_ED_ALL_ED
Bed/Stretcher in lowest position, wheels locked, appropriate side rails in place/Call bell, personal items and telephone in reach/Instruct patient to call for assistance before getting out of bed/chair/stretcher/Non-slip footwear applied when patient is off stretcher/[place] to call system/Physically safe environment - no spills, clutter or unnecessary equipment/Purposeful proactive rounding/Room/bathroom lighting operational, light cord in reach

## 2024-05-30 NOTE — PROGRESS NOTE ADULT - SUBJECTIVE AND OBJECTIVE BOX
INTERVAL HPI/OVERNIGHT EVENTS:  Pt resting comfortably. Abdominal pain is better.  Diet, Regular (05-30-24 @ 01:56) [Active]    MEDICATIONS  (STANDING):  levothyroxine 50 MICROGram(s) Oral daily    MEDICATIONS  (PRN):  acetaminophen     Tablet .. 650 milliGRAM(s) Oral every 6 hours PRN Temp greater or equal to 38C (100.4F), Mild Pain (1 - 3)    Vital Signs Last 24 Hrs  T(C): 36.3 (30 May 2024 05:10), Max: 37.1 (29 May 2024 18:02)  T(F): 97.4 (30 May 2024 05:10), Max: 98.7 (29 May 2024 18:02)  HR: 59 (30 May 2024 05:10) (59 - 89)  BP: 122/62 (30 May 2024 05:10) (98/62 - 141/72)  RR: 20 (30 May 2024 05:10) (18 - 20)  SpO2: 98% (30 May 2024 05:10) (96% - 98%)    Parameters below as of 30 May 2024 05:10  Patient On (Oxygen Delivery Method): room air      Physical:  General: A&Ox3. NAD.  Abdomen: Soft nondistended, nontender; chronic incarcerated left abdominal ventral hernia, nontender upon palpation    LABS:                        14.2   11.31 )-----------( 268      ( 29 May 2024 19:49 )             43.3             05-29    136  |  101  |  23<H>  ----------------------------<  144<H>  4.2   |  29  |  0.98    Ca    9.2      29 May 2024 19:49  Phos  3.8     05-29  Mg     2.3     05-29    TPro  7.6  /  Alb  3.2<L>  /  TBili  0.4  /  DBili  x   /  AST  31  /  ALT  21  /  AlkPhos  85  05-29

## 2024-05-30 NOTE — ED ADULT NURSE NOTE - OBJECTIVE STATEMENT
Pt present to the Ed with c/ LLQ abdominal pian , Denies nausea ,emesis, No acute distress noted at this time . Breathing spontaneously on RA.

## 2024-05-30 NOTE — DISCHARGE NOTE NURSING/CASE MANAGEMENT/SOCIAL WORK - PATIENT PORTAL LINK FT
You can access the FollowMyHealth Patient Portal offered by [facility] by registering at the following website: [email or web address]. By joining [facility]’s FollowMyHealth portal, you will also be able to view your health information using other applications (apps) compatible with our system.

## 2024-05-30 NOTE — DISCHARGE NOTE PROVIDER - NSDCCPCAREPLAN_GEN_ALL_CORE_FT
PRINCIPAL DISCHARGE DIAGNOSIS  Diagnosis: Ventral hernia  Assessment and Plan of Treatment: Please return to the ED for any worsening abd pain, nausea, vomiting or fever/chills.      SECONDARY DISCHARGE DIAGNOSES  Diagnosis: Mass of cecum  Assessment and Plan of Treatment: Please f/u with your doctor as scheduled.

## 2024-06-25 ENCOUNTER — APPOINTMENT (OUTPATIENT)
Dept: GASTROENTEROLOGY | Facility: CLINIC | Age: 89
End: 2024-06-25
Payer: MEDICARE

## 2024-06-25 VITALS
TEMPERATURE: 97.3 F | SYSTOLIC BLOOD PRESSURE: 132 MMHG | HEART RATE: 92 BPM | BODY MASS INDEX: 39.56 KG/M2 | WEIGHT: 215 LBS | OXYGEN SATURATION: 98 % | HEIGHT: 62 IN | DIASTOLIC BLOOD PRESSURE: 71 MMHG

## 2024-06-25 PROCEDURE — 99213 OFFICE O/P EST LOW 20 MIN: CPT

## 2024-06-25 RX ORDER — SODIUM SULFATE, MAGNESIUM SULFATE, AND POTASSIUM CHLORIDE 17.75; 2.7; 2.25 G/1; G/1; G/1
1479-225-188 TABLET ORAL
Qty: 24 | Refills: 0 | Status: ACTIVE | COMMUNITY
Start: 2024-06-25 | End: 1900-01-01

## 2024-07-06 ENCOUNTER — NON-APPOINTMENT (OUTPATIENT)
Age: 89
End: 2024-07-06

## 2024-07-17 ENCOUNTER — APPOINTMENT (OUTPATIENT)
Dept: GASTROENTEROLOGY | Facility: HOSPITAL | Age: 89
End: 2024-07-17

## 2024-07-17 ENCOUNTER — OUTPATIENT (OUTPATIENT)
Dept: OUTPATIENT SERVICES | Facility: HOSPITAL | Age: 89
LOS: 1 days | End: 2024-07-17
Payer: MEDICARE

## 2024-07-17 ENCOUNTER — RESULT REVIEW (OUTPATIENT)
Age: 89
End: 2024-07-17

## 2024-07-17 ENCOUNTER — TRANSCRIPTION ENCOUNTER (OUTPATIENT)
Age: 89
End: 2024-07-17

## 2024-07-17 VITALS
SYSTOLIC BLOOD PRESSURE: 127 MMHG | WEIGHT: 214.95 LBS | TEMPERATURE: 98 F | OXYGEN SATURATION: 96 % | RESPIRATION RATE: 15 BRPM | DIASTOLIC BLOOD PRESSURE: 65 MMHG | HEART RATE: 81 BPM | HEIGHT: 62 IN

## 2024-07-17 VITALS
SYSTOLIC BLOOD PRESSURE: 125 MMHG | OXYGEN SATURATION: 99 % | DIASTOLIC BLOOD PRESSURE: 79 MMHG | HEART RATE: 70 BPM | RESPIRATION RATE: 18 BRPM

## 2024-07-17 DIAGNOSIS — Z90.710 ACQUIRED ABSENCE OF BOTH CERVIX AND UTERUS: Chronic | ICD-10-CM

## 2024-07-17 DIAGNOSIS — Z90.2 ACQUIRED ABSENCE OF LUNG [PART OF]: Chronic | ICD-10-CM

## 2024-07-17 DIAGNOSIS — Z12.11 ENCOUNTER FOR SCREENING FOR MALIGNANT NEOPLASM OF COLON: ICD-10-CM

## 2024-07-17 DIAGNOSIS — Z95.828 PRESENCE OF OTHER VASCULAR IMPLANTS AND GRAFTS: Chronic | ICD-10-CM

## 2024-07-17 PROCEDURE — 45385 COLONOSCOPY W/LESION REMOVAL: CPT

## 2024-07-17 PROCEDURE — 88305 TISSUE EXAM BY PATHOLOGIST: CPT

## 2024-07-17 PROCEDURE — 45380 COLONOSCOPY AND BIOPSY: CPT | Mod: XS

## 2024-07-17 PROCEDURE — 88305 TISSUE EXAM BY PATHOLOGIST: CPT | Mod: 26

## 2024-07-17 PROCEDURE — 45380 COLONOSCOPY AND BIOPSY: CPT

## 2024-07-17 RX ORDER — SODIUM CHLORIDE 0.9 % (FLUSH) 0.9 %
500 SYRINGE (ML) INJECTION
Refills: 0 | Status: COMPLETED | OUTPATIENT
Start: 2024-07-17 | End: 2024-07-17

## 2024-07-17 RX ADMIN — Medication 75 MILLILITER(S): at 11:30

## 2024-07-18 ENCOUNTER — APPOINTMENT (OUTPATIENT)
Dept: GASTROENTEROLOGY | Facility: CLINIC | Age: 89
End: 2024-07-18

## 2024-07-19 ENCOUNTER — APPOINTMENT (OUTPATIENT)
Dept: GASTROENTEROLOGY | Facility: HOSPITAL | Age: 89
End: 2024-07-19

## 2024-07-19 LAB — SURGICAL PATHOLOGY STUDY: SIGNIFICANT CHANGE UP

## 2024-07-26 ENCOUNTER — APPOINTMENT (OUTPATIENT)
Dept: GASTROENTEROLOGY | Facility: CLINIC | Age: 89
End: 2024-07-26
Payer: MEDICARE

## 2024-07-26 DIAGNOSIS — Z12.11 ENCOUNTER FOR SCREENING FOR MALIGNANT NEOPLASM OF COLON: ICD-10-CM

## 2024-07-26 PROCEDURE — 99443: CPT

## 2024-07-26 RX ORDER — POLYETHYLENE GLYCOL 3350 17 G/17G
17 POWDER, FOR SOLUTION ORAL
Qty: 1 | Refills: 0 | Status: ACTIVE | COMMUNITY
Start: 2024-07-26 | End: 1900-01-01

## 2024-07-26 NOTE — HISTORY OF PRESENT ILLNESS
[FreeTextEntry1] : Visit type: Telephonic (phone only) Patient Location: [place], [place] Provider Location: [address] [place] [id number] Consent obtained for visit:Yes Visit length: 30 minutes Others present: No  88 F large cecal lesion seen on CT 4/24 colon last week with a  large lesion arising from the ICV< TA only on path (multiple bx), 1.3 cm inflammatory cloacogenic polyp in cecum, also 2 other TAs in the ascending and at the hepatic flexure, telephonic to discuss options.  Went over colon report in detail--poor prep, could not see L side well but procedure was completed (pt thought I stopped in L colon, reassured her this is not the case).  Explained prep in more detail---more liquids, less fiber 2-3 d before next colon.  Explained office to call and schedule.

## 2024-07-26 NOTE — PLAN
[TextEntry] : Colonoscopy with Dr York. Indication: ICV TA  The procedure(s) was (were) discussed in detail with the patient, including indications, alternatives and limitations.  The risks and benefits were reviewed.  If applicable, the prep directions were reviewed as well, else the patient was told to fast on the day of the procedure.

## 2024-07-26 NOTE — ASSESSMENT
[FreeTextEntry1] : 88-year-old lady history of COPD, DJD, endometrial cancer, hypothyroidism, lung cancer (lobectomy 8 y ago, XRT JENNIFER, primary was uterine), CAD status post stent () mitral valve insufficiency, surgical history significant for D&C, , thoracotomy, JERE/BSO for endometrial cancer  (DIANA [place], chemo/XRT), here for repeat colonoscopy to remove ICV TA.

## 2024-09-13 NOTE — ED ADULT TRIAGE NOTE - CHIEF COMPLAINT QUOTE
LEFT ABSCESS D/C FRIDAY WOUND HAS'T BEEN CHANGED SINCE
Normal vision: sees adequately in most situations; can see medication labels, newsprint

## 2024-10-03 RX ORDER — POLYETHYLENE GLYCOL 3350 17 G/17G
17 POWDER, FOR SOLUTION ORAL
Qty: 1 | Refills: 0 | Status: ACTIVE | COMMUNITY
Start: 2024-10-03 | End: 1900-01-01

## 2024-10-08 ENCOUNTER — RESULT REVIEW (OUTPATIENT)
Age: 89
End: 2024-10-08

## 2024-10-08 ENCOUNTER — APPOINTMENT (OUTPATIENT)
Dept: GASTROENTEROLOGY | Facility: HOSPITAL | Age: 89
End: 2024-10-08

## 2024-10-08 ENCOUNTER — TRANSCRIPTION ENCOUNTER (OUTPATIENT)
Age: 89
End: 2024-10-08

## 2024-10-08 ENCOUNTER — INPATIENT (INPATIENT)
Facility: HOSPITAL | Age: 89
LOS: 0 days | Discharge: HOME | DRG: 394 | End: 2024-10-09
Attending: STUDENT IN AN ORGANIZED HEALTH CARE EDUCATION/TRAINING PROGRAM | Admitting: STUDENT IN AN ORGANIZED HEALTH CARE EDUCATION/TRAINING PROGRAM
Payer: MEDICARE

## 2024-10-08 VITALS
WEIGHT: 220.02 LBS | OXYGEN SATURATION: 97 % | HEIGHT: 62 IN | DIASTOLIC BLOOD PRESSURE: 66 MMHG | SYSTOLIC BLOOD PRESSURE: 125 MMHG | TEMPERATURE: 98 F | HEART RATE: 72 BPM | RESPIRATION RATE: 16 BRPM

## 2024-10-08 DIAGNOSIS — E66.01 MORBID (SEVERE) OBESITY DUE TO EXCESS CALORIES: ICD-10-CM

## 2024-10-08 DIAGNOSIS — Z90.710 ACQUIRED ABSENCE OF BOTH CERVIX AND UTERUS: Chronic | ICD-10-CM

## 2024-10-08 DIAGNOSIS — Z95.828 PRESENCE OF OTHER VASCULAR IMPLANTS AND GRAFTS: Chronic | ICD-10-CM

## 2024-10-08 DIAGNOSIS — E03.9 HYPOTHYROIDISM, UNSPECIFIED: ICD-10-CM

## 2024-10-08 DIAGNOSIS — Z86.69 PERSONAL HISTORY OF OTHER DISEASES OF THE NERVOUS SYSTEM AND SENSE ORGANS: ICD-10-CM

## 2024-10-08 DIAGNOSIS — R93.3 ABNORMAL FINDINGS ON DIAGNOSTIC IMAGING OF OTHER PARTS OF DIGESTIVE TRACT: ICD-10-CM

## 2024-10-08 DIAGNOSIS — Z12.11 ENCOUNTER FOR SCREENING FOR MALIGNANT NEOPLASM OF COLON: ICD-10-CM

## 2024-10-08 DIAGNOSIS — I10 ESSENTIAL (PRIMARY) HYPERTENSION: ICD-10-CM

## 2024-10-08 DIAGNOSIS — Z29.9 ENCOUNTER FOR PROPHYLACTIC MEASURES, UNSPECIFIED: ICD-10-CM

## 2024-10-08 DIAGNOSIS — Z90.2 ACQUIRED ABSENCE OF LUNG [PART OF]: Chronic | ICD-10-CM

## 2024-10-08 DIAGNOSIS — Z75.8 OTHER PROBLEMS RELATED TO MEDICAL FACILITIES AND OTHER HEALTH CARE: ICD-10-CM

## 2024-10-08 PROCEDURE — 99223 1ST HOSP IP/OBS HIGH 75: CPT | Mod: GC

## 2024-10-08 PROCEDURE — 88305 TISSUE EXAM BY PATHOLOGIST: CPT | Mod: 26

## 2024-10-08 RX ORDER — MAG HYDROX/ALUMINUM HYD/SIMETH 200-200-20
30 SUSPENSION, ORAL (FINAL DOSE FORM) ORAL EVERY 4 HOURS
Refills: 0 | Status: DISCONTINUED | OUTPATIENT
Start: 2024-10-08 | End: 2024-10-09

## 2024-10-08 RX ORDER — INFLUENZA VIRUS VACCINE 15; 15; 15; 15 UG/.5ML; UG/.5ML; UG/.5ML; UG/.5ML
0.5 SUSPENSION INTRAMUSCULAR ONCE
Refills: 0 | Status: DISCONTINUED | OUTPATIENT
Start: 2024-10-08 | End: 2024-10-09

## 2024-10-08 RX ORDER — 5-HYDROXYTRYPTOPHAN (5-HTP) 100 MG
3 TABLET,DISINTEGRATING ORAL AT BEDTIME
Refills: 0 | Status: DISCONTINUED | OUTPATIENT
Start: 2024-10-08 | End: 2024-10-09

## 2024-10-08 RX ORDER — ONDANSETRON HCL/PF 4 MG/2 ML
4 VIAL (ML) INJECTION EVERY 8 HOURS
Refills: 0 | Status: DISCONTINUED | OUTPATIENT
Start: 2024-10-08 | End: 2024-10-09

## 2024-10-08 RX ORDER — ACETAMINOPHEN 325 MG
650 TABLET ORAL EVERY 6 HOURS
Refills: 0 | Status: DISCONTINUED | OUTPATIENT
Start: 2024-10-08 | End: 2024-10-09

## 2024-10-08 NOTE — PATIENT PROFILE ADULT - FALL HARM RISK - HARM RISK INTERVENTIONS

## 2024-10-08 NOTE — H&P ADULT - ASSESSMENT
Patient is a 89/ F  PMH HTN, PAD w LE stent, endometrial cancer s/p hysterectomy with lung met s/p lobectomy, chemoradiation, Hx of cecal mass s/p colonoscopy ( 10/8)  showing -Large, hemicircumferential, malignant-appearing ileocecal mass unable to resect endoscopically, was to be discharged home, but unable to go home after anesthesia as her HHA left for the day.    Patient seen bedside, no complaints . Denies SOB , chest pain , is AO x 3 currently, states she is hungry and anxious regarding the next steps of colonoscopy findings     VSS - /90, RR 16, T afebrile, SpO2 100 % RA , HR 72     PRIMARY TEAM KINDLY CONFIRM MEDS IN A.M.   Patient does not remember, and we are unable to reach pharmacy. Current list is entered from [employer].

## 2024-10-08 NOTE — H&P ADULT - TIME BILLING
Time spent includes direct patient care  (interview and examination of patient), discussion with other providers, support staff and/or patient's family members, review of medical records, ordering diagnostic tests and analyzing results, and documentation. Time spent includes direct patient care  (interview and examination of patient), discussion with other providers, support staff and/or patient's family members, review of medical records, ordering diagnostic tests and analyzing results, and documentation

## 2024-10-08 NOTE — H&P ADULT - PROBLEM SELECTOR PLAN 7
patient has HHA 5  days a week fro 5 hours/ day   HHA left, was unsafe d/s due to anesthesia   - states HHA will be available in AM

## 2024-10-08 NOTE — H&P ADULT - ATTENDING COMMENTS
89 yo F with PMHx of Colon mass (just found on scope), hypothyroidism, Anxiety, CHF, Endometrial Ca, HTN, Obesity, DENIS, PAD, Sciatica who went in for outpatient GI procedure tolerated well no issues or complaints afterwards. Was given general anesthesia and home aide left pt therefore now admitted for unsafe dispo home.      Patient reports plan was always to have HHA only until a certain time due to her hours. She says she needs assistance when walking with walker and stepping assistance when entering vehicles. Reports her daughter could come pick her up tomorrow but she would like either  to set up transport if possible or she will also try on her end to call her usual Transportation services.      Spoke in depth about the finding of 6cm mass fast growing biopsies taken and will have to wait for pathology report, outpt follow up with GI and Surgery. We can also have surgery see her while inpt but she said she would not like to wait around to see them if possible to see early then she’ll wait.      No labs or imaging needed.  ROS negative no N/V/D, no f/c or pain.     Exam:   No distress   RRR   CTABL   Abdo NTND   Ext wwp      Plan:  #S/p GI outpatient scope found to have colon mass    #CHF, HTN, PAD, DENIS   #Anxiety      -Will need to speak with family/HHA to help with dispo tomorrow likely    -Per GI found to have colon mass on scope they rec if surgery can see her while inpatient though likely would not do anything while admitted this time   -Msg'ed Gen Surg MARYELLEN Brown for eval tmrw AM and possible help with outpt set up for colon mass   -f/u on pathology reports outpt, GI appointment outpt    -Can c/w home meds: Synthroid 50mcg    -No need for labs for now    -tylenol prn for pain and fevers  -regular diet   -Dvt ppx

## 2024-10-08 NOTE — H&P ADULT - HISTORY OF PRESENT ILLNESS
Patient is a 89/ F  PMH HTN, PAD w LE stent, endometrial cancer s/p hysterectomy with lung met s/p lobectomy, chemoradiation, Hx of cecal mass s/p colonoscopy ( 10/8)  showing -Large, hemicircumferential, malignant-appearing ileocecal mass unable to resect endoscopically, was to be discharged home, but unable to go home after anesthesia as her HHA left for the day.    Patient seen bedside, no complaints . Denies SOB , chest pain , is AO x 3 currently, states she is hungry and anxious regarding the next steps of colonoscopy findings     VSS - /90, RR 16, T afebrile, SpO2 100 % RA , HR 72     Of note TTE 2023 EF 55-60%v , G1DD

## 2024-10-08 NOTE — H&P ADULT - PROBLEM SELECTOR PLAN 1
Hx of cecal mass s/p colonoscopy ( 10/8)  showing -Large, hemicircumferential, malignant-appearing ileocecal mass unable to resect endoscopically,    Recommend surgery consult   [ ] consider .Sx consult in AM vs OP follow up

## 2024-10-08 NOTE — H&P ADULT - NSHPPHYSICALEXAM_GEN_ALL_CORE
PHYSICAL EXAM:  GENERAL: NAD, speaks in full sentences, no signs of respiratory distress  HEAD:  Atraumatic, Normocephalic  EYES: EOMI, PERRLA, conjunctiva and sclera clear, wearing sunglasses due to chronic  photosensitivity   NECK: Supple, No JVD  CHEST/LUNG: Clear to auscultation bilaterally; No wheeze; No crackles; No accessory muscles used  HEART: Regular rate and rhythm; No murmurs;   ABDOMEN: Soft, Nontender, Nondistended; Bowel sounds present; No guarding  EXTREMITIES:  2+ Peripheral Pulses, No cyanosis , + LE non pitting edema   PSYCH: AAOx3  NEUROLOGY: non-focal  SKIN: No rashes or lesions

## 2024-10-08 NOTE — H&P ADULT - PROBLEM SELECTOR PLAN 4
h/o HTN , does not remember meds   States he takes lasix for leg swelling   TTE 2023 EF 55-60 % , ? cardiomyopathy , G1 DD   Monitor BP  consider resuming lasix in AM   DASH diet     - obtain medrec

## 2024-10-09 ENCOUNTER — TRANSCRIPTION ENCOUNTER (OUTPATIENT)
Age: 89
End: 2024-10-09

## 2024-10-09 VITALS
HEART RATE: 65 BPM | OXYGEN SATURATION: 98 % | DIASTOLIC BLOOD PRESSURE: 60 MMHG | SYSTOLIC BLOOD PRESSURE: 118 MMHG | RESPIRATION RATE: 18 BRPM | TEMPERATURE: 98 F

## 2024-10-09 LAB
ALBUMIN SERPL ELPH-MCNC: 2.7 G/DL — LOW (ref 3.5–5)
ALP SERPL-CCNC: 68 U/L — SIGNIFICANT CHANGE UP (ref 40–120)
ALT FLD-CCNC: 15 U/L DA — SIGNIFICANT CHANGE UP (ref 10–60)
ANION GAP SERPL CALC-SCNC: 7 MMOL/L — SIGNIFICANT CHANGE UP (ref 5–17)
AST SERPL-CCNC: 11 U/L — SIGNIFICANT CHANGE UP (ref 10–40)
BASOPHILS # BLD AUTO: 0.03 K/UL — SIGNIFICANT CHANGE UP (ref 0–0.2)
BASOPHILS NFR BLD AUTO: 0.4 % — SIGNIFICANT CHANGE UP (ref 0–2)
BILIRUB SERPL-MCNC: 0.6 MG/DL — SIGNIFICANT CHANGE UP (ref 0.2–1.2)
BUN SERPL-MCNC: 14 MG/DL — SIGNIFICANT CHANGE UP (ref 7–18)
CALCIUM SERPL-MCNC: 8.4 MG/DL — SIGNIFICANT CHANGE UP (ref 8.4–10.5)
CHLORIDE SERPL-SCNC: 109 MMOL/L — HIGH (ref 96–108)
CO2 SERPL-SCNC: 27 MMOL/L — SIGNIFICANT CHANGE UP (ref 22–31)
CREAT SERPL-MCNC: 0.69 MG/DL — SIGNIFICANT CHANGE UP (ref 0.5–1.3)
EGFR: 84 ML/MIN/1.73M2 — SIGNIFICANT CHANGE UP
EOSINOPHIL # BLD AUTO: 0.18 K/UL — SIGNIFICANT CHANGE UP (ref 0–0.5)
EOSINOPHIL NFR BLD AUTO: 2.3 % — SIGNIFICANT CHANGE UP (ref 0–6)
GLUCOSE SERPL-MCNC: 109 MG/DL — HIGH (ref 70–99)
HCT VFR BLD CALC: 36.7 % — SIGNIFICANT CHANGE UP (ref 34.5–45)
HGB BLD-MCNC: 11.8 G/DL — SIGNIFICANT CHANGE UP (ref 11.5–15.5)
IMM GRANULOCYTES NFR BLD AUTO: 0.5 % — SIGNIFICANT CHANGE UP (ref 0–0.9)
LYMPHOCYTES # BLD AUTO: 1.5 K/UL — SIGNIFICANT CHANGE UP (ref 1–3.3)
LYMPHOCYTES # BLD AUTO: 19.3 % — SIGNIFICANT CHANGE UP (ref 13–44)
MAGNESIUM SERPL-MCNC: 2.3 MG/DL — SIGNIFICANT CHANGE UP (ref 1.6–2.6)
MCHC RBC-ENTMCNC: 30.3 PG — SIGNIFICANT CHANGE UP (ref 27–34)
MCHC RBC-ENTMCNC: 32.2 GM/DL — SIGNIFICANT CHANGE UP (ref 32–36)
MCV RBC AUTO: 94.3 FL — SIGNIFICANT CHANGE UP (ref 80–100)
MONOCYTES # BLD AUTO: 0.65 K/UL — SIGNIFICANT CHANGE UP (ref 0–0.9)
MONOCYTES NFR BLD AUTO: 8.3 % — SIGNIFICANT CHANGE UP (ref 2–14)
NEUTROPHILS # BLD AUTO: 5.39 K/UL — SIGNIFICANT CHANGE UP (ref 1.8–7.4)
NEUTROPHILS NFR BLD AUTO: 69.2 % — SIGNIFICANT CHANGE UP (ref 43–77)
NRBC # BLD: 0 /100 WBCS — SIGNIFICANT CHANGE UP (ref 0–0)
PHOSPHATE SERPL-MCNC: 3.2 MG/DL — SIGNIFICANT CHANGE UP (ref 2.5–4.5)
PLATELET # BLD AUTO: 210 K/UL — SIGNIFICANT CHANGE UP (ref 150–400)
POTASSIUM SERPL-MCNC: 4 MMOL/L — SIGNIFICANT CHANGE UP (ref 3.5–5.3)
POTASSIUM SERPL-SCNC: 4 MMOL/L — SIGNIFICANT CHANGE UP (ref 3.5–5.3)
PROT SERPL-MCNC: 6.2 G/DL — SIGNIFICANT CHANGE UP (ref 6–8.3)
RBC # BLD: 3.89 M/UL — SIGNIFICANT CHANGE UP (ref 3.8–5.2)
RBC # FLD: 12.9 % — SIGNIFICANT CHANGE UP (ref 10.3–14.5)
SODIUM SERPL-SCNC: 143 MMOL/L — SIGNIFICANT CHANGE UP (ref 135–145)
WBC # BLD: 7.79 K/UL — SIGNIFICANT CHANGE UP (ref 3.8–10.5)
WBC # FLD AUTO: 7.79 K/UL — SIGNIFICANT CHANGE UP (ref 3.8–10.5)

## 2024-10-09 PROCEDURE — 99233 SBSQ HOSP IP/OBS HIGH 50: CPT

## 2024-10-09 PROCEDURE — 99239 HOSP IP/OBS DSCHRG MGMT >30: CPT

## 2024-10-09 RX ADMIN — Medication 50 MICROGRAM(S): at 08:53

## 2024-10-09 NOTE — DISCHARGE NOTE PROVIDER - NSDCCPCAREPLAN_GEN_ALL_CORE_FT
PRINCIPAL DISCHARGE DIAGNOSIS  Diagnosis: Colonic mass  Assessment and Plan of Treatment: You presented after an outpatient GI procedure (colonscopy) that tolerated well. You were given general anesthesia and there was concern for safe disposistion home, as your home health aide had left. . Colonoscopy showing large hemicircumferential, malignant-appearing ileocecal mass. Unable to resect endoscopically. The mass was biopsied and sent for pathology.   PLEASE FOLLOW UP with your outpatient Hematologist Oncologist, PCP, and Gastroenterologist for further monitoring - INCLUDING FINAL RESULTS OF PATHOLOGY for diagnosis. You may need outpatient colorectal surgery evaluation for surgical resection of this mass.         SECONDARY DISCHARGE DIAGNOSES  Diagnosis: Endometrial cancer  Assessment and Plan of Treatment: You have history of endometrial cancer. Please follow up with your primary care physician in one week to inform them of your recent hospitalization and further management of your medical conditions.      Diagnosis: HTN (hypertension)  Assessment and Plan of Treatment: You have high blood pressure. Please continue to taking your medications as prescribed., including your diuretics. Please measure your blood pressure at least once daily. Maintain a healthy diet which includes incorporating more vegetables and decreasing the amount of salt (low sodium) and sugar in your diet such as rice, bread, and pasta low sugar, low fat, low sodium diet. Exercise frequently if possible.  Please follow up with your primary care provider within one week of your discharge.      Diagnosis: Hypothyroidism  Assessment and Plan of Treatment: You have hypothyroidism. Please CONTINUE taking your levothyroxine as prescribed.   Please follow up with your primary care physician in one week to inform them of your recent hospitalization and further management of your medical conditions.

## 2024-10-09 NOTE — DISCHARGE NOTE NURSING/CASE MANAGEMENT/SOCIAL WORK - PATIENT PORTAL LINK FT
You can access the FollowMyHealth Patient Portal offered by [facility] by registering at the following website: [email or web address]. By joining [employer]’s FollowMyHealth portal, you will also be able to view your health information using other applications (apps) compatible with our system.

## 2024-10-09 NOTE — PROGRESS NOTE ADULT - SUBJECTIVE AND OBJECTIVE BOX
GI Progress Note    Patient is a 89y old  Female who presents with a chief complaint of   GI was consulted after Colonscopy.    24-HOUR INTERVAL EVENTS: Patient seen and examined on unit. Patient resting in bed. s/p Colonscopy. Tolerating diet. No n/v/d. Denies pain. Patient states she lives only 3 blocks from the hospital and inquired about when she'll be discharged. Patient also expresses nervousness and concern about the next steps following colonoscopic findings.     MEDICATIONS  (STANDING):  influenza  Vaccine (HIGH DOSE) 0.5 milliLiter(s) IntraMuscular once  levothyroxine 50 MICROGram(s) Oral daily    MEDICATIONS  (PRN):  acetaminophen     Tablet .. 650 milliGRAM(s) Oral every 6 hours PRN Temp greater or equal to 38C (100.4F), Mild Pain (1 - 3)  aluminum hydroxide/magnesium hydroxide/simethicone Suspension 30 milliLiter(s) Oral every 4 hours PRN Dyspepsia  melatonin 3 milliGRAM(s) Oral at bedtime PRN Insomnia  ondansetron Injectable 4 milliGRAM(s) IV Push every 8 hours PRN Nausea and/or Vomiting    __________________________________________________  REVIEW OF SYSTEMS:  A detailed set of ROS were asked and negative except those outlined in GI HPI above/below.   ________________________________________________  PHYSICAL EXAM    Vital Signs Last 24 Hrs  T(C): 36.8 (09 Oct 2024 05:45), Max: 36.8 (09 Oct 2024 05:45)  T(F): 98.3 (09 Oct 2024 05:45), Max: 98.3 (09 Oct 2024 05:45)  HR: 62 (09 Oct 2024 05:45) (58 - 72)  BP: 114/49 (09 Oct 2024 05:45) (99/50 - 141/90)  BP(mean): 71 (09 Oct 2024 05:45) (71 - 71)  RR: 19 (09 Oct 2024 05:45) (14 - 19)  SpO2: 97% (09 Oct 2024 05:45) (96% - 100%)    Parameters below as of 09 Oct 2024 05:45  Patient On (Oxygen Delivery Method): room air        GEN: NAD  HEENT: EOMI, conjunctivae anicteric, neck supple, moist mucous membranes  PULM: LCTAB, no wheezing, rales, or rhonchi  CV: RRR, no m/r/g  GI: soft, NT, ND; +BS in all four quadrants, no ascites, no Johnson's sign  MSK: MONTANEZ, no edema  NEURO: A&O x 3, no gross deficits  _________________________________________________  LABS:                        11.8   7.79  )-----------( 210      ( 09 Oct 2024 05:32 )             36.7     10-09    143  |  109[H]  |  14  ----------------------------<  109[H]  4.0   |  27  |  0.69    Ca    8.4      09 Oct 2024 05:32  Phos  3.2     10-09  Mg     2.3     10-09    TPro  6.2  /  Alb  2.7[L]  /  TBili  0.6  /  DBili  x   /  AST  11  /  ALT  15  /  AlkPhos  68  10-09      Urinalysis Basic - ( 09 Oct 2024 05:32 )    Color: x / Appearance: x / SG: x / pH: x  Gluc: 109 mg/dL / Ketone: x  / Bili: x / Urobili: x   Blood: x / Protein: x / Nitrite: x   Leuk Esterase: x / RBC: x / WBC x   Sq Epi: x / Non Sq Epi: x / Bacteria: x      CAPILLARY BLOOD GLUCOSE            RADIOLOGY & ADDITIONAL TESTS:        < from: Colonoscopy w/ EMR (10.08.24 @ 12:00) >  Findings:        Contents Copious amounts of semi-solid and solid brown stool was seen throughout    the colon interfering with visualization. Extensive lavage was performed but    inadequate clearance due to clogging of suction channel. Scope exchanged with    same issue.        Excavated lesions Multiple diverticula were seen in the the left side of the    colon.        Mucosa A large, approximately 6 cm, hemicircumferential, Nevaeh classification    IIc, nongranular, laterally spreading polypoid mass was found at the ileocecal    valve extending to the cecum and terminal ileum. Preparations were made for    attempted endoscopic mucosal resection. The margins were demarcated with cap    assistance. Everlift was injected with inadequate lift of the central depressed    area. This was poorly amenable to endoscopic resection given size and inadequate    lift. Multiple biopsies were performed using a large capacity cold forceps for    histology. Additional two 10 mm and 6 mm polyps one and two folds distally in    the ascending colon, not removed. Distal extent tattooed using 1 mL of ink.        Protruding lesions Medium internaland external hemorrhoids were noted.        Impressions:        Large, hemicircumferential, malignant-appearing ileocecal mass as described    above. Poorly amenable to endoscopic resection given central non-lifting sign    and size. Biopsied. Tattooed distally.          This note and its recommendations herein are preliminary until such time as cosigned by an attending.
Progress Note discussed with attending    MS TEAMS Barry Lujan OF THIS NOTE TILL 5:00 PM  PLEASE CONTACT ON CALL TEAM:  - On Call Team (Please refer to Hannah) FROM 5:00 PM - 8:30PM  - Nightfloat Team FROM 8:30 -7:30 AM    INTERVAL HPI/OVERNIGHT EVENTS: Pt examined at bedside. Pt is doing well. No overnight events. No acute complaints.     MEDICATIONS  (STANDING):  influenza  Vaccine (HIGH DOSE) 0.5 milliLiter(s) IntraMuscular once  levothyroxine 50 MICROGram(s) Oral daily    MEDICATIONS  (PRN):  acetaminophen     Tablet .. 650 milliGRAM(s) Oral every 6 hours PRN Temp greater or equal to 38C (100.4F), Mild Pain (1 - 3)  aluminum hydroxide/magnesium hydroxide/simethicone Suspension 30 milliLiter(s) Oral every 4 hours PRN Dyspepsia  melatonin 3 milliGRAM(s) Oral at bedtime PRN Insomnia  ondansetron Injectable 4 milliGRAM(s) IV Push every 8 hours PRN Nausea and/or Vomiting      REVIEW OF SYSTEMS:  CONSTITUTIONAL: No fever, weight loss, or fatigue  RESPIRATORY: No shortness of breath  CARDIOVASCULAR: No chest pain  GASTROINTESTINAL: No abdominal pain.  GENITOURINARY: No dysuria  NEUROLOGICAL: No headaches  SKIN: No itching, burning, rashes    Vital Signs Last 24 Hrs  T(C): 36.8 (09 Oct 2024 05:45), Max: 36.8 (09 Oct 2024 05:45)  T(F): 98.3 (09 Oct 2024 05:45), Max: 98.3 (09 Oct 2024 05:45)  HR: 62 (09 Oct 2024 05:45) (58 - 72)  BP: 114/49 (09 Oct 2024 05:45) (99/50 - 141/90)  BP(mean): 71 (09 Oct 2024 05:45) (71 - 71)  RR: 19 (09 Oct 2024 05:45) (14 - 19)  SpO2: 97% (09 Oct 2024 05:45) (96% - 100%)    Parameters below as of 09 Oct 2024 05:45  Patient On (Oxygen Delivery Method): room air        PHYSICAL EXAMINATION:  GENERAL: NAD, well built  HEAD:  Atraumatic, Normocephalic  EYES:  conjunctiva and sclera clear  CHEST/LUNG: Clear to auscultation. No rales, rhonchi, wheezing, or rubs  HEART: Regular rate and rhythm; No murmurs, rubs, or gallops  ABDOMEN: Soft, Nontender, Nondistended; Bowel sounds present  NERVOUS SYSTEM:  Alert & Oriented X3,    EXTREMITIES:  2+ Peripheral Pulses, No clubbing, cyanosis, or edema  SKIN: warm dry                          11.8   7.79  )-----------( 210      ( 09 Oct 2024 05:32 )             36.7     10-09    143  |  109[H]  |  14  ----------------------------<  109[H]  4.0   |  27  |  0.69    Ca    8.4      09 Oct 2024 05:32  Phos  3.2     10-09  Mg     2.3     10-09    TPro  6.2  /  Alb  2.7[L]  /  TBili  0.6  /  DBili  x   /  AST  11  /  ALT  15  /  AlkPhos  68  10-09    LIVER FUNCTIONS - ( 09 Oct 2024 05:32 )  Alb: 2.7 g/dL / Pro: 6.2 g/dL / ALK PHOS: 68 U/L / ALT: 15 U/L DA / AST: 11 U/L / GGT: x                   CAPILLARY BLOOD GLUCOSE      RADIOLOGY & ADDITIONAL TESTS:

## 2024-10-09 NOTE — DISCHARGE NOTE PROVIDER - ATTENDING DISCHARGE PHYSICAL EXAMINATION:
CONSTITUTIONAL: Well appearing, well nourished, awake, alert and in no apparent distress  ENMT: Airway patent, Nasal mucosa clear. Mouth with normal mucosa. Throat has no vesicles, no oropharyngeal exudates and uvula is midline.  EYES: Clear bilaterally, pupils equal, round and reactive to light. EOMI.  CARDIAC: Normal rate, regular rhythm.  Heart sounds S1, S2.  No murmurs, rubs or gallops   GI: abd soft, non tender, BS present  RESPIRATORY: Breath sounds clear and equal bilaterally. No wheezes, rhales or rhonchi  MUSCULOSKELETAL: Spine appears normal, range of motion is not limited, no muscle or joint tenderness  EXTREMITIES: No edema, cyanosis or deformity   NEUROLOGICAL: Alert and oriented, no focal deficits, no motor or sensory deficits.  SKIN: No rash, skin turgor

## 2024-10-09 NOTE — PROGRESS NOTE ADULT - ATTENDING COMMENTS
89 yo F with PMHx of Colon mass (just found on scope), hypothyroidism, Anxiety, CHF, Endometrial Ca, HTN, Obesity, DENIS, PAD, Sciatica who went in for outpatient GI procedure tolerated well no issues or complaints afterwards. Was given general anesthesia and home aide left pt therefore now admitted for unsafe dispo home.      This morning reports her HHA is on the way to hospital and that she needs transportation home.   No labs or imaging needed.  ROS negative no N/V/D, no f/c or pain.     Exam:   No distress   RRR   CTABL   Abdo NTND   Ext wwp      Plan:  #S/p GI outpatient scope found to have colon mass    #CHF, HTN, PAD, DENIS   #Anxiety      -Will need to speak with family/HHA to help with dispo tomorrow likely    -Per GI found to have colon mass on scope they rec if surgery can see her while inpatient though likely would not do anything while admitted this time   -Msg'ed Gen Surg PA for eval but no response.   -f/u on pathology reports outpt, GI appointment outpt, CRS outpt   -Can c/w home meds: Synthroid 50mcg    -No need for labs for now    -tylenol prn for pain and fevers  -regular diet   -Dvt ppx  -discharge home today

## 2024-10-09 NOTE — DISCHARGE NOTE PROVIDER - NSDCMRMEDTOKEN_GEN_ALL_CORE_FT
anastrozole 1 mg oral tablet: 1 tab(s) orally once a day  ascorbic acid 500 mg oral tablet: 1 tab(s) orally once a day  bumetanide 2 mg oral tablet: 1 tab(s) orally once a day  cholecalciferol oral tablet: 400 unit(s) orally once a day  escitalopram 5 mg oral tablet: 3 tab(s) orally once a day  furosemide 40 mg oral tablet: 1 tab(s) orally once a day as needed for leg swelling  levothyroxine 50 mcg (0.05 mg) oral tablet: 1 tab(s) orally once a day  lidocaine 4% topical film: Apply topically to affected area once a day  polyethylene glycol 3350 oral powder for reconstitution: 17 gram(s) orally once a day  senna leaf extract oral tablet: 1 tab(s) orally once a day (at bedtime)  TRIAMCINOLONE 0.025% CREAM: APPLY TOPICALLY TO AFFECTED AREA EVERY DAY

## 2024-10-09 NOTE — DISCHARGE NOTE PROVIDER - HOSPITAL COURSE
89F with PMHx of Colon mass (?malignant appearing, found on recent scope), hypothyroidism, Anxiety, CHF, Endometrial Ca, HTN, Obesity, DENIS, PAD, Sciatica who went in for outpatient GI procedure tolerated well. Was given general anesthesia and HHA left, thus unsafe disposition to home. Colonoscopy showing large hemicircumferential, malignant-appearing ileocecal mass. Unable to resect endoscopically.     Spoke in dept about the mass. Patient aware that biopsies taken and will have to wait for pathology report. Patient aware that she will have to follow up outpatient follow up with GI and Surgery.     Patient is able to ambulate and tolerate diet prior to discharge. Patient is stable for discharge per attending and is advised to follow up with PCP as outpatient. Please refer to patient's complete medical chart with documents for a full hospital course, for this is only a brief summary.

## 2024-10-09 NOTE — DISCHARGE NOTE PROVIDER - NSDCPNSUBOBJ_GEN_ALL_CORE
10/24 Query: To Clarify Patient had Ileocecal mass which was biopsied. We did not know the specimen results at the time of discharge which is why it was not in the dc documentation. Pathology then came back showing superficial fragments of tubulovillous adenoma. She will follow up outpatient with PCP and GI for further repeat studies.

## 2024-10-09 NOTE — DISCHARGE NOTE NURSING/CASE MANAGEMENT/SOCIAL WORK - NSDCPEFALRISK_GEN_ALL_CORE
For information on Fall & Injury Prevention, visit: [email or web address] OR  [email or web address] OR  https://www.cdc.gov/steadi/patient.html
details…

## 2024-10-09 NOTE — DISCHARGE NOTE PROVIDER - CARE PROVIDER_API CALL
Reshma Stacy  Gastroenterology  [address]  [place] [address]  Phone: [phone]  Fax: [phone]  Established Patient  Follow Up Time:     Goldberg, Eric  Internal Medicine  [address], [address]  [place] [address]  Phone: [phone]  Fax: [phone]  Established Patient  Follow Up Time:

## 2024-10-09 NOTE — PROGRESS NOTE ADULT - ASSESSMENT
Patient is a 89/ F  PMH HTN, PAD w LE stent, endometrial cancer s/p hysterectomy with lung met s/p lobectomy, chemoradiation, Hx of cecal mass s/p colonoscopy
Patient is a 89/ F  PMH HTN, PAD w LE stent, endometrial cancer s/p hysterectomy with lung met s/p lobectomy, chemoradiation, Hx of cecal mass s/p colonoscopy ( 10/8)  showing -Large, hemicircumferential, malignant-appearing ileocecal mass unable to resect endoscopically, was to be discharged home, but unable to go home after anesthesia as her HHA left for the day.    Patient seen bedside, no complaints . Denies SOB , chest pain , is AO x 3 currently, states she is hungry and anxious regarding the next steps of colonoscopy findings     VSS - /90, RR 16, T afebrile, SpO2 100 % RA , HR 72     PRIMARY TEAM KINDLY CONFIRM MEDS IN A.M.   Patient does not remember, and we are unable to reach pharmacy. Current list is entered from [employer].

## 2024-10-09 NOTE — DISCHARGE NOTE PROVIDER - PROVIDER TOKENS
PROVIDER:[TOKEN:[[id number]],ESTABLISHEDPATIENT:[T]],PROVIDER:[TOKEN:[[id number]],ESTABLISHEDPATIENT:[T]]

## 2024-10-09 NOTE — PROGRESS NOTE ADULT - PROBLEM SELECTOR PLAN 1
-s/p colonoscopy as noted above, Large, approximately 6cm eddie circumferential, malignant-appearing ileocecal mass  -Advance diet as tolerated  -F/U pathology  -F/U outpatient colorectal surgery evaluation for surgical resection   -patient admitted because of unsafe discharge after procedure yesterday after HHA escort left the hospital before patient was discharged.  -Discharge planning to home    This note and its recommendations herein are preliminary until such time as cosigned by an attending.
Hx of cecal mass s/p colonoscopy ( 10/8)  showing -Large, hemicircumferential, malignant-appearing ileocecal mass unable to resect endoscopically,    Recommend surgery consult   [ ] consider .Sx consult in AM vs OP follow up

## 2024-10-10 LAB — SURGICAL PATHOLOGY STUDY: SIGNIFICANT CHANGE UP

## 2024-10-11 ENCOUNTER — EMERGENCY (EMERGENCY)
Facility: HOSPITAL | Age: 89
LOS: 1 days | Discharge: HOME | End: 2024-10-11
Attending: EMERGENCY MEDICINE
Payer: MEDICARE

## 2024-10-11 VITALS
HEIGHT: 62 IN | TEMPERATURE: 98 F | RESPIRATION RATE: 18 BRPM | DIASTOLIC BLOOD PRESSURE: 71 MMHG | HEART RATE: 73 BPM | WEIGHT: 220.02 LBS | SYSTOLIC BLOOD PRESSURE: 154 MMHG | OXYGEN SATURATION: 97 %

## 2024-10-11 DIAGNOSIS — Z95.828 PRESENCE OF OTHER VASCULAR IMPLANTS AND GRAFTS: Chronic | ICD-10-CM

## 2024-10-11 DIAGNOSIS — Z90.2 ACQUIRED ABSENCE OF LUNG [PART OF]: Chronic | ICD-10-CM

## 2024-10-11 DIAGNOSIS — Z90.710 ACQUIRED ABSENCE OF BOTH CERVIX AND UTERUS: Chronic | ICD-10-CM

## 2024-10-11 PROCEDURE — 99284 EMERGENCY DEPT VISIT MOD MDM: CPT

## 2024-10-11 NOTE — ED ADULT TRIAGE NOTE - HEART RATE (BEATS/MIN)
Signed order for Bedside Drainage bags faxed to [facility] at fax # [phone] and confirmed 8/7/23 at 3:03 pm.
73

## 2024-10-12 VITALS
TEMPERATURE: 98 F | OXYGEN SATURATION: 98 % | SYSTOLIC BLOOD PRESSURE: 152 MMHG | RESPIRATION RATE: 18 BRPM | DIASTOLIC BLOOD PRESSURE: 73 MMHG | HEART RATE: 76 BPM

## 2024-10-12 LAB
ALBUMIN SERPL ELPH-MCNC: 2.9 G/DL — LOW (ref 3.5–5)
ALP SERPL-CCNC: 75 U/L — SIGNIFICANT CHANGE UP (ref 40–120)
ALT FLD-CCNC: 19 U/L DA — SIGNIFICANT CHANGE UP (ref 10–60)
ANION GAP SERPL CALC-SCNC: 6 MMOL/L — SIGNIFICANT CHANGE UP (ref 5–17)
AST SERPL-CCNC: 11 U/L — SIGNIFICANT CHANGE UP (ref 10–40)
BASOPHILS # BLD AUTO: 0.03 K/UL — SIGNIFICANT CHANGE UP (ref 0–0.2)
BASOPHILS NFR BLD AUTO: 0.4 % — SIGNIFICANT CHANGE UP (ref 0–2)
BILIRUB SERPL-MCNC: 0.2 MG/DL — SIGNIFICANT CHANGE UP (ref 0.2–1.2)
BUN SERPL-MCNC: 9 MG/DL — SIGNIFICANT CHANGE UP (ref 7–18)
CALCIUM SERPL-MCNC: 9.1 MG/DL — SIGNIFICANT CHANGE UP (ref 8.4–10.5)
CHLORIDE SERPL-SCNC: 110 MMOL/L — HIGH (ref 96–108)
CO2 SERPL-SCNC: 26 MMOL/L — SIGNIFICANT CHANGE UP (ref 22–31)
CREAT SERPL-MCNC: 0.75 MG/DL — SIGNIFICANT CHANGE UP (ref 0.5–1.3)
EGFR: 77 ML/MIN/1.73M2 — SIGNIFICANT CHANGE UP
EOSINOPHIL # BLD AUTO: 0.28 K/UL — SIGNIFICANT CHANGE UP (ref 0–0.5)
EOSINOPHIL NFR BLD AUTO: 3.5 % — SIGNIFICANT CHANGE UP (ref 0–6)
GLUCOSE SERPL-MCNC: 132 MG/DL — HIGH (ref 70–99)
HCT VFR BLD CALC: 41.7 % — SIGNIFICANT CHANGE UP (ref 34.5–45)
HGB BLD-MCNC: 13.5 G/DL — SIGNIFICANT CHANGE UP (ref 11.5–15.5)
IMM GRANULOCYTES NFR BLD AUTO: 0.4 % — SIGNIFICANT CHANGE UP (ref 0–0.9)
LYMPHOCYTES # BLD AUTO: 1.61 K/UL — SIGNIFICANT CHANGE UP (ref 1–3.3)
LYMPHOCYTES # BLD AUTO: 20.2 % — SIGNIFICANT CHANGE UP (ref 13–44)
MCHC RBC-ENTMCNC: 30.4 PG — SIGNIFICANT CHANGE UP (ref 27–34)
MCHC RBC-ENTMCNC: 32.4 GM/DL — SIGNIFICANT CHANGE UP (ref 32–36)
MCV RBC AUTO: 93.9 FL — SIGNIFICANT CHANGE UP (ref 80–100)
MONOCYTES # BLD AUTO: 0.64 K/UL — SIGNIFICANT CHANGE UP (ref 0–0.9)
MONOCYTES NFR BLD AUTO: 8 % — SIGNIFICANT CHANGE UP (ref 2–14)
NEUTROPHILS # BLD AUTO: 5.39 K/UL — SIGNIFICANT CHANGE UP (ref 1.8–7.4)
NEUTROPHILS NFR BLD AUTO: 67.5 % — SIGNIFICANT CHANGE UP (ref 43–77)
NRBC # BLD: 0 /100 WBCS — SIGNIFICANT CHANGE UP (ref 0–0)
PLATELET # BLD AUTO: 223 K/UL — SIGNIFICANT CHANGE UP (ref 150–400)
POTASSIUM SERPL-MCNC: 4.2 MMOL/L — SIGNIFICANT CHANGE UP (ref 3.5–5.3)
POTASSIUM SERPL-SCNC: 4.2 MMOL/L — SIGNIFICANT CHANGE UP (ref 3.5–5.3)
PROT SERPL-MCNC: 7 G/DL — SIGNIFICANT CHANGE UP (ref 6–8.3)
RBC # BLD: 4.44 M/UL — SIGNIFICANT CHANGE UP (ref 3.8–5.2)
RBC # FLD: 12.7 % — SIGNIFICANT CHANGE UP (ref 10.3–14.5)
SODIUM SERPL-SCNC: 142 MMOL/L — SIGNIFICANT CHANGE UP (ref 135–145)
T4 AB SER-ACNC: 9.1 UG/DL — SIGNIFICANT CHANGE UP (ref 4.6–12)
TSH SERPL-MCNC: 5.37 UU/ML — HIGH (ref 0.34–4.82)
WBC # BLD: 7.98 K/UL — SIGNIFICANT CHANGE UP (ref 3.8–10.5)
WBC # FLD AUTO: 7.98 K/UL — SIGNIFICANT CHANGE UP (ref 3.8–10.5)

## 2024-10-12 PROCEDURE — 84443 ASSAY THYROID STIM HORMONE: CPT

## 2024-10-12 PROCEDURE — 84436 ASSAY OF TOTAL THYROXINE: CPT

## 2024-10-12 PROCEDURE — 80053 COMPREHEN METABOLIC PANEL: CPT

## 2024-10-12 PROCEDURE — 85025 COMPLETE CBC W/AUTO DIFF WBC: CPT

## 2024-10-12 PROCEDURE — 99283 EMERGENCY DEPT VISIT LOW MDM: CPT

## 2024-10-12 PROCEDURE — 36415 COLL VENOUS BLD VENIPUNCTURE: CPT

## 2024-10-12 RX ORDER — SODIUM CHLORIDE 0.9 % (FLUSH) 0.9 %
500 SYRINGE (ML) INJECTION ONCE
Refills: 0 | Status: DISCONTINUED | OUTPATIENT
Start: 2024-10-12 | End: 2024-10-15

## 2024-10-12 NOTE — ED PROVIDER NOTE - OBJECTIVE STATEMENT
89-year-old female presents with concern that she is overdosed because she took extra dose of Synthroid.  Patient had a colonoscopy 24 hours ago.  She was discharged and slept at home.  She thought since she slept so much that it was the next morning so she took her Synthroid dose but in fact it was just the evening.  For this reason she took 2 Synthroid to 1 day and she is very afraid that she overdosed.  She denies palpitations shortness of breath chest pain dizziness lightheadedness.

## 2024-10-12 NOTE — ED ADULT NURSE NOTE - NS ED NOTE ABUSE SUSPICION NEGLECT YN
[No] : In the past 12 months have you used drugs other than those required for medical reasons? No [0] : 2) Feeling down, depressed, or hopeless: Not at all (0) [Patient reported mammogram was normal] : Patient reported mammogram was normal [No falls in past year] : Patient reported no falls in the past year No [PHQ-2 Negative - No further assessment needed] : PHQ-2 Negative - No further assessment needed [de-identified] : yogs/ arthritis /walking [de-identified] : reg [[id number]] : 0 [EyeExamDate] : 1/1/23 [Patient declined PAP Smear] : Patient declined PAP Smear [Change in mental status noted] : No change in mental status noted [None] : None [With Family] : lives with family [] :  [Fully functional (bathing, dressing, toileting, transferring, walking, feeding)] : Fully functional (bathing, dressing, toileting, transferring, walking, feeding) [Fully functional (using the telephone, shopping, preparing meals, housekeeping, doing laundry, using] : Fully functional and needs no help or supervision to perform IADLs (using the telephone, shopping, preparing meals, housekeeping, doing laundry, using transportation, managing medications and managing finances) [Reports changes in hearing] : Reports no changes in hearing [Smoke Detector] : smoke detector [Carbon Monoxide Detector] : carbon monoxide detector [Guns at Home] : no guns at home [Seat Belt] :  uses seat belt [Sunscreen] : uses sunscreen [MammogramDate] : 09/2022 [BoneDensityDate] : 9/6/22 [ColonoscopyDate] : 01/2019 [With Patient/Caregiver] : , with patient/caregiver [AdvancecareDate] : 8/23 [Former] : Former [20 or more] : 20 or more [> 15 Years] : > 15 Years

## 2024-10-12 NOTE — ED PROVIDER NOTE - PHYSICAL EXAMINATION
General: Well appearing, no acute distress, appears stated age  HEENT: normocephalic, atraumatic   Respiratory: normal work of breathing  MSK: no swelling or tenderness of lower extremities, moving all extremities spontaneously   Skin: warm, dry  Neuro: A&Ox3, cranial nerves II-XII intact, 5/5 strength in all extremities, no sensory deficits, normal gait   Psych:   Irritable

## 2024-10-12 NOTE — ED ADULT NURSE NOTE - OBJECTIVE STATEMENT
Patient presents to ED reporting she took and extra dose Synthroid 50mg after waking up believing it was morning. Denies chest pain/headache

## 2024-10-12 NOTE — ED PROVIDER NOTE - PATIENT PORTAL LINK FT
You can access the FollowMyHealth Patient Portal offered by [facility] by registering at the following website: [email or web address]. By joining [employer]’s FollowMyHealth portal, you will also be able to view your health information using other applications (apps) compatible with our system.

## 2024-10-12 NOTE — ED PROVIDER NOTE - NSFOLLOWUPINSTRUCTIONS_ED_ALL_ED_FT
Dear Grace,     I am glad that you are feeling better since you have been here.    As we discussed, taking one extra dose of your thyroid medication would not cause any clinically significant effects on your health or give you any symptoms. That would only be the case if you took multiple extra meds at one time, or a higher dose/double dose many days in a row.      In addition, it is normal to have prolonged mild feeling of disorientation after sedation for your colonoscopy, especially since it was a longer procedure.  Since you are feeling well now, almost back to your baseline, we can be comfortable that it is flushing out and you are on the right track.    We have sent your labs, you can download the FOLLOW MY HEALTH miguel ángel or call is at  to get your results.    I'm glad you are on the road to recovery!    Good luck with your continued care    With warmth,   Dr. Christine Acuña

## 2024-10-12 NOTE — ED PROVIDER NOTE - CLINICAL SUMMARY MEDICAL DECISION MAKING FREE TEXT BOX
Inform patient that it is 1 extra dose of Synthroid can have no clinical significance and she is asymptomatic and her vitals are normal.

## 2024-10-19 NOTE — DIETITIAN INITIAL EVALUATION ADULT - NS FNS DIET ORDER
PEDIATRIC NEUROLOGY       Name: Kevin Maldonado  PCP:  Tiffany Rhodes MD  ADMISSION DATE:  10/17/2024  ATTENDING PHYSICIAN:  Fady Moseley MD  DATE:  10/19/2024  CONSULTING PHYSICIAN:   Katharine Zeng MD    Consults   Neurological consultation requested for  gait difficulty.  History obtained via aid of the chart and family at bedside.     Initial Consultation  10/18  Details of presenting history have been reviewed in the medical record.  Additionally progress since admission, has also been reviewed extensively from the medical record and conversations with primary team.      Initially evaluated on the inpatient service for gait difficulty as documented below. Since admission reflexes appear to have diminished. No bulbar involvement. No autonomic instabiltiy. Imaging today    Interval History  10/19  Details of presenting history have been reviewed in the medical record.  Additionally progress since admission, has also been reviewed extensively from the medical record and conversations with primary team.   This am dad states when ambulating to bathroom, still unsteady and requiring assistance but no vertiginous symptoms  Imaging unremarkable     Problem List  Gait Difficulty  Onset: 2 weeks ago - viral illness, dad with same, self resolved, at baseline  Last Saturday - went to farm in [place], fed some animal  Next day Sunday-  emesis x 1 but no other issues  Tuesday PTA: woke up diff walking, but able to got to bathroom, dizzy, no vertigo, some HA, but went to school, mom had to  and then later in day had low energy, worsening gait hungry but vomiting  Wednesday: worsening gait (wide based, unsteady) went to , abx for OM, still dec actiivty, vomiting, wants to eat but can't  Thursday: went to PMD who sent to ED  Friday: since admission, wide based, unsteady, needs help  Triggers: ? Viral illness  no loss of vision  no diplopia  no focal numbness  no focal weakness  no positional quality  no nighttime  awakening with emesis  Mild photophobia  no phonophobia  no tinnitus  no dysarthria/slurred speech  Gait - wide based, unsteady  no periods of non responsiveness  no incontinence  no seizure-like activity  no tremors  no altered mental status  no nausea/vomiting  no dizziness  no vertiginous symptoms - floor doesn't rock, walls /things dont spin around him  no nystagmus  + fatigue, dec actiivty  Intermittent hA - resolved  no heat intolerance  no patchy sensory changes  no mood changes  no cognitive changes    Developmental Progress  At 8 year old-  on target  Regression: no  Therapy:no    Prior Evaluation  MRI brain, C/T/L spine (10/18)  MRA/MRV: no  EEG: no    Birth History   - repeat  Full Term 41   A[gars:  @ 1/5 mins  NICU Stay - no  No complications with the pregnancy or delivery      Developmental History  On target   Rolled: <6 month  Crawled: <10 month  Walked: 12 month   Regression: no  Therapy: no    ROS  Complete review of systems reviewed, pertinent findings noted above, remaining review of systems otherwise negative.    Past Medical History  Viral illnesss    Family History  No seizures, developmental delay, early childhood deaths, chromosomal disorders or other neurological disorders.    Social History  See EMR    Medications  Current Facility-Administered Medications   Medication Dose Route Frequency Provider Last Rate Last Admin    ondansetron (ZOFRAN) injection 2.2 mg  0.1 mg/kg (Dosing Weight) Intravenous Q6H PRN Kristian Xiong MD   2.2 mg at 10/18/24 1327    lidocaine (ANECREAM/LMX) 4 % cream 1 application.  1 application. Topical PRN Kamryn Redmond DO   1 application. at 10/18/24 1058    sodium chloride 0.9 % injection 0.6-4.6 mL  0.6-4.6 mL Intravenous PRN Kamryn Redmond,         sodium chloride 0.9 % flush bag 25 mL  25 mL Intravenous PRN Kamryn Redmond,         sodium chloride 0.9 % injection 0.5-10 mL  0.5-10 mL Intravenous PRN Kamryn Redmond, DO        cefTRIAXone 1,100 mg  pediatric syringe  1,100 mg Intravenous Q12H Kamryn Redmond DO   1,100 mg at 10/19/24 0528       Allergies  ALLERGIES:  No Known Allergies      I have reviewed the information as listed in the medical record, medications, allergies, past medical, surgical, social and family histories were reviewed and updated as appropriate.    VITALS    Vital Last Value 24 Hour Range   Temperature 98.1 °F (36.7 °C) (10/19/24 1108) Temp  Min: 97.2 °F (36.2 °C)  Max: 98.6 °F (37 °C)   Pulse 94 (10/19/24 1108) Pulse  Min: 53  Max: 117   Respiratory 20 (10/19/24 1108) Resp  Min: 15  Max: 26   Non-Invasive  Blood Pressure 101/70 (10/19/24 1108) BP  Min: 87/58  Max: 120/84   Pulse Oximetry 94 % (10/19/24 1108) SpO2  Min: 93 %  Max: 100 %   Arterial   Blood Pressure   No data recorded        Physical Exam    Constitutional: sleeping but arouses and requires encouragement to complete exam  HENT: Atraumatic. No signs of injury. Mucous membranes are moist.   Eyes: Conjunctiva/sclera: Conjunctivae normal.   Pulmonary: Effort: Pulmonary effort is normal. No respiratory distress.   Genitourinary: Deferred  Musculoskeletal:  No swelling. Normal range of motion.   Skin:Skin is warm and dry.   Capillary Refill: Capillary refill takes less than 2 seconds.  Neurological Exam   Mental Status: Awake, alert, interactive. At times exam limited and requires encouragement  Cranial Nerves: Pupils react bilaterally. No nystagmus. Able to track and follow. Symmetric grimace. No obvious facial asymmetry. Good cough  Motor: symmetric withdrawal of all extremities spontaneously and in response to pain, 5/5, Tone and bulk appear to be normal. No focal weakness  Sensory: Symmetric withdrawal of all extremities spontaneously and in response to pain and cold temp, no sensory level,  Reflexes: Intact BUE's, BLE - unobtainable (sleeping), No clonus. Babinski's equivocal/withdraws symm.  Coordination: FTN bilateral ppt, AFM intact    Gait: wide based unsteady, needs  assistance per dad, did not walk due to compliance        Impression  Kevin is a pleasant 8 year old male initially evaluated on the inpatient service for acute onset gait difficulty as documented below. Since admission reflexes appear to have diminished. No bulbar involvement. No autonomic instabiltiy. Imaging  of brain and spine unremarkable    I have discussed with the family potential etiologies for constellation of symptoms which may include but are not limited to the following: post infectious neuro inflammatory disorders (Lp pending), environmental /post infectious (was at a remote farm in [place]), structural abnormalities, vascular insufficiencies,trauma, familial tendencies and idiopathic.     Diagnostic and therapeutic options going forward were reviewed.  Family feels comfortable with below recommendations.    Recommendations  Imaging  MRI brain with and without contrast (10/18)  MRI  cervical, thoracic, lumbar spine with and without contrast (10/18)    CSF  oligoclonal bands (MS panel)  Send outs to [facility], ordered as miscellaneous test:   Oyster Id  Test Order Name       PCDEC  Autoimmune CSF encephalitis panel, CSF   NMOFC  NMO/AQP4 FACS, CSF  PAC1  Paraneoplastic Autoantibody Eval,CSF    BLOOD  Send outs to [facility], ordered as miscellaneous test:   [employer] Id  Test Order Name       PCDES Peds Autoimm Enceph CNS, S     (this includes NMDA-R Ab IF Titer Assay, S)  PAVAL Paraneoplastic Autoantibody Eval, S  FGQ1B Ganglioside GQ1b Ab (IgG)    BLOOD & CSF (must draw at same time)  [employer]Test Id  Test Order Name       MSP3 Multiple Sclerosis     Therapy Options   Given cont areflexia and diff with gait, I have reviewed the risks, benefits and side effects of monitoring clinically without medications versus initiating  IVIG. Dad comfortable with IVIG over 3 days given Kevin wanting to go home if possible.  IVIG 0.4mg/kg/day x 3 days (total dose of 2 gram/kg divided over 3 days). If not tolerated then  divid eover 5 days.  Confirm dosing and premedication with pharmacy. Appreciate input.      May consider  IV methylprednisone vs Plasmapheresis pending clinical progress      Monitor resp status closly      I have discussed the nature of his condition, course, prognosis, plan, recommendations and outcome extensively with the primary service and family at bedside.  Family has expressed verbal understanding and is in agreement with plan. All Questions Have Been Answered.    We will follow the child clinically.    Thank you for allowing me to participate in his care.     Katharine Zeng MD  Pediatric Neurology   [facility]      DASH/TLC

## 2024-10-22 ENCOUNTER — APPOINTMENT (OUTPATIENT)
Dept: GASTROENTEROLOGY | Facility: CLINIC | Age: 89
End: 2024-10-22
Payer: MEDICARE

## 2024-10-22 DIAGNOSIS — D49.0 NEOPLASM OF UNSPECIFIED BEHAVIOR OF DIGESTIVE SYSTEM: ICD-10-CM

## 2024-10-22 PROCEDURE — 99443: CPT

## 2024-10-25 NOTE — ED ADULT NURSE NOTE - CINV DISCH TEACH PARTICIP
Medication:    Name from pharmacy: Atorvastatin Calcium 10 MG Oral Tablet    passed protocol.   Last office visit date:8/27/2024  Next appointment scheduled?: Yes   Number of refills given: 2  
Pt here for C.5D.36.  Arrives ambulatory.  Denies any new complaints.  Labs drawn from port, results reviewed.  Pt was seen by Dr. Bradley, order rec'd to proceed with tx.  Tx complete without incident.  Pt d/c'd in stable condition.  Returns 11/1/2024 for C5D43 keytruda.    
Patient

## 2024-11-06 ENCOUNTER — NON-APPOINTMENT (OUTPATIENT)
Age: 89
End: 2024-11-06

## 2024-11-06 ENCOUNTER — APPOINTMENT (OUTPATIENT)
Dept: COLORECTAL SURGERY | Facility: CLINIC | Age: 89
End: 2024-11-06
Payer: MEDICARE

## 2024-11-06 VITALS
SYSTOLIC BLOOD PRESSURE: 134 MMHG | HEART RATE: 70 BPM | OXYGEN SATURATION: 95 % | HEIGHT: 62 IN | BODY MASS INDEX: 40.48 KG/M2 | DIASTOLIC BLOOD PRESSURE: 76 MMHG | WEIGHT: 220 LBS

## 2024-11-06 DIAGNOSIS — D49.0 NEOPLASM OF UNSPECIFIED BEHAVIOR OF DIGESTIVE SYSTEM: ICD-10-CM

## 2024-11-06 DIAGNOSIS — G40.A09 ABSENCE EPILEPTIC SYNDROME, NOT INTRACTABLE, W/OUT STATUS EPILEPTICUS: ICD-10-CM

## 2024-11-06 DIAGNOSIS — R56.9 UNSPECIFIED CONVULSIONS: ICD-10-CM

## 2024-11-06 DIAGNOSIS — J45.20 MILD INTERMITTENT ASTHMA, UNCOMPLICATED: ICD-10-CM

## 2024-11-06 PROCEDURE — 99204 OFFICE O/P NEW MOD 45 MIN: CPT

## 2024-11-06 RX ORDER — LEVOTHYROXINE SODIUM 0.17 MG/1
TABLET ORAL
Refills: 0 | Status: ACTIVE | COMMUNITY

## 2024-11-06 RX ORDER — MULTIVITAMIN
TABLET ORAL
Refills: 0 | Status: ACTIVE | COMMUNITY

## 2024-11-06 RX ORDER — FUROSEMIDE 80 MG/1
TABLET ORAL
Refills: 0 | Status: ACTIVE | COMMUNITY

## 2024-11-08 RX ORDER — NEOMYCIN SULFATE 500 MG/1
500 TABLET ORAL 3 TIMES DAILY
Qty: 3 | Refills: 0 | Status: ACTIVE | COMMUNITY
Start: 2024-11-08 | End: 1900-01-01

## 2024-11-08 RX ORDER — METRONIDAZOLE 250 MG/1
250 TABLET ORAL 3 TIMES DAILY
Qty: 3 | Refills: 0 | Status: ACTIVE | COMMUNITY
Start: 2024-11-08 | End: 1900-01-01

## 2024-11-21 ENCOUNTER — APPOINTMENT (OUTPATIENT)
Dept: CT IMAGING | Facility: CLINIC | Age: 89
End: 2024-11-21
Payer: MEDICARE

## 2024-11-21 ENCOUNTER — RESULT REVIEW (OUTPATIENT)
Age: 89
End: 2024-11-21

## 2024-11-21 PROCEDURE — 71260 CT THORAX DX C+: CPT

## 2024-11-21 PROCEDURE — 74177 CT ABD & PELVIS W/CONTRAST: CPT

## 2024-11-26 ENCOUNTER — OUTPATIENT (OUTPATIENT)
Dept: OUTPATIENT SERVICES | Facility: HOSPITAL | Age: 89
LOS: 1 days | End: 2024-11-26

## 2024-11-26 VITALS
HEIGHT: 62 IN | HEART RATE: 66 BPM | DIASTOLIC BLOOD PRESSURE: 84 MMHG | WEIGHT: 235.89 LBS | RESPIRATION RATE: 16 BRPM | OXYGEN SATURATION: 96 % | SYSTOLIC BLOOD PRESSURE: 134 MMHG | TEMPERATURE: 98 F

## 2024-11-26 DIAGNOSIS — K63.89 OTHER SPECIFIED DISEASES OF INTESTINE: ICD-10-CM

## 2024-11-26 DIAGNOSIS — R06.02 SHORTNESS OF BREATH: ICD-10-CM

## 2024-11-26 DIAGNOSIS — R06.83 SNORING: ICD-10-CM

## 2024-11-26 DIAGNOSIS — Z95.828 PRESENCE OF OTHER VASCULAR IMPLANTS AND GRAFTS: Chronic | ICD-10-CM

## 2024-11-26 DIAGNOSIS — Z90.710 ACQUIRED ABSENCE OF BOTH CERVIX AND UTERUS: Chronic | ICD-10-CM

## 2024-11-26 DIAGNOSIS — Z90.2 ACQUIRED ABSENCE OF LUNG [PART OF]: Chronic | ICD-10-CM

## 2024-11-26 DIAGNOSIS — Z98.49 CATARACT EXTRACTION STATUS, UNSPECIFIED EYE: Chronic | ICD-10-CM

## 2024-11-26 LAB
A1C WITH ESTIMATED AVERAGE GLUCOSE RESULT: 6.1 % — HIGH (ref 4–5.6)
ANION GAP SERPL CALC-SCNC: 12 MMOL/L — SIGNIFICANT CHANGE UP (ref 7–14)
BLD GP AB SCN SERPL QL: NEGATIVE — SIGNIFICANT CHANGE UP
BUN SERPL-MCNC: 17 MG/DL — SIGNIFICANT CHANGE UP (ref 7–23)
CALCIUM SERPL-MCNC: 9.5 MG/DL — SIGNIFICANT CHANGE UP (ref 8.4–10.5)
CHLORIDE SERPL-SCNC: 104 MMOL/L — SIGNIFICANT CHANGE UP (ref 98–107)
CO2 SERPL-SCNC: 22 MMOL/L — SIGNIFICANT CHANGE UP (ref 22–31)
CREAT SERPL-MCNC: 0.68 MG/DL — SIGNIFICANT CHANGE UP (ref 0.5–1.3)
EGFR: 84 ML/MIN/1.73M2 — SIGNIFICANT CHANGE UP
ESTIMATED AVERAGE GLUCOSE: 128 — SIGNIFICANT CHANGE UP
GLUCOSE SERPL-MCNC: 110 MG/DL — HIGH (ref 70–99)
POTASSIUM SERPL-MCNC: 4.1 MMOL/L — SIGNIFICANT CHANGE UP (ref 3.5–5.3)
POTASSIUM SERPL-SCNC: 4.1 MMOL/L — SIGNIFICANT CHANGE UP (ref 3.5–5.3)
RH IG SCN BLD-IMP: POSITIVE — SIGNIFICANT CHANGE UP
RH IG SCN BLD-IMP: POSITIVE — SIGNIFICANT CHANGE UP
SODIUM SERPL-SCNC: 138 MMOL/L — SIGNIFICANT CHANGE UP (ref 135–145)

## 2024-11-26 PROCEDURE — 85025 COMPLETE CBC W/AUTO DIFF WBC: CPT

## 2024-11-26 PROCEDURE — 83735 ASSAY OF MAGNESIUM: CPT

## 2024-11-26 PROCEDURE — 84100 ASSAY OF PHOSPHORUS: CPT

## 2024-11-26 PROCEDURE — 88305 TISSUE EXAM BY PATHOLOGIST: CPT

## 2024-11-26 PROCEDURE — 80053 COMPREHEN METABOLIC PANEL: CPT

## 2024-11-26 PROCEDURE — 36415 COLL VENOUS BLD VENIPUNCTURE: CPT

## 2024-11-26 RX ORDER — LEVOTHYROXINE SODIUM 150 MCG
1 TABLET ORAL
Refills: 0 | DISCHARGE

## 2024-11-26 RX ORDER — SODIUM CHLORIDE 9 MG/ML
3 INJECTION, SOLUTION INTRAMUSCULAR; INTRAVENOUS; SUBCUTANEOUS EVERY 8 HOURS
Refills: 0 | Status: DISCONTINUED | OUTPATIENT
Start: 2024-12-02 | End: 2024-12-09

## 2024-11-26 RX ORDER — CHLORHEXIDINE GLUCONATE 1.2 MG/ML
1 RINSE ORAL DAILY
Refills: 0 | Status: DISCONTINUED | OUTPATIENT
Start: 2024-12-02 | End: 2024-12-09

## 2024-11-26 RX ORDER — FUROSEMIDE 40 MG/1
0 TABLET ORAL
Refills: 0 | DISCHARGE

## 2024-11-26 RX ORDER — GABAPENTIN 300 MG/1
600 CAPSULE ORAL ONCE
Refills: 0 | Status: DISCONTINUED | OUTPATIENT
Start: 2024-12-02 | End: 2024-12-09

## 2024-11-26 NOTE — H&P PST ADULT - PROBLEM SELECTOR PLAN 1
Scheduled for laparoscopic robotic right hemicolectomy  Preop instructions provided and patient verbalizes understanding.  Hibiclens and Famotidine provided with instructions.  BMP, CBC, HgAIC, T&S results  pending

## 2024-11-26 NOTE — H&P PST ADULT - PROBLEM SELECTOR PLAN 4
Postoperative Delirium Screen    Patient eligible for alexandro risk screen age>75? (if <= 75 then done) 89    Health care proxy paperwork given to patient? Yes (all patients should be given the packet to fill out at home and return on day of surgery to pre-op RN)    Impaired mobility (ie: uses cane, walker, wheelchair, or assist device)? Yes    Known dementia diagnosis? no    Impaired functional status (METS<4)? Yes    Malnutrition BMI<20? Yes/no

## 2024-11-26 NOTE — H&P PST ADULT - LAST CARDIAC ANGIOGRAM/IMAGING
11/21/24 ct of the chest VESSELS: Aortic calcifications. Coronary artery calcifications and stenting. HEART: Heart size is normal. No pericardial effusion.

## 2024-11-26 NOTE — H&P PST ADULT - GENITOURINARY COMMENTS
iliac  artery occlusion iliac stent, Hysterectomy performed for endometrial cancer. PAD, left iliac  artery occlusion s/p left   iliac stent, Hysterectomy performed for endometrial cancer.

## 2024-11-26 NOTE — H&P PST ADULT - NSANTHGENDERRD_ENT_A_CORE
Asthma & COPD Medication Protocol Failed 07/04/2022 08:16 PM    Asthma Action Score greater than or equal to 20    Appointment in past 6 or next 3 months     AAP/ACT given in last 12 months No

## 2024-11-26 NOTE — H&P PST ADULT - NEGATIVE ENMT SYMPTOMS
no hearing difficulty/no tinnitus/no vertigo/no sinus symptoms/no nasal congestion/no nasal discharge/no nasal obstruction/no post-nasal discharge/no nose bleeds/no recurrent cold sores/no gum bleeding/no throat pain/no dysphagia

## 2024-11-26 NOTE — H&P PST ADULT - NSICDXPASTSURGICALHX_GEN_ALL_CORE_FT
PAST SURGICAL HISTORY:  C Section 1965     cyst removed from the neck 1966     H/O: hysterectomy     History of Dilatation and Curettage (ICD9 V45.89)     S/P cataract surgery     S/P partial lobectomy of lung     Status post insertion of iliac artery stent

## 2024-11-26 NOTE — H&P PST ADULT - ALLERGIC/IMMUNOLOGIC COMMENTS
[Dear  ___] : Dear  [unfilled], [Consult Letter:] : I had the pleasure of evaluating your patient, [unfilled]. [Please see my note below.] : Please see my note below. [Consult Closing:] : Thank you very much for allowing me to participate in the care of this patient.  If you have any questions, please do not hesitate to contact me. [Sincerely,] : Sincerely, [DrBlanca  ___] : Dr. MARIN refer to allergy section

## 2024-11-26 NOTE — H&P PST ADULT - CARDIOVASCULAR COMMENTS
CAD coronary artery stent 2014 [place] CHF no hospitalization or recent exacerbation, no intubation

## 2024-11-26 NOTE — H&P PST ADULT - NSICDXPASTMEDICALHX_GEN_ALL_CORE_FT
PAST MEDICAL HISTORY:  Acne Cystica (ICD9 706.1)     Anxiety     CHF (Congestive Heart Failure) (ICD9 428.0)     Endometrial cancer     Hypertension     Obesity (ICD9 278.00)     DENIS (Obstructive Sleep Apnea) (ICD9 327.23)     Peripheral arterial disease     Sciatica     systemic edema      PAST MEDICAL HISTORY:  Acne Cystica (ICD9 706.1)     Anxiety     CHF (Congestive Heart Failure) (ICD9 428.0)     Endometrial cancer     Hypertension     Mass of colon     Obesity (ICD9 278.00)     DENIS (Obstructive Sleep Apnea) (ICD9 327.23)     Peripheral arterial disease     Sciatica     systemic edema

## 2024-11-26 NOTE — H&P PST ADULT - HISTORY OF PRESENT ILLNESS
89 yo female presents for evaluation of a large cecal polyp. Patient with previous history of  section, and hysterectomy. An appendectomy was performed at time of hysterectomy. Hysterectomy performed for endometrial cancer. Patient also with partial pneumonectomy for lung cancer.. Patient was noted to be anemic. A CT scan performed showed possible cecal mass. She underwent a colonoscopy and the mass was identified biopsied and returned as tubulovillous adenoma. She was referred to advanced endoscopy who also biopsied the lesion, we deemed it not amenable to endoscopic resection. Biopsies once again returned as tubulovillous adenoma. She presents today to discuss surgical options.  88 yo female Endometrial cancer s/p Hysterectomy,  partial lobectomy  for lung cancer.. Patient was noted to be anemic. A CT scan performed showed possible cecal mass. She underwent a colonoscopy and the mass was identified biopsied and returned as tubulovillous adenoma. She was referred to advanced endoscopy who also biopsied the lesion, Pt went for evaluation of a large cecal polyp by surgeon and Now present in Presurgical testing for preop evaluation for scheduled procedure laparoscopic/robotic right hemicolectomy 89 yo female Endometrial cancer s/p Hysterectomy,  partial lobectomy  for lung cancer.. Patient was noted to be anemic. A CT scan performed showed possible cecal mass. She underwent a colonoscopy and the mass was identified biopsied and returned as tubulovillous adenoma. She was referred to advanced endoscopy who also biopsied the lesion, Pt went for evaluation of  cecal polyp by surgeon and Now present in Presurgical testing for preop evaluation for scheduled procedure laparoscopic/robotic right hemicolectomy

## 2024-11-26 NOTE — H&P PST ADULT - NEUROLOGICAL SYMPTOMS
loss equilibrium impairment loss equilibrium impairment, lower extremities/weakness/difficulty walking

## 2024-11-26 NOTE — H&P PST ADULT - ATTENDING COMMENTS
Cecal mass  -Pt for robotic/laparoscopic colectomy  -Risks and benefits reviewed  -Bowel prep given  -clearance obtained as an outpt

## 2024-11-27 RX ORDER — CELECOXIB 200 MG/1
400 CAPSULE ORAL ONCE
Refills: 0 | Status: DISCONTINUED | OUTPATIENT
Start: 2024-12-02 | End: 2024-12-03

## 2024-12-01 ENCOUNTER — INPATIENT (INPATIENT)
Facility: HOSPITAL | Age: 89
LOS: 7 days | Discharge: NURSING, REHAB OR LONG-TERM CARE | End: 2024-12-09
Attending: STUDENT IN AN ORGANIZED HEALTH CARE EDUCATION/TRAINING PROGRAM | Admitting: STUDENT IN AN ORGANIZED HEALTH CARE EDUCATION/TRAINING PROGRAM
Payer: MEDICARE

## 2024-12-01 VITALS
HEART RATE: 81 BPM | DIASTOLIC BLOOD PRESSURE: 83 MMHG | TEMPERATURE: 98 F | SYSTOLIC BLOOD PRESSURE: 145 MMHG | RESPIRATION RATE: 18 BRPM | OXYGEN SATURATION: 97 %

## 2024-12-01 DIAGNOSIS — Z95.828 PRESENCE OF OTHER VASCULAR IMPLANTS AND GRAFTS: Chronic | ICD-10-CM

## 2024-12-01 DIAGNOSIS — Z90.2 ACQUIRED ABSENCE OF LUNG [PART OF]: Chronic | ICD-10-CM

## 2024-12-01 DIAGNOSIS — Z90.710 ACQUIRED ABSENCE OF BOTH CERVIX AND UTERUS: Chronic | ICD-10-CM

## 2024-12-01 DIAGNOSIS — K63.89 OTHER SPECIFIED DISEASES OF INTESTINE: ICD-10-CM

## 2024-12-01 DIAGNOSIS — Z98.49 CATARACT EXTRACTION STATUS, UNSPECIFIED EYE: Chronic | ICD-10-CM

## 2024-12-01 RX ORDER — POLYETHYLENE GLYCOL 3350 17 G/17G
17 POWDER, FOR SOLUTION ORAL
Refills: 0 | Status: COMPLETED | OUTPATIENT
Start: 2024-12-01 | End: 2024-12-02

## 2024-12-01 RX ORDER — NEOMYCIN SULFATE 500 MG/1
1000 TABLET ORAL
Refills: 0 | Status: DISCONTINUED | OUTPATIENT
Start: 2024-12-01 | End: 2024-12-01

## 2024-12-01 RX ORDER — FUROSEMIDE 40 MG/1
40 TABLET ORAL DAILY
Refills: 0 | Status: DISCONTINUED | OUTPATIENT
Start: 2024-12-01 | End: 2024-12-02

## 2024-12-01 RX ORDER — ENOXAPARIN SODIUM 30 MG/.3ML
40 INJECTION SUBCUTANEOUS EVERY 24 HOURS
Refills: 0 | Status: DISCONTINUED | OUTPATIENT
Start: 2024-12-01 | End: 2024-12-01

## 2024-12-01 RX ORDER — CHOLECALCIFEROL (VITAMIN D3) 10MCG/0.25
2000 DROPS ORAL DAILY
Refills: 0 | Status: DISCONTINUED | OUTPATIENT
Start: 2024-12-01 | End: 2024-12-09

## 2024-12-01 RX ORDER — MULTIVIT WITH MINERALS/LUTEIN
500 TABLET ORAL DAILY
Refills: 0 | Status: DISCONTINUED | OUTPATIENT
Start: 2024-12-01 | End: 2024-12-09

## 2024-12-01 RX ORDER — NEOMYCIN SULFATE 500 MG/1
500 TABLET ORAL EVERY 8 HOURS
Refills: 0 | Status: COMPLETED | OUTPATIENT
Start: 2024-12-01 | End: 2024-12-02

## 2024-12-01 RX ORDER — POLYETHYLENE GLYCOL 3350 17 G/17G
17 POWDER, FOR SOLUTION ORAL
Refills: 0 | Status: DISCONTINUED | OUTPATIENT
Start: 2024-12-01 | End: 2024-12-01

## 2024-12-01 RX ORDER — LEVOTHYROXINE SODIUM 150 MCG
50 TABLET ORAL DAILY
Refills: 0 | Status: DISCONTINUED | OUTPATIENT
Start: 2024-12-01 | End: 2024-12-09

## 2024-12-01 RX ORDER — METRONIDAZOLE 500 MG/1
1000 TABLET ORAL
Refills: 0 | Status: DISCONTINUED | OUTPATIENT
Start: 2024-12-01 | End: 2024-12-01

## 2024-12-01 RX ORDER — CHOLECALCIFEROL (VITAMIN D3) 10MCG/0.25
12000 DROPS ORAL DAILY
Refills: 0 | Status: DISCONTINUED | OUTPATIENT
Start: 2024-12-01 | End: 2024-12-01

## 2024-12-01 RX ORDER — METRONIDAZOLE 500 MG/1
250 TABLET ORAL EVERY 8 HOURS
Refills: 0 | Status: COMPLETED | OUTPATIENT
Start: 2024-12-01 | End: 2024-12-02

## 2024-12-01 RX ORDER — ENOXAPARIN SODIUM 30 MG/.3ML
40 INJECTION SUBCUTANEOUS EVERY 12 HOURS
Refills: 0 | Status: DISCONTINUED | OUTPATIENT
Start: 2024-12-01 | End: 2024-12-09

## 2024-12-01 RX ORDER — ACETAMINOPHEN 500MG 500 MG/1
975 TABLET, COATED ORAL ONCE
Refills: 0 | Status: COMPLETED | OUTPATIENT
Start: 2024-12-01 | End: 2024-12-02

## 2024-12-01 RX ADMIN — FUROSEMIDE 40 MILLIGRAM(S): 40 TABLET ORAL at 13:08

## 2024-12-01 RX ADMIN — Medication 2 GRAM(S): at 13:06

## 2024-12-01 RX ADMIN — Medication 500 MILLIGRAM(S): at 13:05

## 2024-12-01 RX ADMIN — POLYETHYLENE GLYCOL 3350 17 GRAM(S): 17 POWDER, FOR SOLUTION ORAL at 14:48

## 2024-12-01 RX ADMIN — POLYETHYLENE GLYCOL 3350 17 GRAM(S): 17 POWDER, FOR SOLUTION ORAL at 15:57

## 2024-12-01 RX ADMIN — METRONIDAZOLE 250 MILLIGRAM(S): 500 TABLET ORAL at 20:07

## 2024-12-01 RX ADMIN — METRONIDAZOLE 250 MILLIGRAM(S): 500 TABLET ORAL at 13:21

## 2024-12-01 RX ADMIN — POLYETHYLENE GLYCOL 3350 17 GRAM(S): 17 POWDER, FOR SOLUTION ORAL at 17:02

## 2024-12-01 RX ADMIN — Medication 2000 UNIT(S): at 13:06

## 2024-12-01 RX ADMIN — ENOXAPARIN SODIUM 40 MILLIGRAM(S): 30 INJECTION SUBCUTANEOUS at 17:02

## 2024-12-01 RX ADMIN — NEOMYCIN SULFATE 500 MILLIGRAM(S): 500 TABLET ORAL at 13:04

## 2024-12-01 RX ADMIN — NEOMYCIN SULFATE 500 MILLIGRAM(S): 500 TABLET ORAL at 20:07

## 2024-12-01 RX ADMIN — POLYETHYLENE GLYCOL 3350 17 GRAM(S): 17 POWDER, FOR SOLUTION ORAL at 13:04

## 2024-12-01 RX ADMIN — POLYETHYLENE GLYCOL 3350 17 GRAM(S): 17 POWDER, FOR SOLUTION ORAL at 20:08

## 2024-12-01 RX ADMIN — POLYETHYLENE GLYCOL 3350 17 GRAM(S): 17 POWDER, FOR SOLUTION ORAL at 22:11

## 2024-12-01 NOTE — PATIENT PROFILE ADULT - IS THERE A SUSPICION OF ABUSE/NEGLIGENCE?
Verbal shift change report given to Doristine Sandhoff, RN  (oncoming nurse) by Nissa Robison RN (offgoing nurse). Report included the following information SBAR, Kardex, ED Summary, MAR, and Recent Results. no

## 2024-12-01 NOTE — H&P ADULT - NSICDXPASTMEDICALHX_GEN_ALL_CORE_FT
PAST MEDICAL HISTORY:  Acne Cystica (ICD9 706.1)     Anxiety     CHF (Congestive Heart Failure) (ICD9 428.0)     Endometrial cancer     Hypertension     Mass of colon     Obesity (ICD9 278.00)     DENIS (Obstructive Sleep Apnea) (ICD9 327.23)     Peripheral arterial disease     Sciatica     systemic edema

## 2024-12-01 NOTE — H&P ADULT - HISTORY OF PRESENT ILLNESS
This is a 89F with PMHx HTN, PAD (LE Stent), hypothyroidism, cataracts s/p surgery (), s/p  (), endometrial cancer s/p hysterectomy (), lung metastases s/p L upper lobectomy (, Dr. Lee Cantor), and cecal mass not amenable to endoscopic resection, now planned for robotic right hemicolectomy tomorrow with Dr. Sue.     Patient underwent CT scan for anemia, which showed a cecal mass. Patient  underwent Colonoscopy on 24 which showed a 4cm lesion by the ileocecal valve, with biopsy results c/w tubulovillous adenoma. Patient underwent repeat colonoscopy on 10/08/24 for attempted resection, however due to size, the mass was unable to be resected and was subsequently biopsied and tattooed by the GI team. Patient was subsequently referred to surgery for further management.     Patient seen and examined at bedside. Denies pain, denies nausea, denies vomiting

## 2024-12-01 NOTE — H&P ADULT - NSHPPHYSICALEXAM_GEN_ALL_CORE
General- NAD, resting comfortably  Neuro- A&Ox3, no focal deficits   HEENT- sunglasses in place, NC/AT  Pulm- no increased WOB on RA  Cards- Regular rate  Abd- soft, nontender, nondistended, large ventral hernia to the left of midline  Ext- WWP, +2 pedal edema  MSK- moving all extremities   Psych- appropriate, cooperative

## 2024-12-01 NOTE — PATIENT PROFILE ADULT - FALL HARM RISK - HARM RISK INTERVENTIONS

## 2024-12-01 NOTE — H&P ADULT - ASSESSMENT
This is a 89F with PMHx HTN, PAD (LE Stent), hypothyroidism, cataracts s/p surgery (2021),  endometrial cancer s/p hysterectomy (2009), lung metastases s/p L upper lobectomy (2016, Dr. Lee Cantor), and cecal mass not amenable to endoscopic resection, now planned for robotic right hemicolectomy tomorrow with Dr. Sue.     Plan:   - ERP  - Lovenox for VTE ppx  - home Vitamin C, Vitamin D3, Lasix, Synthroid  - strict I&Os  - OOBAT    A Surgery   [id number]

## 2024-12-01 NOTE — PATIENT PROFILE ADULT - FUNCTIONAL ASSESSMENT - BASIC MOBILITY 6.
3-calculated by average/Not able to assess (calculate score using [facility] averaging method)

## 2024-12-01 NOTE — H&P ADULT - NSHPLABSRESULTS_GEN_ALL_CORE
CTAP 05/29/24:  IMPRESSION:  *  5 cm cecal mass at the level of the ileocecal valve suspicious for   neoplasm.  *  No evidence of small bowel obstruction or active bowel inflammation.  *  Left lower quadrant ventral hernia containing nonobstructed small   bowel.    Colonoscopy 10/8:  Impressions:    Large, hemicircumferential, malignant-appearing ileocecal mass as described  above. Poorly amenable to endoscopic resection given central non-lifting sign  and size. Biopsied. Tattooed distally.

## 2024-12-02 ENCOUNTER — APPOINTMENT (OUTPATIENT)
Dept: COLORECTAL SURGERY | Facility: HOSPITAL | Age: 89
End: 2024-12-02

## 2024-12-02 ENCOUNTER — RESULT REVIEW (OUTPATIENT)
Age: 89
End: 2024-12-02

## 2024-12-02 LAB
ANION GAP SERPL CALC-SCNC: 14 MMOL/L — SIGNIFICANT CHANGE UP (ref 7–14)
APTT BLD: 31.6 SEC — SIGNIFICANT CHANGE UP (ref 24.5–35.6)
BLD GP AB SCN SERPL QL: NEGATIVE — SIGNIFICANT CHANGE UP
BLD GP AB SCN SERPL QL: NEGATIVE — SIGNIFICANT CHANGE UP
BUN SERPL-MCNC: 11 MG/DL — SIGNIFICANT CHANGE UP (ref 7–23)
CALCIUM SERPL-MCNC: 9.4 MG/DL — SIGNIFICANT CHANGE UP (ref 8.4–10.5)
CHLORIDE SERPL-SCNC: 103 MMOL/L — SIGNIFICANT CHANGE UP (ref 98–107)
CO2 SERPL-SCNC: 23 MMOL/L — SIGNIFICANT CHANGE UP (ref 22–31)
CREAT SERPL-MCNC: 0.65 MG/DL — SIGNIFICANT CHANGE UP (ref 0.5–1.3)
EGFR: 85 ML/MIN/1.73M2 — SIGNIFICANT CHANGE UP
GLUCOSE BLDC GLUCOMTR-MCNC: 93 MG/DL — SIGNIFICANT CHANGE UP (ref 70–99)
GLUCOSE SERPL-MCNC: 109 MG/DL — HIGH (ref 70–99)
HCT VFR BLD CALC: 41.6 % — SIGNIFICANT CHANGE UP (ref 34.5–45)
HGB BLD-MCNC: 13.6 G/DL — SIGNIFICANT CHANGE UP (ref 11.5–15.5)
INR BLD: 0.95 RATIO — SIGNIFICANT CHANGE UP (ref 0.85–1.16)
MAGNESIUM SERPL-MCNC: 2.3 MG/DL — SIGNIFICANT CHANGE UP (ref 1.6–2.6)
MCHC RBC-ENTMCNC: 30.5 PG — SIGNIFICANT CHANGE UP (ref 27–34)
MCHC RBC-ENTMCNC: 32.7 G/DL — SIGNIFICANT CHANGE UP (ref 32–36)
MCV RBC AUTO: 93.3 FL — SIGNIFICANT CHANGE UP (ref 80–100)
NRBC # BLD: 0 /100 WBCS — SIGNIFICANT CHANGE UP (ref 0–0)
NRBC # FLD: 0 K/UL — SIGNIFICANT CHANGE UP (ref 0–0)
PHOSPHATE SERPL-MCNC: 3.2 MG/DL — SIGNIFICANT CHANGE UP (ref 2.5–4.5)
PLATELET # BLD AUTO: 242 K/UL — SIGNIFICANT CHANGE UP (ref 150–400)
POTASSIUM SERPL-MCNC: 4.1 MMOL/L — SIGNIFICANT CHANGE UP (ref 3.5–5.3)
POTASSIUM SERPL-SCNC: 4.1 MMOL/L — SIGNIFICANT CHANGE UP (ref 3.5–5.3)
PROTHROM AB SERPL-ACNC: 11.3 SEC — SIGNIFICANT CHANGE UP (ref 9.9–13.4)
RBC # BLD: 4.46 M/UL — SIGNIFICANT CHANGE UP (ref 3.8–5.2)
RBC # FLD: 13.2 % — SIGNIFICANT CHANGE UP (ref 10.3–14.5)
RH IG SCN BLD-IMP: POSITIVE — SIGNIFICANT CHANGE UP
RH IG SCN BLD-IMP: POSITIVE — SIGNIFICANT CHANGE UP
SODIUM SERPL-SCNC: 140 MMOL/L — SIGNIFICANT CHANGE UP (ref 135–145)
WBC # BLD: 6.06 K/UL — SIGNIFICANT CHANGE UP (ref 3.8–10.5)
WBC # FLD AUTO: 6.06 K/UL — SIGNIFICANT CHANGE UP (ref 3.8–10.5)

## 2024-12-02 PROCEDURE — 49591 RPR AA HRN 1ST < 3 CM RDC: CPT

## 2024-12-02 PROCEDURE — S2900 ROBOTIC SURGICAL SYSTEM: CPT | Mod: NC

## 2024-12-02 PROCEDURE — 88304 TISSUE EXAM BY PATHOLOGIST: CPT | Mod: 26

## 2024-12-02 PROCEDURE — 88309 TISSUE EXAM BY PATHOLOGIST: CPT | Mod: 26

## 2024-12-02 PROCEDURE — 44205 LAP COLECTOMY PART W/ILEUM: CPT | Mod: AS

## 2024-12-02 PROCEDURE — 44204 LAPARO PARTIAL COLECTOMY: CPT

## 2024-12-02 DEVICE — STAPLER COVIDIEN GIA 80-3.0MM PURPLE RELOAD: Type: IMPLANTABLE DEVICE | Status: FUNCTIONAL

## 2024-12-02 DEVICE — STAPLER COVIDIEN TRI-STAPLE 60MM PURPLE RELOAD: Type: IMPLANTABLE DEVICE | Status: FUNCTIONAL

## 2024-12-02 DEVICE — CLIP LIGATION TI ANGIO LG ORANGE: Type: IMPLANTABLE DEVICE | Status: FUNCTIONAL

## 2024-12-02 DEVICE — STAPLER COVIDIEN GIA 80-3.0MM PURPLE: Type: IMPLANTABLE DEVICE | Status: FUNCTIONAL

## 2024-12-02 DEVICE — LIGATING CLIPS WECK HORIZON MEDIUM (BLUE) 24: Type: IMPLANTABLE DEVICE | Status: FUNCTIONAL

## 2024-12-02 DEVICE — LIGATING CLIPS WECK HEMOLOK POLYMER LARGE (PURPLE) 6: Type: IMPLANTABLE DEVICE | Status: FUNCTIONAL

## 2024-12-02 RX ORDER — SIMETHICONE 125 MG
80 CAPSULE ORAL ONCE
Refills: 0 | Status: DISCONTINUED | OUTPATIENT
Start: 2024-12-02 | End: 2024-12-03

## 2024-12-02 RX ORDER — HYDROMORPHONE HYDROCHLORIDE 2 MG/1
0.5 TABLET ORAL
Refills: 0 | Status: DISCONTINUED | OUTPATIENT
Start: 2024-12-02 | End: 2024-12-02

## 2024-12-02 RX ORDER — 0.9 % SODIUM CHLORIDE 0.9 %
1000 INTRAVENOUS SOLUTION INTRAVENOUS
Refills: 0 | Status: DISCONTINUED | OUTPATIENT
Start: 2024-12-02 | End: 2024-12-03

## 2024-12-02 RX ORDER — HYDROMORPHONE HYDROCHLORIDE 2 MG/1
0.25 TABLET ORAL
Refills: 0 | Status: DISCONTINUED | OUTPATIENT
Start: 2024-12-02 | End: 2024-12-03

## 2024-12-02 RX ORDER — ACETAMINOPHEN 500MG 500 MG/1
1000 TABLET, COATED ORAL EVERY 6 HOURS
Refills: 0 | Status: DISCONTINUED | OUTPATIENT
Start: 2024-12-02 | End: 2024-12-03

## 2024-12-02 RX ORDER — HYDROMORPHONE HYDROCHLORIDE 2 MG/1
0.25 TABLET ORAL
Refills: 0 | Status: DISCONTINUED | OUTPATIENT
Start: 2024-12-02 | End: 2024-12-02

## 2024-12-02 RX ORDER — OXYCODONE HYDROCHLORIDE 30 MG/1
5 TABLET ORAL ONCE
Refills: 0 | Status: DISCONTINUED | OUTPATIENT
Start: 2024-12-02 | End: 2024-12-02

## 2024-12-02 RX ORDER — ONDANSETRON HYDROCHLORIDE 4 MG/1
4 TABLET, FILM COATED ORAL ONCE
Refills: 0 | Status: DISCONTINUED | OUTPATIENT
Start: 2024-12-02 | End: 2024-12-02

## 2024-12-02 RX ORDER — ONDANSETRON HYDROCHLORIDE 4 MG/1
4 TABLET, FILM COATED ORAL EVERY 8 HOURS
Refills: 0 | Status: DISCONTINUED | OUTPATIENT
Start: 2024-12-02 | End: 2024-12-09

## 2024-12-02 RX ADMIN — Medication 50 MICROGRAM(S): at 06:00

## 2024-12-02 RX ADMIN — POLYETHYLENE GLYCOL 3350 17 GRAM(S): 17 POWDER, FOR SOLUTION ORAL at 02:26

## 2024-12-02 RX ADMIN — METRONIDAZOLE 250 MILLIGRAM(S): 500 TABLET ORAL at 04:07

## 2024-12-02 RX ADMIN — HYDROMORPHONE HYDROCHLORIDE 0.25 MILLIGRAM(S): 2 TABLET ORAL at 21:45

## 2024-12-02 RX ADMIN — POLYETHYLENE GLYCOL 3350 17 GRAM(S): 17 POWDER, FOR SOLUTION ORAL at 00:43

## 2024-12-02 RX ADMIN — ONDANSETRON HYDROCHLORIDE 4 MILLIGRAM(S): 4 TABLET, FILM COATED ORAL at 08:13

## 2024-12-02 RX ADMIN — HYDROMORPHONE HYDROCHLORIDE 0.25 MILLIGRAM(S): 2 TABLET ORAL at 21:11

## 2024-12-02 RX ADMIN — FUROSEMIDE 40 MILLIGRAM(S): 40 TABLET ORAL at 06:02

## 2024-12-02 RX ADMIN — NEOMYCIN SULFATE 500 MILLIGRAM(S): 500 TABLET ORAL at 04:07

## 2024-12-02 RX ADMIN — ONDANSETRON HYDROCHLORIDE 4 MILLIGRAM(S): 4 TABLET, FILM COATED ORAL at 23:37

## 2024-12-02 RX ADMIN — CHLORHEXIDINE GLUCONATE 1 APPLICATION(S): 1.2 RINSE ORAL at 09:54

## 2024-12-02 RX ADMIN — Medication 120 MILLILITER(S): at 08:11

## 2024-12-02 RX ADMIN — HYDROMORPHONE HYDROCHLORIDE 0.25 MILLIGRAM(S): 2 TABLET ORAL at 21:31

## 2024-12-02 RX ADMIN — ACETAMINOPHEN 500MG 975 MILLIGRAM(S): 500 TABLET, COATED ORAL at 12:55

## 2024-12-02 RX ADMIN — HYDROMORPHONE HYDROCHLORIDE 0.25 MILLIGRAM(S): 2 TABLET ORAL at 21:33

## 2024-12-02 RX ADMIN — SODIUM CHLORIDE 3 MILLILITER(S): 9 INJECTION, SOLUTION INTRAMUSCULAR; INTRAVENOUS; SUBCUTANEOUS at 22:20

## 2024-12-02 RX ADMIN — HYDROMORPHONE HYDROCHLORIDE 0.25 MILLIGRAM(S): 2 TABLET ORAL at 21:21

## 2024-12-02 RX ADMIN — ENOXAPARIN SODIUM 40 MILLIGRAM(S): 30 INJECTION SUBCUTANEOUS at 06:00

## 2024-12-02 NOTE — PRE-OP CHECKLIST - SELECT TESTS ORDERED
93/BMP/CBC/PT/PTT/INR/Type and Screen/Results in MD note/POCT Blood Glucose FS 93 @ 12:53am/BMP/CBC/PT/PTT/INR/Type and Screen/Results in MD note/POCT Blood Glucose

## 2024-12-02 NOTE — CHART NOTE - NSCHARTNOTEFT_GEN_A_CORE
General Surgery Post-Op Check    Patient seen and examined. Pain is controlled. Denies SOB/CP/V. Complaining of some nausea.     Vital Signs Last 24 Hrs  T(C): 36.1 (02 Dec 2024 20:05), Max: 36.8 (02 Dec 2024 04:20)  T(F): 97 (02 Dec 2024 20:05), Max: 98.2 (02 Dec 2024 04:20)  HR: 77 (02 Dec 2024 22:00) (60 - 77)  BP: 152/64 (02 Dec 2024 22:00) (123/57 - 175/85)  BP(mean): 87 (02 Dec 2024 22:00) (87 - 109)  RR: 15 (02 Dec 2024 22:00) (12 - 18)  SpO2: 96% (02 Dec 2024 22:00) (94% - 100%)    Parameters below as of 02 Dec 2024 22:00  Patient On (Oxygen Delivery Method): room air        I&O's Summary    01 Dec 2024 07:01  -  02 Dec 2024 07:00  --------------------------------------------------------  IN: 1440 mL / OUT: 600 mL / NET: 840 mL    02 Dec 2024 07:01  -  02 Dec 2024 23:30  --------------------------------------------------------  IN: 30 mL / OUT: 1320 mL / NET: -1290 mL        Physical Exam  Gen: NAD, AAOx3  Pulm: No respiratory distress, NC  CV: RRR, no JVD  Abd: Obese, Soft, Tender to palpation in LLQ, Slightly distended, Incisions covered with dry dressings.   Drains: ALLA x 1 draining SS output.       A/P: 89y Female s/p robotic lap assisted R hemicolectomy & ventral hernia repair.   -DVT prophylaxis w/ SCD & lovenox.   -Monitor I&O's  -Monitor ALLA drain output  -Analgesia and antiemetics as needed  -CLD  -IVF  -Monitor overnight    A Team Surgery   [id number]

## 2024-12-02 NOTE — BRIEF OPERATIVE NOTE - OPERATION/FINDINGS
robotic asst lap R hemicolectomy for cecal mass  upon entry for midline, ventral hernia w 5 cm defect - small bowel reduced, all viable, hernia sac amputated  mobilization past hepatic flexure using robot of right colon  AZIZA 80 mm purple x 2 used to staple R colon and 20 cm of TI  side to side anastmosis using Endo AZIZA purple load, common enterotomy closed primarily, hand-sewn  midline incision and prior hernia defect closed with looped 1-0 PDS  19 Fr ankit drain left in subcutaneous space

## 2024-12-02 NOTE — PATIENT PROFILE ADULT - NSPROPTRIGHTSUPPORTPERSON_GEN_A_NUR
Medication:    Name from pharmacy: Myrbetriq 25 MG Oral Tablet Extended Release 24 Hour         Will file in chart as: Myrbetriq 25 MG 24 hour tablet    Sig: TAKE 1 TABLET BY MOUTH DAILY    Disp: 90 tablet    Refills: 3    Start: 12/1/2024    Class: Eprescribe    Non-formulary For: OAB (overactive bladder)    To pharmacy: Please send a replace/new response with 100-Day Supply if appropriate to maximize member benefit. Requesting 1 year supply.    Last ordered: 3 months ago (8/15/2024) by ERASMO Ledesma    Last refill: 9/30/2024    Rx #: [id number]    Urinary Beta-3 Adrenergic Agonists Refill Protocol - 12 Month Protocol Ngezup1912/01/2024 03:32 AM   Protocol Details Seen by prescribing provider or same department within the last 12 months or has a future appt in 3 months - IF FAILED PLEASE LOOK AT CHART REVIEW FOR LAST VISIT AND PROCEED ACCORDINGLY    Medication (including dose and sig) on current meds list    GFR greater than 9 within last 12 months looking at last value -- IF CRITERIA FAILED REFER TO PROTOCOL DETAILS       passed protocol.     Patient is being seen for med check tomorrow.   
yes

## 2024-12-02 NOTE — BRIEF OPERATIVE NOTE - COMMENTS
I, Merle Hutchins PA-C, served as the first assistant in this operation. I assisted in placing ports, docking, and targeting the da Antonette robot, first assisted at the surgical field while the surgeon was performing the operation at the robotic console by providing instrument exchanges, tissue retraction, suction and irrigation, specimen retrieval, passing and removing sutures and sponges, undocking the robotic platform, and closed surgical wounds.     Please match surgeon's billing codes

## 2024-12-02 NOTE — BRIEF OPERATIVE NOTE - NSICDXBRIEFPROCEDURE_GEN_ALL_CORE_FT
PROCEDURES:  Right hemicolectomy 02-Dec-2024 19:28:44  Karen Penaloza  Repair, hernia, ventral, robot-assisted, using component separation technique 02-Dec-2024 19:29:49  Karen Penaloza

## 2024-12-03 LAB
ANION GAP SERPL CALC-SCNC: 16 MMOL/L — HIGH (ref 7–14)
BUN SERPL-MCNC: 8 MG/DL — SIGNIFICANT CHANGE UP (ref 7–23)
CALCIUM SERPL-MCNC: 8.4 MG/DL — SIGNIFICANT CHANGE UP (ref 8.4–10.5)
CHLORIDE SERPL-SCNC: 100 MMOL/L — SIGNIFICANT CHANGE UP (ref 98–107)
CO2 SERPL-SCNC: 20 MMOL/L — LOW (ref 22–31)
CREAT SERPL-MCNC: 0.59 MG/DL — SIGNIFICANT CHANGE UP (ref 0.5–1.3)
EGFR: 87 ML/MIN/1.73M2 — SIGNIFICANT CHANGE UP
GLUCOSE SERPL-MCNC: 180 MG/DL — HIGH (ref 70–99)
HCT VFR BLD CALC: 42.3 % — SIGNIFICANT CHANGE UP (ref 34.5–45)
HGB BLD-MCNC: 13.7 G/DL — SIGNIFICANT CHANGE UP (ref 11.5–15.5)
MAGNESIUM SERPL-MCNC: 1.9 MG/DL — SIGNIFICANT CHANGE UP (ref 1.6–2.6)
MCHC RBC-ENTMCNC: 30.2 PG — SIGNIFICANT CHANGE UP (ref 27–34)
MCHC RBC-ENTMCNC: 32.4 G/DL — SIGNIFICANT CHANGE UP (ref 32–36)
MCV RBC AUTO: 93.2 FL — SIGNIFICANT CHANGE UP (ref 80–100)
NRBC # BLD: 0 /100 WBCS — SIGNIFICANT CHANGE UP (ref 0–0)
NRBC # FLD: 0 K/UL — SIGNIFICANT CHANGE UP (ref 0–0)
PHOSPHATE SERPL-MCNC: 3.3 MG/DL — SIGNIFICANT CHANGE UP (ref 2.5–4.5)
PLATELET # BLD AUTO: 155 K/UL — SIGNIFICANT CHANGE UP (ref 150–400)
POTASSIUM SERPL-MCNC: 4.1 MMOL/L — SIGNIFICANT CHANGE UP (ref 3.5–5.3)
POTASSIUM SERPL-SCNC: 4.1 MMOL/L — SIGNIFICANT CHANGE UP (ref 3.5–5.3)
RBC # BLD: 4.54 M/UL — SIGNIFICANT CHANGE UP (ref 3.8–5.2)
RBC # FLD: 13.1 % — SIGNIFICANT CHANGE UP (ref 10.3–14.5)
SODIUM SERPL-SCNC: 136 MMOL/L — SIGNIFICANT CHANGE UP (ref 135–145)
WBC # BLD: 13.74 K/UL — HIGH (ref 3.8–10.5)
WBC # FLD AUTO: 13.74 K/UL — HIGH (ref 3.8–10.5)

## 2024-12-03 PROCEDURE — 93010 ELECTROCARDIOGRAM REPORT: CPT

## 2024-12-03 RX ORDER — HYDROMORPHONE HYDROCHLORIDE 2 MG/1
0.2 TABLET ORAL EVERY 4 HOURS
Refills: 0 | Status: DISCONTINUED | OUTPATIENT
Start: 2024-12-03 | End: 2024-12-04

## 2024-12-03 RX ORDER — ACETAMINOPHEN 500MG 500 MG/1
975 TABLET, COATED ORAL EVERY 6 HOURS
Refills: 0 | Status: DISCONTINUED | OUTPATIENT
Start: 2024-12-03 | End: 2024-12-09

## 2024-12-03 RX ADMIN — ACETAMINOPHEN 500MG 975 MILLIGRAM(S): 500 TABLET, COATED ORAL at 18:02

## 2024-12-03 RX ADMIN — Medication 2000 UNIT(S): at 12:38

## 2024-12-03 RX ADMIN — ACETAMINOPHEN 500MG 400 MILLIGRAM(S): 500 TABLET, COATED ORAL at 05:17

## 2024-12-03 RX ADMIN — Medication 50 MICROGRAM(S): at 05:08

## 2024-12-03 RX ADMIN — SODIUM CHLORIDE 3 MILLILITER(S): 9 INJECTION, SOLUTION INTRAMUSCULAR; INTRAVENOUS; SUBCUTANEOUS at 06:00

## 2024-12-03 RX ADMIN — ACETAMINOPHEN 500MG 400 MILLIGRAM(S): 500 TABLET, COATED ORAL at 00:10

## 2024-12-03 RX ADMIN — SODIUM CHLORIDE 3 MILLILITER(S): 9 INJECTION, SOLUTION INTRAMUSCULAR; INTRAVENOUS; SUBCUTANEOUS at 22:20

## 2024-12-03 RX ADMIN — HYDROMORPHONE HYDROCHLORIDE 0.2 MILLIGRAM(S): 2 TABLET ORAL at 15:24

## 2024-12-03 RX ADMIN — CHLORHEXIDINE GLUCONATE 1 APPLICATION(S): 1.2 RINSE ORAL at 12:46

## 2024-12-03 RX ADMIN — Medication 500 MILLIGRAM(S): at 12:38

## 2024-12-03 RX ADMIN — SODIUM CHLORIDE 3 MILLILITER(S): 9 INJECTION, SOLUTION INTRAMUSCULAR; INTRAVENOUS; SUBCUTANEOUS at 13:28

## 2024-12-03 RX ADMIN — ACETAMINOPHEN 500MG 975 MILLIGRAM(S): 500 TABLET, COATED ORAL at 12:42

## 2024-12-03 RX ADMIN — ENOXAPARIN SODIUM 40 MILLIGRAM(S): 30 INJECTION SUBCUTANEOUS at 18:02

## 2024-12-03 RX ADMIN — ACETAMINOPHEN 500MG 1000 MILLIGRAM(S): 500 TABLET, COATED ORAL at 06:00

## 2024-12-03 RX ADMIN — ENOXAPARIN SODIUM 40 MILLIGRAM(S): 30 INJECTION SUBCUTANEOUS at 05:16

## 2024-12-03 RX ADMIN — Medication 2 GRAM(S): at 10:43

## 2024-12-03 RX ADMIN — ACETAMINOPHEN 500MG 1000 MILLIGRAM(S): 500 TABLET, COATED ORAL at 01:10

## 2024-12-03 RX ADMIN — Medication 1000 MILLIGRAM(S): at 18:00

## 2024-12-04 LAB
ANION GAP SERPL CALC-SCNC: 10 MMOL/L — SIGNIFICANT CHANGE UP (ref 7–14)
ANION GAP SERPL CALC-SCNC: 12 MMOL/L — SIGNIFICANT CHANGE UP (ref 7–14)
BUN SERPL-MCNC: 11 MG/DL — SIGNIFICANT CHANGE UP (ref 7–23)
BUN SERPL-MCNC: 9 MG/DL — SIGNIFICANT CHANGE UP (ref 7–23)
CALCIUM SERPL-MCNC: 8.5 MG/DL — SIGNIFICANT CHANGE UP (ref 8.4–10.5)
CALCIUM SERPL-MCNC: 8.7 MG/DL — SIGNIFICANT CHANGE UP (ref 8.4–10.5)
CHLORIDE SERPL-SCNC: 103 MMOL/L — SIGNIFICANT CHANGE UP (ref 98–107)
CHLORIDE SERPL-SCNC: 104 MMOL/L — SIGNIFICANT CHANGE UP (ref 98–107)
CO2 SERPL-SCNC: 25 MMOL/L — SIGNIFICANT CHANGE UP (ref 22–31)
CO2 SERPL-SCNC: 27 MMOL/L — SIGNIFICANT CHANGE UP (ref 22–31)
CREAT SERPL-MCNC: 0.79 MG/DL — SIGNIFICANT CHANGE UP (ref 0.5–1.3)
CREAT SERPL-MCNC: 0.8 MG/DL — SIGNIFICANT CHANGE UP (ref 0.5–1.3)
EGFR: 71 ML/MIN/1.73M2 — SIGNIFICANT CHANGE UP
EGFR: 72 ML/MIN/1.73M2 — SIGNIFICANT CHANGE UP
GLUCOSE SERPL-MCNC: 157 MG/DL — HIGH (ref 70–99)
GLUCOSE SERPL-MCNC: 278 MG/DL — HIGH (ref 70–99)
HCT VFR BLD CALC: 35.1 % — SIGNIFICANT CHANGE UP (ref 34.5–45)
HCT VFR BLD CALC: 35.5 % — SIGNIFICANT CHANGE UP (ref 34.5–45)
HGB BLD-MCNC: 11 G/DL — LOW (ref 11.5–15.5)
HGB BLD-MCNC: 11 G/DL — LOW (ref 11.5–15.5)
MAGNESIUM SERPL-MCNC: 2.1 MG/DL — SIGNIFICANT CHANGE UP (ref 1.6–2.6)
MAGNESIUM SERPL-MCNC: 2.3 MG/DL — SIGNIFICANT CHANGE UP (ref 1.6–2.6)
MCHC RBC-ENTMCNC: 29.2 PG — SIGNIFICANT CHANGE UP (ref 27–34)
MCHC RBC-ENTMCNC: 29.6 PG — SIGNIFICANT CHANGE UP (ref 27–34)
MCHC RBC-ENTMCNC: 31 G/DL — LOW (ref 32–36)
MCHC RBC-ENTMCNC: 31.3 G/DL — LOW (ref 32–36)
MCV RBC AUTO: 94.2 FL — SIGNIFICANT CHANGE UP (ref 80–100)
MCV RBC AUTO: 94.4 FL — SIGNIFICANT CHANGE UP (ref 80–100)
NRBC # BLD: 0 /100 WBCS — SIGNIFICANT CHANGE UP (ref 0–0)
NRBC # BLD: 0 /100 WBCS — SIGNIFICANT CHANGE UP (ref 0–0)
NRBC # FLD: 0 K/UL — SIGNIFICANT CHANGE UP (ref 0–0)
NRBC # FLD: 0 K/UL — SIGNIFICANT CHANGE UP (ref 0–0)
PHOSPHATE SERPL-MCNC: 1.7 MG/DL — LOW (ref 2.5–4.5)
PHOSPHATE SERPL-MCNC: 3 MG/DL — SIGNIFICANT CHANGE UP (ref 2.5–4.5)
PLATELET # BLD AUTO: 218 K/UL — SIGNIFICANT CHANGE UP (ref 150–400)
PLATELET # BLD AUTO: 230 K/UL — SIGNIFICANT CHANGE UP (ref 150–400)
POTASSIUM SERPL-MCNC: 3.3 MMOL/L — LOW (ref 3.5–5.3)
POTASSIUM SERPL-MCNC: 3.6 MMOL/L — SIGNIFICANT CHANGE UP (ref 3.5–5.3)
POTASSIUM SERPL-SCNC: 3.3 MMOL/L — LOW (ref 3.5–5.3)
POTASSIUM SERPL-SCNC: 3.6 MMOL/L — SIGNIFICANT CHANGE UP (ref 3.5–5.3)
RBC # BLD: 3.72 M/UL — LOW (ref 3.8–5.2)
RBC # BLD: 3.77 M/UL — LOW (ref 3.8–5.2)
RBC # FLD: 13.8 % — SIGNIFICANT CHANGE UP (ref 10.3–14.5)
RBC # FLD: 13.9 % — SIGNIFICANT CHANGE UP (ref 10.3–14.5)
SODIUM SERPL-SCNC: 140 MMOL/L — SIGNIFICANT CHANGE UP (ref 135–145)
SODIUM SERPL-SCNC: 141 MMOL/L — SIGNIFICANT CHANGE UP (ref 135–145)
WBC # BLD: 11.79 K/UL — HIGH (ref 3.8–10.5)
WBC # BLD: 13.68 K/UL — HIGH (ref 3.8–10.5)
WBC # FLD AUTO: 11.79 K/UL — HIGH (ref 3.8–10.5)
WBC # FLD AUTO: 13.68 K/UL — HIGH (ref 3.8–10.5)

## 2024-12-04 RX ORDER — POTASSIUM PHOSPHATE, MONOBASIC POTASSIUM PHOSPHATE, DIBASIC INJECTION, 236; 224 MG/ML; MG/ML
30 SOLUTION, CONCENTRATE INTRAVENOUS ONCE
Refills: 0 | Status: COMPLETED | OUTPATIENT
Start: 2024-12-04 | End: 2024-12-04

## 2024-12-04 RX ORDER — SODIUM,POTASSIUM PHOSPHATES 278-250MG
1 POWDER IN PACKET (EA) ORAL
Refills: 0 | Status: COMPLETED | OUTPATIENT
Start: 2024-12-04 | End: 2024-12-04

## 2024-12-04 RX ORDER — POTASSIUM CHLORIDE 600 MG/1
40 TABLET, EXTENDED RELEASE ORAL ONCE
Refills: 0 | Status: COMPLETED | OUTPATIENT
Start: 2024-12-04 | End: 2024-12-04

## 2024-12-04 RX ADMIN — Medication 50 MICROGRAM(S): at 07:02

## 2024-12-04 RX ADMIN — ACETAMINOPHEN 500MG 975 MILLIGRAM(S): 500 TABLET, COATED ORAL at 18:50

## 2024-12-04 RX ADMIN — ENOXAPARIN SODIUM 40 MILLIGRAM(S): 30 INJECTION SUBCUTANEOUS at 07:02

## 2024-12-04 RX ADMIN — Medication 1 TABLET(S): at 17:48

## 2024-12-04 RX ADMIN — POTASSIUM CHLORIDE 40 MILLIEQUIVALENT(S): 600 TABLET, EXTENDED RELEASE ORAL at 12:25

## 2024-12-04 RX ADMIN — Medication 2 GRAM(S): at 12:25

## 2024-12-04 RX ADMIN — Medication 1000 MILLIGRAM(S): at 07:02

## 2024-12-04 RX ADMIN — SODIUM CHLORIDE 3 MILLILITER(S): 9 INJECTION, SOLUTION INTRAMUSCULAR; INTRAVENOUS; SUBCUTANEOUS at 14:00

## 2024-12-04 RX ADMIN — SODIUM CHLORIDE 3 MILLILITER(S): 9 INJECTION, SOLUTION INTRAMUSCULAR; INTRAVENOUS; SUBCUTANEOUS at 22:23

## 2024-12-04 RX ADMIN — ACETAMINOPHEN 500MG 975 MILLIGRAM(S): 500 TABLET, COATED ORAL at 07:02

## 2024-12-04 RX ADMIN — Medication 1000 MILLIGRAM(S): at 17:48

## 2024-12-04 RX ADMIN — ACETAMINOPHEN 500MG 975 MILLIGRAM(S): 500 TABLET, COATED ORAL at 17:50

## 2024-12-04 RX ADMIN — Medication 1 TABLET(S): at 12:25

## 2024-12-04 RX ADMIN — ACETAMINOPHEN 500MG 975 MILLIGRAM(S): 500 TABLET, COATED ORAL at 13:26

## 2024-12-04 RX ADMIN — ACETAMINOPHEN 500MG 975 MILLIGRAM(S): 500 TABLET, COATED ORAL at 12:26

## 2024-12-04 RX ADMIN — Medication 2000 UNIT(S): at 12:27

## 2024-12-04 RX ADMIN — CHLORHEXIDINE GLUCONATE 1 APPLICATION(S): 1.2 RINSE ORAL at 12:27

## 2024-12-04 RX ADMIN — ENOXAPARIN SODIUM 40 MILLIGRAM(S): 30 INJECTION SUBCUTANEOUS at 17:49

## 2024-12-04 RX ADMIN — POTASSIUM PHOSPHATE, MONOBASIC POTASSIUM PHOSPHATE, DIBASIC INJECTION, 83.33 MILLIMOLE(S): 236; 224 SOLUTION, CONCENTRATE INTRAVENOUS at 12:24

## 2024-12-04 RX ADMIN — Medication 500 MILLIGRAM(S): at 12:27

## 2024-12-04 NOTE — DISCHARGE NOTE PROVIDER - INSTRUCTIONS
If you had part of your intestine removed, please consume a low fiber diet for the first 2-3 weeks. You should avoid raw fruits/vegetables, nuts, seeds, corn, and leafy greens (spinach, kale, noelle greens, lettuce) during this period of time.

## 2024-12-04 NOTE — PHYSICAL THERAPY INITIAL EVALUATION ADULT - DID THE PATIENT HAVE SURGERY?
Right hemicolectomy, Repair, hernia, ventral, robot-assisted, using component separation technique/yes

## 2024-12-04 NOTE — DISCHARGE NOTE PROVIDER - CARE PROVIDER_API CALL
Keo Sue  Surgery  [address] [place] [address], [address]  [place] [address]  Phone: [phone]  Fax: [phone]  Follow Up Time: 1 week

## 2024-12-04 NOTE — PHYSICAL THERAPY INITIAL EVALUATION ADULT - ADDITIONAL COMMENTS
Patient lives alone in apartment, + elevator access. Patient has an aide 5 hours/5 days week. Patient was using a walker for ambulation prior to admission.

## 2024-12-04 NOTE — DISCHARGE NOTE PROVIDER - NSDCMRMEDTOKEN_GEN_ALL_CORE_FT
furosemide: orally prn 40 - 80 mg based on pedal edema  levothyroxine 50 mcg (0.05 mg) oral tablet: 1 tab(s) orally once a day am  omega-3 polyunsaturated fatty acids: 1 cap(s) orally once a day Last dose 11/26/24  Vitamin C, 500 mg, Po, Daily:   Vitamin D3, 12,000 units, PO, Daily:    acetaminophen 325 mg oral tablet: 3 tab(s) orally every 6 hours  furosemide: orally prn 40 - 80 mg based on pedal edema  HYDROmorphone 2 mg oral tablet: 1 tab(s) orally every 4 hours As needed Moderate Pain (4 - 6) severe pain (7-10)  levothyroxine 50 mcg (0.05 mg) oral tablet: 1 tab(s) orally once a day am  omega-3 polyunsaturated fatty acids: 1 cap(s) orally once a day Last dose 11/26/24  Vitamin C, 500 mg, Po, Daily:   Vitamin D3, 12,000 units, PO, Daily:

## 2024-12-04 NOTE — DISCHARGE NOTE PROVIDER - NSDCFUADDINST_GEN_ALL_CORE_FT
WOUND CARE:  Please keep incisions clean and dry. Please do not Scrub or rub incisions. Do not use lotion or powder on incisions.   BATHING: You may shower and/or sponge bathe. You may use warm soapy water in the shower and rinse, pat dry.  ACTIVITY: No heavy lifting or straining. Otherwise, you may return to your usual level of physical activity. If you are taking narcotic pain medication DO NOT drive a car, operate machinery or make important decisions.  DIET: Return to your usual diet.  NOTIFY YOUR SURGEON IF YOU HAVE: any bleeding that does not stop, any pus draining from your wound(s), any fever (over 100.4 F) persistent nausea/vomiting, or if your pain is not controlled on your discharge pain medications, unable to urinate.  Please follow up with your primary care physician in one week regarding your hospitalization, bring copies of your discharge paperwork.  Please follow up with your surgeon, Dr. Sue. Call [phone] to make an appointment.

## 2024-12-04 NOTE — PHYSICAL THERAPY INITIAL EVALUATION ADULT - PERTINENT HX OF CURRENT PROBLEM, REHAB EVAL
89 yo female Endometrial cancer status post Hysterectomy,  partial lobectomy  for lung cancer. Patient was noted to be anemic. A CT scan performed showed possible cecal mass. She underwent a colonoscopy and the mass was identified biopsied and returned as tubulovillous adenoma. She was referred to advanced endoscopy who also biopsied the lesion, Pt went for evaluation of  cecal polyp by surgeon and Now present in Presurgical testing for preop evaluation for scheduled procedure laparoscopic/robotic right hemicolectomy

## 2024-12-04 NOTE — DISCHARGE NOTE PROVIDER - NSDCDCMDCOMP_GEN_ALL_CORE
This document is complete and the patient is ready for discharge. Quality 130: Documentation Of Current Medications In The Medical Record: Current Medications Documented Detail Level: Detailed Quality 226: Preventive Care And Screening: Tobacco Use: Screening And Cessation Intervention: Patient screened for tobacco use and is an ex/non-smoker

## 2024-12-04 NOTE — DISCHARGE NOTE PROVIDER - HOSPITAL COURSE
89 year old female presents s/p robotic assisted laparoscopic R hemicolectomy and ventral hernia repair for cecal mass on 12/2. Patient tolerated operation well and there were no post-operative complications identified. Patient remained hemodynamically stable in the PACU and transferred to the surgical floor. Diet was restarted and advanced as tolerated. Pain control was transitioned from IV to PO pain meds. At this time, patient is currently ambulating, voiding, tolerating a regular diet. Patient has been deemed stable for discharge _______ with follow up as an outpatient. 89 year old female presents s/p robotic assisted laparoscopic R hemicolectomy and ventral hernia repair for cecal mass on 12/2. Patient tolerated operation well and there were no post-operative complications identified. Patient remained hemodynamically stable in the PACU and transferred to the surgical floor. PT recommending rehab placement. Diet was restarted and advanced as tolerated. Pain control was transitioned from IV to PO pain meds. At this time, patient is currently ambulating with assistance, voiding, tolerating a regular diet. Patient has been deemed stable for discharge rehab with follow up as an outpatient.

## 2024-12-04 NOTE — DISCHARGE NOTE PROVIDER - NSDCCPTREATMENT_GEN_ALL_CORE_FT
PRINCIPAL PROCEDURE  Procedure: Right hemicolectomy  Findings and Treatment:       SECONDARY PROCEDURE  Procedure: Repair, hernia, ventral, robot-assisted, using component separation technique  Findings and Treatment:

## 2024-12-05 LAB
ANION GAP SERPL CALC-SCNC: 11 MMOL/L — SIGNIFICANT CHANGE UP (ref 7–14)
BUN SERPL-MCNC: 8 MG/DL — SIGNIFICANT CHANGE UP (ref 7–23)
CALCIUM SERPL-MCNC: 8.7 MG/DL — SIGNIFICANT CHANGE UP (ref 8.4–10.5)
CHLORIDE SERPL-SCNC: 106 MMOL/L — SIGNIFICANT CHANGE UP (ref 98–107)
CO2 SERPL-SCNC: 25 MMOL/L — SIGNIFICANT CHANGE UP (ref 22–31)
CREAT SERPL-MCNC: 0.64 MG/DL — SIGNIFICANT CHANGE UP (ref 0.5–1.3)
EGFR: 85 ML/MIN/1.73M2 — SIGNIFICANT CHANGE UP
GLUCOSE SERPL-MCNC: 108 MG/DL — HIGH (ref 70–99)
HCT VFR BLD CALC: 34.5 % — SIGNIFICANT CHANGE UP (ref 34.5–45)
HGB BLD-MCNC: 10.7 G/DL — LOW (ref 11.5–15.5)
MAGNESIUM SERPL-MCNC: 2.5 MG/DL — SIGNIFICANT CHANGE UP (ref 1.6–2.6)
MCHC RBC-ENTMCNC: 29.6 PG — SIGNIFICANT CHANGE UP (ref 27–34)
MCHC RBC-ENTMCNC: 31 G/DL — LOW (ref 32–36)
MCV RBC AUTO: 95.6 FL — SIGNIFICANT CHANGE UP (ref 80–100)
NRBC # BLD: 0 /100 WBCS — SIGNIFICANT CHANGE UP (ref 0–0)
NRBC # FLD: 0 K/UL — SIGNIFICANT CHANGE UP (ref 0–0)
PHOSPHATE SERPL-MCNC: 2.6 MG/DL — SIGNIFICANT CHANGE UP (ref 2.5–4.5)
PLATELET # BLD AUTO: 201 K/UL — SIGNIFICANT CHANGE UP (ref 150–400)
POTASSIUM SERPL-MCNC: 4.1 MMOL/L — SIGNIFICANT CHANGE UP (ref 3.5–5.3)
POTASSIUM SERPL-SCNC: 4.1 MMOL/L — SIGNIFICANT CHANGE UP (ref 3.5–5.3)
RBC # BLD: 3.61 M/UL — LOW (ref 3.8–5.2)
RBC # FLD: 13.8 % — SIGNIFICANT CHANGE UP (ref 10.3–14.5)
SODIUM SERPL-SCNC: 142 MMOL/L — SIGNIFICANT CHANGE UP (ref 135–145)
WBC # BLD: 9.78 K/UL — SIGNIFICANT CHANGE UP (ref 3.8–10.5)
WBC # FLD AUTO: 9.78 K/UL — SIGNIFICANT CHANGE UP (ref 3.8–10.5)

## 2024-12-05 RX ORDER — HYDROMORPHONE HYDROCHLORIDE 2 MG/1
0.5 TABLET ORAL EVERY 6 HOURS
Refills: 0 | Status: DISCONTINUED | OUTPATIENT
Start: 2024-12-05 | End: 2024-12-05

## 2024-12-05 RX ORDER — HYDROMORPHONE HYDROCHLORIDE 2 MG/1
2 TABLET ORAL EVERY 4 HOURS
Refills: 0 | Status: DISCONTINUED | OUTPATIENT
Start: 2024-12-05 | End: 2024-12-06

## 2024-12-05 RX ORDER — HYDROMORPHONE HYDROCHLORIDE 2 MG/1
4 TABLET ORAL EVERY 4 HOURS
Refills: 0 | Status: DISCONTINUED | OUTPATIENT
Start: 2024-12-05 | End: 2024-12-06

## 2024-12-05 RX ORDER — HYDROMORPHONE HYDROCHLORIDE 2 MG/1
0.2 TABLET ORAL EVERY 6 HOURS
Refills: 0 | Status: DISCONTINUED | OUTPATIENT
Start: 2024-12-05 | End: 2024-12-05

## 2024-12-05 RX ADMIN — ACETAMINOPHEN 500MG 975 MILLIGRAM(S): 500 TABLET, COATED ORAL at 12:47

## 2024-12-05 RX ADMIN — SODIUM CHLORIDE 3 MILLILITER(S): 9 INJECTION, SOLUTION INTRAMUSCULAR; INTRAVENOUS; SUBCUTANEOUS at 22:30

## 2024-12-05 RX ADMIN — ACETAMINOPHEN 500MG 975 MILLIGRAM(S): 500 TABLET, COATED ORAL at 17:37

## 2024-12-05 RX ADMIN — Medication 2 GRAM(S): at 10:22

## 2024-12-05 RX ADMIN — ACETAMINOPHEN 500MG 975 MILLIGRAM(S): 500 TABLET, COATED ORAL at 05:00

## 2024-12-05 RX ADMIN — ENOXAPARIN SODIUM 40 MILLIGRAM(S): 30 INJECTION SUBCUTANEOUS at 17:37

## 2024-12-05 RX ADMIN — Medication 1000 MILLIGRAM(S): at 17:37

## 2024-12-05 RX ADMIN — Medication 2000 UNIT(S): at 11:48

## 2024-12-05 RX ADMIN — ENOXAPARIN SODIUM 40 MILLIGRAM(S): 30 INJECTION SUBCUTANEOUS at 04:57

## 2024-12-05 RX ADMIN — ONDANSETRON HYDROCHLORIDE 4 MILLIGRAM(S): 4 TABLET, FILM COATED ORAL at 01:30

## 2024-12-05 RX ADMIN — SODIUM CHLORIDE 3 MILLILITER(S): 9 INJECTION, SOLUTION INTRAMUSCULAR; INTRAVENOUS; SUBCUTANEOUS at 12:13

## 2024-12-05 RX ADMIN — Medication 1000 MILLIGRAM(S): at 04:56

## 2024-12-05 RX ADMIN — HYDROMORPHONE HYDROCHLORIDE 0.5 MILLIGRAM(S): 2 TABLET ORAL at 01:28

## 2024-12-05 RX ADMIN — CHLORHEXIDINE GLUCONATE 1 APPLICATION(S): 1.2 RINSE ORAL at 11:48

## 2024-12-05 RX ADMIN — Medication 500 MILLIGRAM(S): at 11:48

## 2024-12-05 RX ADMIN — ACETAMINOPHEN 500MG 975 MILLIGRAM(S): 500 TABLET, COATED ORAL at 11:47

## 2024-12-05 RX ADMIN — SODIUM CHLORIDE 3 MILLILITER(S): 9 INJECTION, SOLUTION INTRAMUSCULAR; INTRAVENOUS; SUBCUTANEOUS at 06:56

## 2024-12-05 RX ADMIN — Medication 50 MICROGRAM(S): at 04:58

## 2024-12-05 RX ADMIN — HYDROMORPHONE HYDROCHLORIDE 2 MILLIGRAM(S): 2 TABLET ORAL at 22:33

## 2024-12-05 RX ADMIN — ACETAMINOPHEN 500MG 975 MILLIGRAM(S): 500 TABLET, COATED ORAL at 05:30

## 2024-12-05 RX ADMIN — HYDROMORPHONE HYDROCHLORIDE 2 MILLIGRAM(S): 2 TABLET ORAL at 23:33

## 2024-12-05 RX ADMIN — HYDROMORPHONE HYDROCHLORIDE 0.5 MILLIGRAM(S): 2 TABLET ORAL at 01:45

## 2024-12-06 LAB
ANION GAP SERPL CALC-SCNC: 10 MMOL/L — SIGNIFICANT CHANGE UP (ref 7–14)
BUN SERPL-MCNC: 10 MG/DL — SIGNIFICANT CHANGE UP (ref 7–23)
CALCIUM SERPL-MCNC: 8.9 MG/DL — SIGNIFICANT CHANGE UP (ref 8.4–10.5)
CHLORIDE SERPL-SCNC: 105 MMOL/L — SIGNIFICANT CHANGE UP (ref 98–107)
CO2 SERPL-SCNC: 26 MMOL/L — SIGNIFICANT CHANGE UP (ref 22–31)
CREAT SERPL-MCNC: 0.59 MG/DL — SIGNIFICANT CHANGE UP (ref 0.5–1.3)
EGFR: 87 ML/MIN/1.73M2 — SIGNIFICANT CHANGE UP
GLUCOSE SERPL-MCNC: 146 MG/DL — HIGH (ref 70–99)
HCT VFR BLD CALC: 34.5 % — SIGNIFICANT CHANGE UP (ref 34.5–45)
HGB BLD-MCNC: 10.6 G/DL — LOW (ref 11.5–15.5)
MAGNESIUM SERPL-MCNC: 2.3 MG/DL — SIGNIFICANT CHANGE UP (ref 1.6–2.6)
MCHC RBC-ENTMCNC: 29.7 PG — SIGNIFICANT CHANGE UP (ref 27–34)
MCHC RBC-ENTMCNC: 30.7 G/DL — LOW (ref 32–36)
MCV RBC AUTO: 96.6 FL — SIGNIFICANT CHANGE UP (ref 80–100)
NRBC # BLD: 0 /100 WBCS — SIGNIFICANT CHANGE UP (ref 0–0)
NRBC # FLD: 0 K/UL — SIGNIFICANT CHANGE UP (ref 0–0)
PHOSPHATE SERPL-MCNC: 2.5 MG/DL — SIGNIFICANT CHANGE UP (ref 2.5–4.5)
PLATELET # BLD AUTO: 225 K/UL — SIGNIFICANT CHANGE UP (ref 150–400)
POTASSIUM SERPL-MCNC: 4 MMOL/L — SIGNIFICANT CHANGE UP (ref 3.5–5.3)
POTASSIUM SERPL-SCNC: 4 MMOL/L — SIGNIFICANT CHANGE UP (ref 3.5–5.3)
RBC # BLD: 3.57 M/UL — LOW (ref 3.8–5.2)
RBC # FLD: 13.7 % — SIGNIFICANT CHANGE UP (ref 10.3–14.5)
SODIUM SERPL-SCNC: 141 MMOL/L — SIGNIFICANT CHANGE UP (ref 135–145)
WBC # BLD: 8.97 K/UL — SIGNIFICANT CHANGE UP (ref 3.8–10.5)
WBC # FLD AUTO: 8.97 K/UL — SIGNIFICANT CHANGE UP (ref 3.8–10.5)

## 2024-12-06 RX ORDER — SODIUM,POTASSIUM PHOSPHATES 278-250MG
1 POWDER IN PACKET (EA) ORAL ONCE
Refills: 0 | Status: COMPLETED | OUTPATIENT
Start: 2024-12-06 | End: 2024-12-06

## 2024-12-06 RX ORDER — GUAIFENESIN/DEXTROMETHORPHAN 100-10MG/5
10 SYRUP ORAL EVERY 6 HOURS
Refills: 0 | Status: DISCONTINUED | OUTPATIENT
Start: 2024-12-06 | End: 2024-12-09

## 2024-12-06 RX ORDER — HYDROMORPHONE HYDROCHLORIDE 2 MG/1
2 TABLET ORAL EVERY 4 HOURS
Refills: 0 | Status: DISCONTINUED | OUTPATIENT
Start: 2024-12-06 | End: 2024-12-09

## 2024-12-06 RX ADMIN — Medication 2000 UNIT(S): at 11:19

## 2024-12-06 RX ADMIN — Medication 1000 MILLIGRAM(S): at 06:52

## 2024-12-06 RX ADMIN — ACETAMINOPHEN 500MG 975 MILLIGRAM(S): 500 TABLET, COATED ORAL at 06:52

## 2024-12-06 RX ADMIN — ENOXAPARIN SODIUM 40 MILLIGRAM(S): 30 INJECTION SUBCUTANEOUS at 18:22

## 2024-12-06 RX ADMIN — ACETAMINOPHEN 500MG 975 MILLIGRAM(S): 500 TABLET, COATED ORAL at 07:52

## 2024-12-06 RX ADMIN — Medication 10 MILLILITER(S): at 23:36

## 2024-12-06 RX ADMIN — CHLORHEXIDINE GLUCONATE 1 APPLICATION(S): 1.2 RINSE ORAL at 11:23

## 2024-12-06 RX ADMIN — ACETAMINOPHEN 500MG 975 MILLIGRAM(S): 500 TABLET, COATED ORAL at 11:19

## 2024-12-06 RX ADMIN — Medication 50 MICROGRAM(S): at 05:16

## 2024-12-06 RX ADMIN — Medication 1 PACKET(S): at 11:19

## 2024-12-06 RX ADMIN — SODIUM CHLORIDE 3 MILLILITER(S): 9 INJECTION, SOLUTION INTRAMUSCULAR; INTRAVENOUS; SUBCUTANEOUS at 14:00

## 2024-12-06 RX ADMIN — ACETAMINOPHEN 500MG 975 MILLIGRAM(S): 500 TABLET, COATED ORAL at 18:21

## 2024-12-06 RX ADMIN — ACETAMINOPHEN 500MG 975 MILLIGRAM(S): 500 TABLET, COATED ORAL at 23:36

## 2024-12-06 RX ADMIN — SODIUM CHLORIDE 3 MILLILITER(S): 9 INJECTION, SOLUTION INTRAMUSCULAR; INTRAVENOUS; SUBCUTANEOUS at 06:30

## 2024-12-06 RX ADMIN — ACETAMINOPHEN 500MG 975 MILLIGRAM(S): 500 TABLET, COATED ORAL at 12:19

## 2024-12-06 RX ADMIN — Medication 500 MILLIGRAM(S): at 11:20

## 2024-12-06 RX ADMIN — ENOXAPARIN SODIUM 40 MILLIGRAM(S): 30 INJECTION SUBCUTANEOUS at 06:52

## 2024-12-06 RX ADMIN — Medication 2 GRAM(S): at 11:20

## 2024-12-06 RX ADMIN — SODIUM CHLORIDE 3 MILLILITER(S): 9 INJECTION, SOLUTION INTRAMUSCULAR; INTRAVENOUS; SUBCUTANEOUS at 21:09

## 2024-12-07 LAB
ANION GAP SERPL CALC-SCNC: 8 MMOL/L — SIGNIFICANT CHANGE UP (ref 7–14)
BUN SERPL-MCNC: 12 MG/DL — SIGNIFICANT CHANGE UP (ref 7–23)
CALCIUM SERPL-MCNC: 9.2 MG/DL — SIGNIFICANT CHANGE UP (ref 8.4–10.5)
CHLORIDE SERPL-SCNC: 107 MMOL/L — SIGNIFICANT CHANGE UP (ref 98–107)
CO2 SERPL-SCNC: 28 MMOL/L — SIGNIFICANT CHANGE UP (ref 22–31)
CREAT SERPL-MCNC: 0.58 MG/DL — SIGNIFICANT CHANGE UP (ref 0.5–1.3)
EGFR: 88 ML/MIN/1.73M2 — SIGNIFICANT CHANGE UP
FLUAV AG NPH QL: SIGNIFICANT CHANGE UP
FLUBV AG NPH QL: SIGNIFICANT CHANGE UP
GLUCOSE SERPL-MCNC: 105 MG/DL — HIGH (ref 70–99)
HCT VFR BLD CALC: 33.1 % — LOW (ref 34.5–45)
HGB BLD-MCNC: 10.5 G/DL — LOW (ref 11.5–15.5)
MAGNESIUM SERPL-MCNC: 2.3 MG/DL — SIGNIFICANT CHANGE UP (ref 1.6–2.6)
MCHC RBC-ENTMCNC: 30.2 PG — SIGNIFICANT CHANGE UP (ref 27–34)
MCHC RBC-ENTMCNC: 31.7 G/DL — LOW (ref 32–36)
MCV RBC AUTO: 95.1 FL — SIGNIFICANT CHANGE UP (ref 80–100)
NRBC # BLD: 0 /100 WBCS — SIGNIFICANT CHANGE UP (ref 0–0)
NRBC # FLD: 0 K/UL — SIGNIFICANT CHANGE UP (ref 0–0)
PHOSPHATE SERPL-MCNC: 3.6 MG/DL — SIGNIFICANT CHANGE UP (ref 2.5–4.5)
PLATELET # BLD AUTO: 240 K/UL — SIGNIFICANT CHANGE UP (ref 150–400)
POTASSIUM SERPL-MCNC: 4.9 MMOL/L — SIGNIFICANT CHANGE UP (ref 3.5–5.3)
POTASSIUM SERPL-SCNC: 4.9 MMOL/L — SIGNIFICANT CHANGE UP (ref 3.5–5.3)
RBC # BLD: 3.48 M/UL — LOW (ref 3.8–5.2)
RBC # FLD: 13.5 % — SIGNIFICANT CHANGE UP (ref 10.3–14.5)
RSV RNA NPH QL NAA+NON-PROBE: SIGNIFICANT CHANGE UP
SARS-COV-2 RNA SPEC QL NAA+PROBE: SIGNIFICANT CHANGE UP
SODIUM SERPL-SCNC: 143 MMOL/L — SIGNIFICANT CHANGE UP (ref 135–145)
WBC # BLD: 7.14 K/UL — SIGNIFICANT CHANGE UP (ref 3.8–10.5)
WBC # FLD AUTO: 7.14 K/UL — SIGNIFICANT CHANGE UP (ref 3.8–10.5)

## 2024-12-07 PROCEDURE — 71045 X-RAY EXAM CHEST 1 VIEW: CPT | Mod: 26

## 2024-12-07 RX ADMIN — CHLORHEXIDINE GLUCONATE 1 APPLICATION(S): 1.2 RINSE ORAL at 11:33

## 2024-12-07 RX ADMIN — Medication 500 MILLIGRAM(S): at 11:29

## 2024-12-07 RX ADMIN — ACETAMINOPHEN 500MG 975 MILLIGRAM(S): 500 TABLET, COATED ORAL at 23:34

## 2024-12-07 RX ADMIN — Medication 10 MILLILITER(S): at 18:24

## 2024-12-07 RX ADMIN — ACETAMINOPHEN 500MG 975 MILLIGRAM(S): 500 TABLET, COATED ORAL at 18:22

## 2024-12-07 RX ADMIN — SODIUM CHLORIDE 3 MILLILITER(S): 9 INJECTION, SOLUTION INTRAMUSCULAR; INTRAVENOUS; SUBCUTANEOUS at 12:44

## 2024-12-07 RX ADMIN — Medication 2 GRAM(S): at 08:17

## 2024-12-07 RX ADMIN — SODIUM CHLORIDE 3 MILLILITER(S): 9 INJECTION, SOLUTION INTRAMUSCULAR; INTRAVENOUS; SUBCUTANEOUS at 22:17

## 2024-12-07 RX ADMIN — Medication 10 MILLILITER(S): at 12:15

## 2024-12-07 RX ADMIN — ACETAMINOPHEN 500MG 975 MILLIGRAM(S): 500 TABLET, COATED ORAL at 05:56

## 2024-12-07 RX ADMIN — ACETAMINOPHEN 500MG 975 MILLIGRAM(S): 500 TABLET, COATED ORAL at 19:22

## 2024-12-07 RX ADMIN — ACETAMINOPHEN 500MG 975 MILLIGRAM(S): 500 TABLET, COATED ORAL at 11:29

## 2024-12-07 RX ADMIN — Medication 10 MILLILITER(S): at 05:56

## 2024-12-07 RX ADMIN — Medication 2000 UNIT(S): at 11:28

## 2024-12-07 RX ADMIN — ACETAMINOPHEN 500MG 975 MILLIGRAM(S): 500 TABLET, COATED ORAL at 19:21

## 2024-12-07 RX ADMIN — ENOXAPARIN SODIUM 40 MILLIGRAM(S): 30 INJECTION SUBCUTANEOUS at 05:59

## 2024-12-07 RX ADMIN — ACETAMINOPHEN 500MG 975 MILLIGRAM(S): 500 TABLET, COATED ORAL at 06:56

## 2024-12-07 RX ADMIN — SODIUM CHLORIDE 3 MILLILITER(S): 9 INJECTION, SOLUTION INTRAMUSCULAR; INTRAVENOUS; SUBCUTANEOUS at 05:20

## 2024-12-07 RX ADMIN — ACETAMINOPHEN 500MG 975 MILLIGRAM(S): 500 TABLET, COATED ORAL at 00:36

## 2024-12-07 RX ADMIN — Medication 50 MICROGRAM(S): at 05:57

## 2024-12-07 RX ADMIN — ENOXAPARIN SODIUM 40 MILLIGRAM(S): 30 INJECTION SUBCUTANEOUS at 18:21

## 2024-12-07 NOTE — PROVIDER CONTACT NOTE (OTHER) - ACTION/TREATMENT ORDERED:
MD aware. No new orders at this time. Plan of care ongoing.
MD aware. MD going to attach a new ALLA bulb. Plan of care ongoing
MD aware. No new orders at this time. Continue to monitor. Plan of care ongoing.
MD to assess. Going to order 2 L NC. Plan of care ongoing.

## 2024-12-07 NOTE — PROVIDER CONTACT NOTE (OTHER) - ASSESSMENT
Pt A&Ox4. Pt denies SOB, chest pain, headache, and palpitations. No signs of acute distress at this time and equal chest expansion with respirations.
Pt is A&Ox4. Pt denies pain, SOB, chest pain, palpitations, and headache. ALLA drain has put out no output throughout the night when it was still intact.
Pt A&Ox4. Pt denies chest pain, SOB, pain, palpitations, and headache. Pt laying comfortably in bed with no signs of acute distress with equal chest expansion. Pt HR 71 bpm.
Pt A&ox4. Pt denies SOB, chest pain, headache, and palpitations. Pt resting comfortably in bed with equal chest expansion and no signs of acute distress. /46 and HR 73.

## 2024-12-07 NOTE — PROVIDER CONTACT NOTE (OTHER) - RECOMMENDATIONS
MD to assess.
MD to bedside to assess. New ALLA bulb to be attached.
MD to assess. 2 L NC. Continue  monitoring.
MD to assess.

## 2024-12-07 NOTE — PROVIDER CONTACT NOTE (OTHER) - BACKGROUND
Pt is s/p right hemicolectomy and ventral hernia repair.
Pt s/p right hemicolectomy and ventral hernia repair.
Pt s/p right hemicolectomy and ventral hernia repair.
Pt s/p hemicolectomy and ventral hernia repair.

## 2024-12-08 LAB
ANION GAP SERPL CALC-SCNC: 11 MMOL/L — SIGNIFICANT CHANGE UP (ref 7–14)
BUN SERPL-MCNC: 10 MG/DL — SIGNIFICANT CHANGE UP (ref 7–23)
CALCIUM SERPL-MCNC: 9.3 MG/DL — SIGNIFICANT CHANGE UP (ref 8.4–10.5)
CHLORIDE SERPL-SCNC: 103 MMOL/L — SIGNIFICANT CHANGE UP (ref 98–107)
CO2 SERPL-SCNC: 25 MMOL/L — SIGNIFICANT CHANGE UP (ref 22–31)
CREAT SERPL-MCNC: 0.55 MG/DL — SIGNIFICANT CHANGE UP (ref 0.5–1.3)
EGFR: 89 ML/MIN/1.73M2 — SIGNIFICANT CHANGE UP
GLUCOSE SERPL-MCNC: 109 MG/DL — HIGH (ref 70–99)
HCT VFR BLD CALC: 34.1 % — LOW (ref 34.5–45)
HGB BLD-MCNC: 11.1 G/DL — LOW (ref 11.5–15.5)
MAGNESIUM SERPL-MCNC: 2.2 MG/DL — SIGNIFICANT CHANGE UP (ref 1.6–2.6)
MCHC RBC-ENTMCNC: 30.6 PG — SIGNIFICANT CHANGE UP (ref 27–34)
MCHC RBC-ENTMCNC: 32.6 G/DL — SIGNIFICANT CHANGE UP (ref 32–36)
MCV RBC AUTO: 93.9 FL — SIGNIFICANT CHANGE UP (ref 80–100)
NRBC # BLD: 0 /100 WBCS — SIGNIFICANT CHANGE UP (ref 0–0)
NRBC # FLD: 0 K/UL — SIGNIFICANT CHANGE UP (ref 0–0)
PHOSPHATE SERPL-MCNC: 3.1 MG/DL — SIGNIFICANT CHANGE UP (ref 2.5–4.5)
PLATELET # BLD AUTO: 291 K/UL — SIGNIFICANT CHANGE UP (ref 150–400)
POTASSIUM SERPL-MCNC: 4.6 MMOL/L — SIGNIFICANT CHANGE UP (ref 3.5–5.3)
POTASSIUM SERPL-SCNC: 4.6 MMOL/L — SIGNIFICANT CHANGE UP (ref 3.5–5.3)
RBC # BLD: 3.63 M/UL — LOW (ref 3.8–5.2)
RBC # FLD: 13.5 % — SIGNIFICANT CHANGE UP (ref 10.3–14.5)
SODIUM SERPL-SCNC: 139 MMOL/L — SIGNIFICANT CHANGE UP (ref 135–145)
WBC # BLD: 6.75 K/UL — SIGNIFICANT CHANGE UP (ref 3.8–10.5)
WBC # FLD AUTO: 6.75 K/UL — SIGNIFICANT CHANGE UP (ref 3.8–10.5)

## 2024-12-08 PROCEDURE — 93010 ELECTROCARDIOGRAM REPORT: CPT

## 2024-12-08 RX ADMIN — ACETAMINOPHEN 500MG 975 MILLIGRAM(S): 500 TABLET, COATED ORAL at 06:15

## 2024-12-08 RX ADMIN — Medication 10 MILLILITER(S): at 02:00

## 2024-12-08 RX ADMIN — Medication 10 MILLILITER(S): at 21:58

## 2024-12-08 RX ADMIN — ACETAMINOPHEN 500MG 975 MILLIGRAM(S): 500 TABLET, COATED ORAL at 00:34

## 2024-12-08 RX ADMIN — Medication 2000 UNIT(S): at 13:11

## 2024-12-08 RX ADMIN — Medication 2 GRAM(S): at 09:02

## 2024-12-08 RX ADMIN — Medication 50 MICROGRAM(S): at 05:13

## 2024-12-08 RX ADMIN — SODIUM CHLORIDE 3 MILLILITER(S): 9 INJECTION, SOLUTION INTRAMUSCULAR; INTRAVENOUS; SUBCUTANEOUS at 13:31

## 2024-12-08 RX ADMIN — HYDROMORPHONE HYDROCHLORIDE 2 MILLIGRAM(S): 2 TABLET ORAL at 15:56

## 2024-12-08 RX ADMIN — Medication 500 MILLIGRAM(S): at 13:10

## 2024-12-08 RX ADMIN — Medication 10 MILLILITER(S): at 13:10

## 2024-12-08 RX ADMIN — HYDROMORPHONE HYDROCHLORIDE 2 MILLIGRAM(S): 2 TABLET ORAL at 14:56

## 2024-12-08 RX ADMIN — ENOXAPARIN SODIUM 40 MILLIGRAM(S): 30 INJECTION SUBCUTANEOUS at 05:13

## 2024-12-08 RX ADMIN — SODIUM CHLORIDE 3 MILLILITER(S): 9 INJECTION, SOLUTION INTRAMUSCULAR; INTRAVENOUS; SUBCUTANEOUS at 05:48

## 2024-12-08 RX ADMIN — ACETAMINOPHEN 500MG 975 MILLIGRAM(S): 500 TABLET, COATED ORAL at 05:13

## 2024-12-08 RX ADMIN — ACETAMINOPHEN 500MG 975 MILLIGRAM(S): 500 TABLET, COATED ORAL at 13:09

## 2024-12-08 RX ADMIN — SODIUM CHLORIDE 3 MILLILITER(S): 9 INJECTION, SOLUTION INTRAMUSCULAR; INTRAVENOUS; SUBCUTANEOUS at 21:03

## 2024-12-08 RX ADMIN — ACETAMINOPHEN 500MG 975 MILLIGRAM(S): 500 TABLET, COATED ORAL at 14:09

## 2024-12-08 RX ADMIN — ENOXAPARIN SODIUM 40 MILLIGRAM(S): 30 INJECTION SUBCUTANEOUS at 18:08

## 2024-12-09 ENCOUNTER — TRANSCRIPTION ENCOUNTER (OUTPATIENT)
Age: 89
End: 2024-12-09

## 2024-12-09 VITALS — WEIGHT: 235.89 LBS

## 2024-12-09 LAB
ANION GAP SERPL CALC-SCNC: 10 MMOL/L — SIGNIFICANT CHANGE UP (ref 7–14)
BUN SERPL-MCNC: 14 MG/DL — SIGNIFICANT CHANGE UP (ref 7–23)
CALCIUM SERPL-MCNC: 9.5 MG/DL — SIGNIFICANT CHANGE UP (ref 8.4–10.5)
CHLORIDE SERPL-SCNC: 104 MMOL/L — SIGNIFICANT CHANGE UP (ref 98–107)
CO2 SERPL-SCNC: 26 MMOL/L — SIGNIFICANT CHANGE UP (ref 22–31)
CREAT SERPL-MCNC: 0.56 MG/DL — SIGNIFICANT CHANGE UP (ref 0.5–1.3)
EGFR: 88 ML/MIN/1.73M2 — SIGNIFICANT CHANGE UP
GLUCOSE SERPL-MCNC: 110 MG/DL — HIGH (ref 70–99)
HCT VFR BLD CALC: 35.3 % — SIGNIFICANT CHANGE UP (ref 34.5–45)
HGB BLD-MCNC: 10.9 G/DL — LOW (ref 11.5–15.5)
MAGNESIUM SERPL-MCNC: 2.1 MG/DL — SIGNIFICANT CHANGE UP (ref 1.6–2.6)
MCHC RBC-ENTMCNC: 29.2 PG — SIGNIFICANT CHANGE UP (ref 27–34)
MCHC RBC-ENTMCNC: 30.9 G/DL — LOW (ref 32–36)
MCV RBC AUTO: 94.6 FL — SIGNIFICANT CHANGE UP (ref 80–100)
NRBC # BLD: 0 /100 WBCS — SIGNIFICANT CHANGE UP (ref 0–0)
NRBC # FLD: 0 K/UL — SIGNIFICANT CHANGE UP (ref 0–0)
PHOSPHATE SERPL-MCNC: 4.4 MG/DL — SIGNIFICANT CHANGE UP (ref 2.5–4.5)
PLATELET # BLD AUTO: 264 K/UL — SIGNIFICANT CHANGE UP (ref 150–400)
POTASSIUM SERPL-MCNC: 4.7 MMOL/L — SIGNIFICANT CHANGE UP (ref 3.5–5.3)
POTASSIUM SERPL-SCNC: 4.7 MMOL/L — SIGNIFICANT CHANGE UP (ref 3.5–5.3)
RBC # BLD: 3.73 M/UL — LOW (ref 3.8–5.2)
RBC # FLD: 13.7 % — SIGNIFICANT CHANGE UP (ref 10.3–14.5)
SODIUM SERPL-SCNC: 140 MMOL/L — SIGNIFICANT CHANGE UP (ref 135–145)
WBC # BLD: 7.25 K/UL — SIGNIFICANT CHANGE UP (ref 3.8–10.5)
WBC # FLD AUTO: 7.25 K/UL — SIGNIFICANT CHANGE UP (ref 3.8–10.5)

## 2024-12-09 RX ORDER — ACETAMINOPHEN, DIPHENHYDRAMINE HCL, PHENYLEPHRINE HCL 325; 25; 5 MG/1; MG/1; MG/1
3 TABLET ORAL AT BEDTIME
Refills: 0 | Status: DISCONTINUED | OUTPATIENT
Start: 2024-12-09 | End: 2024-12-09

## 2024-12-09 RX ORDER — HYDROMORPHONE HYDROCHLORIDE 2 MG/1
1 TABLET ORAL
Qty: 0 | Refills: 0 | DISCHARGE
Start: 2024-12-09

## 2024-12-09 RX ORDER — ACETAMINOPHEN, DIPHENHYDRAMINE HCL, PHENYLEPHRINE HCL 325; 25; 5 MG/1; MG/1; MG/1
3 TABLET ORAL ONCE
Refills: 0 | Status: COMPLETED | OUTPATIENT
Start: 2024-12-09 | End: 2024-12-09

## 2024-12-09 RX ORDER — ACETAMINOPHEN 500MG 500 MG/1
3 TABLET, COATED ORAL
Qty: 0 | Refills: 0 | DISCHARGE
Start: 2024-12-09

## 2024-12-09 RX ADMIN — ACETAMINOPHEN, DIPHENHYDRAMINE HCL, PHENYLEPHRINE HCL 3 MILLIGRAM(S): 325; 25; 5 TABLET ORAL at 02:52

## 2024-12-09 RX ADMIN — Medication 2 GRAM(S): at 09:48

## 2024-12-09 RX ADMIN — ACETAMINOPHEN 500MG 975 MILLIGRAM(S): 500 TABLET, COATED ORAL at 01:35

## 2024-12-09 RX ADMIN — ENOXAPARIN SODIUM 40 MILLIGRAM(S): 30 INJECTION SUBCUTANEOUS at 05:19

## 2024-12-09 RX ADMIN — SODIUM CHLORIDE 3 MILLILITER(S): 9 INJECTION, SOLUTION INTRAMUSCULAR; INTRAVENOUS; SUBCUTANEOUS at 05:40

## 2024-12-09 RX ADMIN — ACETAMINOPHEN 500MG 975 MILLIGRAM(S): 500 TABLET, COATED ORAL at 00:35

## 2024-12-09 RX ADMIN — ACETAMINOPHEN 500MG 975 MILLIGRAM(S): 500 TABLET, COATED ORAL at 06:19

## 2024-12-09 RX ADMIN — Medication 2000 UNIT(S): at 12:08

## 2024-12-09 RX ADMIN — Medication 500 MILLIGRAM(S): at 12:08

## 2024-12-09 RX ADMIN — ACETAMINOPHEN 500MG 975 MILLIGRAM(S): 500 TABLET, COATED ORAL at 13:07

## 2024-12-09 RX ADMIN — Medication 10 MILLILITER(S): at 12:10

## 2024-12-09 RX ADMIN — ACETAMINOPHEN 500MG 975 MILLIGRAM(S): 500 TABLET, COATED ORAL at 05:19

## 2024-12-09 RX ADMIN — ACETAMINOPHEN 500MG 975 MILLIGRAM(S): 500 TABLET, COATED ORAL at 12:07

## 2024-12-09 RX ADMIN — Medication 50 MICROGRAM(S): at 05:19

## 2024-12-09 NOTE — PROGRESS NOTE ADULT - ASSESSMENT
89F with PMHx HTN, PAD (LE Stent), hypothyroidism, cataracts s/p surgery (2021),  endometrial cancer s/p hysterectomy (2009), lung metastases s/p L upper lobectomy (2016, Dr. Lee Cantor), and cecal mass not amenable to endoscopic resection, now s/p robotic converted to open R hemicolectomy and ventral hernia repair (Dr. Sue, 12/2/24), recovering appropriately     Plan:   - LFD  - multimodal pain control  - MagOx  - Encourage OOBAT   - VTE ppx  - PT rec'd SARINA.    A team Surgery  [id number]
89F with PMHx HTN, PAD (LE Stent), hypothyroidism, cataracts s/p surgery (2021),  endometrial cancer s/p hysterectomy (2009), lung metastases s/p L upper lobectomy (2016, Dr. Lee Cantor), and cecal mass not amenable to endoscopic resection, now s/p robotic converted to open R hemicolectomy and ventral hernia repair (Dr. Sue, 12/2/24), recovering appropriately     Plan:   - LFD  - multimodal pain control  - d/c MagOx  - Encourage OOBAT   - VTE ppx  - PT rec'd SARINA 12/5.  - Dispo planning pending SARINA placement.    A team Surgery  [id number]
This is a 89F with PMHx HTN, PAD (LE Stent), hypothyroidism, cataracts s/p surgery (2021),  endometrial cancer s/p hysterectomy (2009), lung metastases s/p L upper lobectomy (2016, Dr. Lee Cantor), and cecal mass not amenable to endoscopic resection, now s/p robotic converted to open R hemicolectomy and ventral hernia repair (Dr. Sue, 12/2/24), recovering appropriately     Plan:   - LFD  - ERP  - MagOx  - OOBAT, PT c/s  - VTE ppx  - multimodal pain control    
89F with PMHx HTN, PAD (LE Stent), hypothyroidism, cataracts s/p surgery (2021),  endometrial cancer s/p hysterectomy (2009), lung metastases s/p L upper lobectomy (2016, Dr. Lee Cantor), and cecal mass not amenable to endoscopic resection, now s/p robotic converted to open R hemicolectomy and ventral hernia repair (Dr. Sue, 12/2/24), recovering appropriately     Plan:   - LFD  - multimodal pain control  - wean NC  - Encourage OOBAT   - VTE ppx  - PT rec'd SARINA 12/5.  - Dispo planning pending SARINA placement.    A team Surgery  [id number]
89y Female s/p robotic lap assisted R hemicolectomy & ventral hernia repair.     PLAN:  -DVT prophylaxis w/ SCD & lovenox.   -Monitor I&O's  -Monitor ALLA drain output  -Analgesia and antiemetics as needed  -CLD  -IVF  -Monitor overnight    A Team Surgery   [id number]
89F with PMHx HTN, PAD (LE Stent), hypothyroidism, cataracts s/p surgery (2021),  endometrial cancer s/p hysterectomy (2009), lung metastases s/p L upper lobectomy (2016, Dr. Lee Cantor), and cecal mass not amenable to endoscopic resection, now s/p robotic converted to open R hemicolectomy and ventral hernia repair (Dr. Sue, 12/2/24), recovering appropriately     Plan:   - LFD  - multimodal pain control  - wean NC  - Encourage OOBAT   - VTE ppx  - PT rec'd SARINA 12/5.  - Dispo planning pending SARINA placement.    A team Surgery  [id number]
89F with PMHx HTN, PAD (LE Stent), hypothyroidism, cataracts s/p surgery (2021),  endometrial cancer s/p hysterectomy (2009), lung metastases s/p L upper lobectomy (2016, Dr. Lee Cantor), and cecal mass not amenable to endoscopic resection, now s/p robotic converted to open R hemicolectomy and ventral hernia repair (Dr. Sue, 12/2/24), recovering appropriately     Plan:   - dc ALLA prior to discharge  - LFD  - multimodal pain control  - wean NC  - Encourage OOBAT   - VTE ppx  - PT rec'd SARINA 12/5.  - Dispo planning pending SARINA placement.    A team Surgery  [id number]
This is a 89F with PMHx HTN, PAD (LE Stent), hypothyroidism, cataracts s/p surgery (2021),  endometrial cancer s/p hysterectomy (2009), lung metastases s/p L upper lobectomy (2016, Dr. Lee Cantor), and cecal mass not amenable to endoscopic resection, now planned for robotic right hemicolectomy today with Dr. Sue.     Plan:   - OR today  - ERP  - Lovenox for VTE ppx  - home Vitamin C, Vitamin D3, Synthroid  - hold Lasix  - strict I&Os  - OOBAT    A Surgery   [id number]  
General

## 2024-12-09 NOTE — DIETITIAN INITIAL EVALUATION ADULT - REASON FOR ADMISSION
Other specified disorders of intestines  Per chart review, 89F with medical history of HTN, PAD (LE Stent), hypothyroidism, cataracts s/p surgery (2021), endometrial cancer s/p hysterectomy (2009), lung metastases s/p L upper lobectomy, and cecal mass not amenable to endoscopic resection, now s/p robotic converted to open R hemicolectomy and ventral hernia repair.

## 2024-12-09 NOTE — DISCHARGE NOTE NURSING/CASE MANAGEMENT/SOCIAL WORK - NSDCPNINST_GEN_ALL_CORE
Notify provider of signs & symptoms of infection such as fever, puss drainage at site of incision, swelling. If unable to reach MD, please go to the nearest emergency room.

## 2024-12-09 NOTE — DIETITIAN INITIAL EVALUATION ADULT - ORAL INTAKE PTA/DIET HISTORY
Patient reports good appetite and po intake PTA. NKFA. Denies any weight changes. Weight history per [facility]:   8/21/23- 98.9kg   7/18/24- 97.5kg  11/6/24- 99.8kg  now 107kg (12/3/24) with 1+ b/l leg edema. Weight gain may partly be related to edema.

## 2024-12-09 NOTE — PROGRESS NOTE ADULT - PROVIDER SPECIALTY LIST ADULT
Colorectal Surgery
Colorectal Surgery
Surgery
Anesthesia
Colorectal Surgery
Surgery
Surgery
Pain Medicine

## 2024-12-09 NOTE — DIETITIAN INITIAL EVALUATION ADULT - OTHER INFO
Patient on Low fiber diet, tolerating well. Consuming 51-75% of most meals. Patient denies any nausea/vomiting/diarrhea/constipation or difficulty chewing and swallowing. Low fiber diet recommendation reviewed. Plan d/c to rehab today.

## 2024-12-09 NOTE — DISCHARGE NOTE NURSING/CASE MANAGEMENT/SOCIAL WORK - FINANCIAL ASSISTANCE
[facility] provides services at a reduced cost to those who are determined to be eligible through [facility]’s financial assistance program. Information regarding [facility]’s financial assistance program can be found by going to [email or web address] or by calling [phone].

## 2024-12-09 NOTE — DIETITIAN INITIAL EVALUATION ADULT - PERTINENT LABORATORY DATA
12-09    140  |  104  |  14  ----------------------------<  110[H]  4.7   |  26  |  0.56    Ca    9.5      09 Dec 2024 04:41  Phos  4.4     12-09  Mg     2.10     12-09    A1C with Estimated Average Glucose Result: 6.1 % (11-26-24 @ 15:53)

## 2024-12-09 NOTE — PROGRESS NOTE ADULT - SUBJECTIVE AND OBJECTIVE BOX
A SURGERY PROGRESS NOTE ([id number])    SUBJECTIVE:  No acute events overnight. Patient denies conerns    OBJECTIVE:  Vital Signs Last 24 Hrs  T(C): 36.9 (09 Dec 2024 04:08), Max: 37 (08 Dec 2024 12:13)  T(F): 98.4 (09 Dec 2024 04:08), Max: 98.6 (08 Dec 2024 12:13)  HR: 65 (09 Dec 2024 04:08) (65 - 86)  BP: 136/57 (09 Dec 2024 04:08) (136/57 - 152/46)  BP(mean): --  RR: 18 (09 Dec 2024 04:08) (18 - 18)  SpO2: 98% (09 Dec 2024 04:08) (95% - 98%)    Parameters below as of 09 Dec 2024 04:08  Patient On (Oxygen Delivery Method): nasal cannula  O2 Flow (L/min): 2      Physical Examination:  GEN: NAD, resting quietly  NEURO: AAOx3, CN II-XII grossly intact, no focal deficits  PULM: symmetric chest rise bilaterally, no increased WOB on 2LNC  ABD: soft. TTP around incision, nondistended. ALLA x 1 ss.   EXTR: no lower extremity edema, moving all extremities    
A SURGERY PROGRESS NOTE ([id number])    SUBJECTIVE:  No acute events overnight. Patient notes feeling some dizziness/weakness associated with bowel prep, and associated nausea.    OBJECTIVE:  Vital Signs Last 24 Hrs  T(C): 36.8 (02 Dec 2024 04:20), Max: 36.8 (01 Dec 2024 09:27)  T(F): 98.2 (02 Dec 2024 04:20), Max: 98.2 (01 Dec 2024 09:27)  HR: 60 (02 Dec 2024 04:20) (60 - 81)  BP: 143/67 (02 Dec 2024 04:20) (124/47 - 145/83)  BP(mean): --  RR: 16 (02 Dec 2024 04:20) (16 - 18)  SpO2: 96% (02 Dec 2024 04:20) (93% - 99%)    Parameters below as of 02 Dec 2024 04:20  Patient On (Oxygen Delivery Method): room air        Physical Examination:  GEN: NAD, resting quietly, wearing sunglasses  NEURO: AAOx3, CN II-XII grossly intact, no focal deficits  PULM: symmetric chest rise bilaterally, no increased WOB  ABD: soft, nontender, nondistended  EXTR: mild lower extremity edema, moving all extremities    
Anesthesia Pain Management Service    SUBJECTIVE: Patient s/p spinal morphine with pain managable and no problems. Reports allergy to codeine after arterial stent was placed in 2013. She had received a dose of IV Morphine 8mg and experienced nausea, "felt like dying". It is likely the high dose was not tolerated well for opioid naive patient. Patient tolerated spinal duramorph and 3 doses of IV Dilaudid 0.25mg. Denies headache, able to move all extremities. 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  Pain Scale Score:  Refer to charted pain scores    THERAPY:    s/p spinal PF morphine yesterday.      MEDICATIONS  (STANDING):  acetaminophen     Tablet .. 975 milliGRAM(s) Oral every 6 hours  ascorbic acid 500 milliGRAM(s) Oral daily  celecoxib 400 milliGRAM(s) Oral once  chlorhexidine 2% Cloths 1 Application(s) Topical daily  cholecalciferol 2000 Unit(s) Oral daily  enoxaparin Injectable 40 milliGRAM(s) SubCutaneous every 12 hours  gabapentin 600 milliGRAM(s) Oral once  levothyroxine 50 MICROGram(s) Oral daily  magnesium oxide 1000 milliGRAM(s) Oral every 12 hours  omega-3-Acid Ethyl Esters 2 Gram(s) Oral with breakfast  sodium chloride 0.9% lock flush 3 milliLiter(s) IV Push every 8 hours    MEDICATIONS  (PRN):  HYDROmorphone  Injectable 0.2 milliGRAM(s) IV Push every 4 hours PRN Moderate Pain (4 - 6)  ondansetron Injectable 4 milliGRAM(s) IV Push every 8 hours PRN Nausea      OBJECTIVE: Patient sitting in bed.    Sedation Score:	[ x] Alert	[ ] Drowsy	[ ] Arousable	[ ] Asleep	[ ] Unresponsive    Side Effects:	[ x] None	[ ] Nausea	[ ] Vomiting	[ ] Pruritus  		  [ ] Weakness		[ ] Numbness	[ ] Other:    Vital Signs Last 24 Hrs  T(C): 36.8 (03 Dec 2024 12:25), Max: 36.8 (03 Dec 2024 12:25)  T(F): 98.2 (03 Dec 2024 12:25), Max: 98.2 (03 Dec 2024 12:25)  HR: 66 (03 Dec 2024 12:25) (62 - 77)  BP: 129/50 (03 Dec 2024 12:25) (129/50 - 175/85)  BP(mean): 87 (02 Dec 2024 22:00) (87 - 109)  RR: 17 (03 Dec 2024 12:25) (12 - 18)  SpO2: 97% (03 Dec 2024 12:25) (94% - 100%)    Parameters below as of 03 Dec 2024 12:25  Patient On (Oxygen Delivery Method): room air        ASSESSMENT/ PLAN  [x ] Patient transitioned to prn analgesics  [x] Pain management per primary service, pain service to sign off   [x]Documentation and Verification of current medications     Comments: PRN Oral/IV opioids and/or non-opioid Adjuvant analgesics to be used at this point.    Progress Note written now but Patient was seen earlier.
  TEAM SURGERY PROGRESS NOTE    POST OPERATIVE DAY #: 3 s/p ra lap r hemicolectomy, ventral hernia repair    SUBJECTIVE: Patient seen and examined at bedside on AM rounds, patient reports surgical pain. Denies n/v. Passing flatus, and having loose bm.     Vital Signs Last 24 Hrs  T(C): 36.4 (05 Dec 2024 08:47), Max: 37 (05 Dec 2024 04:04)  T(F): 97.6 (05 Dec 2024 08:47), Max: 98.6 (05 Dec 2024 04:04)  HR: 80 (05 Dec 2024 08:47) (73 - 94)  BP: 115/46 (05 Dec 2024 08:47) (115/46 - 134/59)  BP(mean): --  RR: 18 (05 Dec 2024 08:47) (18 - 18)  SpO2: 95% (05 Dec 2024 08:47) (94% - 100%)    Parameters below as of 05 Dec 2024 08:47  Patient On (Oxygen Delivery Method): nasal cannula      I&O's Detail    04 Dec 2024 07:01  -  05 Dec 2024 07:00  --------------------------------------------------------  IN:    Oral Fluid: 120 mL  Total IN: 120 mL    OUT:    Bulb (mL): 0 mL    Voided (mL): 0 mL  Total OUT: 0 mL    Total NET: 120 mL      05 Dec 2024 07:01  -  05 Dec 2024 11:59  --------------------------------------------------------  IN:    Oral Fluid: 360 mL  Total IN: 360 mL    OUT:    Bulb (mL): 0 mL    Voided (mL): 400 mL  Total OUT: 400 mL    Total NET: -40 mL        MEDICATIONS  (STANDING):  acetaminophen     Tablet .. 975 milliGRAM(s) Oral every 6 hours  ascorbic acid 500 milliGRAM(s) Oral daily  chlorhexidine 2% Cloths 1 Application(s) Topical daily  cholecalciferol 2000 Unit(s) Oral daily  enoxaparin Injectable 40 milliGRAM(s) SubCutaneous every 12 hours  gabapentin 600 milliGRAM(s) Oral once  levothyroxine 50 MICROGram(s) Oral daily  magnesium oxide 1000 milliGRAM(s) Oral every 12 hours  omega-3-Acid Ethyl Esters 2 Gram(s) Oral with breakfast  sodium chloride 0.9% lock flush 3 milliLiter(s) IV Push every 8 hours    MEDICATIONS  (PRN):  HYDROmorphone   Tablet 2 milliGRAM(s) Oral every 4 hours PRN Moderate Pain (4 - 6)  HYDROmorphone   Tablet 4 milliGRAM(s) Oral every 4 hours PRN Severe Pain (7 - 10)  ondansetron Injectable 4 milliGRAM(s) IV Push every 8 hours PRN Nausea      Physical Exam  General: A&Ox3, NAD  Respiratory: on NC this AM. unlabored breathing.  Cardiovascular: Regular rate & rhythm  Abdominal: soft. TTP. Appropriately distended. ALLA x 1 ss.   Extremities: WWP.     LABS:                        10.7   9.78  )-----------( 201      ( 05 Dec 2024 04:42 )             34.5     12-05    142  |  106  |  8   ----------------------------<  108[H]  4.1   |  25  |  0.64    Ca    8.7      05 Dec 2024 04:42  Phos  2.6     12-05  Mg     2.50     12-05        Urinalysis Basic - ( 05 Dec 2024 04:42 )    Color: x / Appearance: x / SG: x / pH: x  Gluc: 108 mg/dL / Ketone: x  / Bili: x / Urobili: x   Blood: x / Protein: x / Nitrite: x   Leuk Esterase: x / RBC: x / WBC x   Sq Epi: x / Non Sq Epi: x / Bacteria: x          Patient is a 89y Female  
  TEAM SURGERY PROGRESS NOTE    POST OPERATIVE DAY #: 4 s/p ra lap r hemicolectomy, ventral hernia repair    SUBJECTIVE: Patient seen and examined at bedside on AM rounds, patient without complaints. Tolerating LRD. Denies n/v. Passing flatus, and having loose bm.     Vital Signs Last 24 Hrs  T(C): 36.8 (06 Dec 2024 09:50), Max: 37.1 (05 Dec 2024 20:17)  T(F): 98.2 (06 Dec 2024 09:50), Max: 98.7 (05 Dec 2024 20:17)  HR: 77 (06 Dec 2024 09:50) (67 - 81)  BP: 132/37 (06 Dec 2024 09:50) (121/81 - 155/62)  BP(mean): --  RR: 18 (06 Dec 2024 09:50) (16 - 18)  SpO2: 94% (06 Dec 2024 09:50) (86% - 100%)    Parameters below as of 06 Dec 2024 09:50  Patient On (Oxygen Delivery Method): room air      I&O's Detail    05 Dec 2024 07:01  -  06 Dec 2024 07:00  --------------------------------------------------------  IN:    Oral Fluid: 1440 mL  Total IN: 1440 mL    OUT:    Bulb (mL): 6 mL    Voided (mL): 1300 mL  Total OUT: 1306 mL    Total NET: 134 mL      06 Dec 2024 07:01  -  06 Dec 2024 10:43  --------------------------------------------------------  IN:    Oral Fluid: 400 mL  Total IN: 400 mL    OUT:    Bulb (mL): 0 mL    Voided (mL): 300 mL  Total OUT: 300 mL    Total NET: 100 mL        MEDICATIONS  (STANDING):  acetaminophen     Tablet .. 975 milliGRAM(s) Oral every 6 hours  ascorbic acid 500 milliGRAM(s) Oral daily  chlorhexidine 2% Cloths 1 Application(s) Topical daily  cholecalciferol 2000 Unit(s) Oral daily  enoxaparin Injectable 40 milliGRAM(s) SubCutaneous every 12 hours  gabapentin 600 milliGRAM(s) Oral once  levothyroxine 50 MICROGram(s) Oral daily  omega-3-Acid Ethyl Esters 2 Gram(s) Oral with breakfast  sodium chloride 0.9% lock flush 3 milliLiter(s) IV Push every 8 hours    MEDICATIONS  (PRN):  HYDROmorphone   Tablet 2 milliGRAM(s) Oral every 4 hours PRN Moderate Pain (4 - 6) severe pain (7-10)  ondansetron Injectable 4 milliGRAM(s) IV Push every 8 hours PRN Nausea      Physical Exam  General: A&Ox3, NAD  Respiratory: on NC this AM. unlabored breathing.  Cardiovascular: Regular rate & rhythm  Abdominal: soft. TTP. Appropriately distended. ALLA x 1 ss.   Extremities: WWP.     LABS:                        10.6   8.97  )-----------( 225      ( 06 Dec 2024 08:11 )             34.5     12-06    141  |  105  |  10  ----------------------------<  146[H]  4.0   |  26  |  0.59    Ca    8.9      06 Dec 2024 08:11  Phos  2.5     12-06  Mg     2.30     12-06        Urinalysis Basic - ( 06 Dec 2024 08:11 )    Color: x / Appearance: x / SG: x / pH: x  Gluc: 146 mg/dL / Ketone: x  / Bili: x / Urobili: x   Blood: x / Protein: x / Nitrite: x   Leuk Esterase: x / RBC: x / WBC x   Sq Epi: x / Non Sq Epi: x / Bacteria: x          Patient is a 89y Female  
ANESTHESIA POSTOP CHECK    89y Female POSTOP DAY 1 S/P     Vital Signs Last 24 Hrs  T(C): 36.8 (03 Dec 2024 12:25), Max: 36.8 (03 Dec 2024 12:25)  T(F): 98.2 (03 Dec 2024 12:25), Max: 98.2 (03 Dec 2024 12:25)  HR: 66 (03 Dec 2024 12:25) (62 - 77)  BP: 129/50 (03 Dec 2024 12:25) (129/50 - 175/85)  BP(mean): 87 (02 Dec 2024 22:00) (87 - 109)  RR: 17 (03 Dec 2024 12:25) (12 - 18)  SpO2: 97% (03 Dec 2024 12:25) (94% - 100%)    Parameters below as of 03 Dec 2024 12:25  Patient On (Oxygen Delivery Method): room air      I&O's Summary    02 Dec 2024 07:01  -  03 Dec 2024 07:00  --------------------------------------------------------  IN: 610 mL / OUT: 1642.5 mL / NET: -1032.5 mL    03 Dec 2024 07:01  -  03 Dec 2024 14:36  --------------------------------------------------------  IN: 537 mL / OUT: 300 mL / NET: 237 mL        [X ] NO APPARENT ANESTHESIA COMPLICATIONS      Comments: 
  INTERVAL EVENTS: No acute events overnight.  SUBJECTIVE: Patient seen and examined at bedside with surgical team, patient without complaints. Denies fever, chills, CP, SOB nausea, vomiting, abdominal pain.    OBJECTIVE:    Vital Signs Last 24 Hrs  T(C): 36.5 (03 Dec 2024 04:30), Max: 36.5 (03 Dec 2024 04:30)  T(F): 97.7 (03 Dec 2024 04:30), Max: 97.7 (03 Dec 2024 04:30)  HR: 65 (03 Dec 2024 04:30) (60 - 77)  BP: 162/60 (03 Dec 2024 04:30) (123/57 - 175/85)  BP(mean): 87 (02 Dec 2024 22:00) (87 - 109)  RR: 17 (03 Dec 2024 04:30) (12 - 18)  SpO2: 97% (03 Dec 2024 04:30) (94% - 100%)    Parameters below as of 03 Dec 2024 04:30  Patient On (Oxygen Delivery Method): room air    I&O's Detail    02 Dec 2024 07:01  -  03 Dec 2024 07:00  --------------------------------------------------------  IN:    IV PiggyBack: 200 mL    Lactated Ringers: 150 mL    Oral Fluid: 260 mL  Total IN: 610 mL    OUT:    Bulb (mL): 42.5 mL    Indwelling Catheter - Urethral (mL): 1600 mL  Total OUT: 1642.5 mL    Total NET: -1032.5 mL      MEDICATIONS  (STANDING):  acetaminophen   IVPB .. 1000 milliGRAM(s) IV Intermittent every 6 hours  ascorbic acid 500 milliGRAM(s) Oral daily  celecoxib 400 milliGRAM(s) Oral once  chlorhexidine 2% Cloths 1 Application(s) Topical daily  cholecalciferol 2000 Unit(s) Oral daily  enoxaparin Injectable 40 milliGRAM(s) SubCutaneous every 12 hours  gabapentin 600 milliGRAM(s) Oral once  lactated ringers. 1000 milliLiter(s) (40 mL/Hr) IV Continuous <Continuous>  levothyroxine 50 MICROGram(s) Oral daily  omega-3-Acid Ethyl Esters 2 Gram(s) Oral with breakfast  sodium chloride 0.9% lock flush 3 milliLiter(s) IV Push every 8 hours    MEDICATIONS  (PRN):  HYDROmorphone  Injectable 0.25 milliGRAM(s) IV Push every 10 minutes PRN Moderate Pain (4 - 6)  ondansetron Injectable 4 milliGRAM(s) IV Push every 8 hours PRN Nausea      Physical Exam  Gen: NAD, AAOx3  Pulm: No respiratory distress, NC  CV: RRR, no JVD  Abd: Obese, Soft, Tender to palpation in LLQ, Slightly distended, Incisions covered with dry dressings.   Drains: ALLA x 1 draining SS output.     LABS:                        13.7   13.74 )-----------( 155      ( 03 Dec 2024 04:57 )             42.3     12-03    136  |  100  |  8   ----------------------------<  180[H]  4.1   |  20[L]  |  0.59    Ca    8.4      03 Dec 2024 04:57  Phos  3.3     12-03  Mg     1.90     12-03      PT/INR - ( 02 Dec 2024 04:23 )   PT: 11.3 sec;   INR: 0.95 ratio         PTT - ( 02 Dec 2024 04:23 )  PTT:31.6 sec    Urinalysis Basic - ( 03 Dec 2024 04:57 )    Color: x / Appearance: x / SG: x / pH: x  Gluc: 180 mg/dL / Ketone: x  / Bili: x / Urobili: x   Blood: x / Protein: x / Nitrite: x   Leuk Esterase: x / RBC: x / WBC x   Sq Epi: x / Non Sq Epi: x / Bacteria: x          
A SURGERY PROGRESS NOTE ([id number])    SUBJECTIVE:  No acute events overnight. Patient reports pain is well controlled.   Denies nausea, vomiting. Is passing gas and having bowel movements, Tolerating diet      OBJECTIVE:  Vital Signs Last 24 Hrs  T(C): 36.6 (07 Dec 2024 08:00), Max: 36.9 (06 Dec 2024 23:42)  T(F): 97.9 (07 Dec 2024 08:00), Max: 98.4 (06 Dec 2024 23:42)  HR: 63 (07 Dec 2024 08:00) (63 - 80)  BP: 129/49 (07 Dec 2024 08:00) (129/49 - 153/46)  BP(mean): --  RR: 17 (07 Dec 2024 08:00) (17 - 20)  SpO2: 100% (07 Dec 2024 08:00) (84% - 100%)    Parameters below as of 07 Dec 2024 08:00  Patient On (Oxygen Delivery Method): nasal cannula  O2 Flow (L/min): 2      Physical Examination:  GEN: NAD, resting quietly  NEURO: AAOx3, CN II-XII grossly intact, no focal deficits  PULM: symmetric chest rise bilaterally, no increased WOB on 2LNC  ABD: soft. TTP around incision, nondistended. ALLA x 1 ss. EXTR: no lower extremity edema, moving all extremities    
A SURGERY PROGRESS NOTE ([id number])    SUBJECTIVE:  No acute events overnight. Patient reports pain is well controlled, although notes some trembling overnight which has since resolved.   Denies nausea, vomiting. Ate minimal food. Denies flatus/BM    OBJECTIVE:  Vital Signs Last 24 Hrs  T(C): 36.6 (04 Dec 2024 08:05), Max: 36.8 (03 Dec 2024 12:25)  T(F): 97.8 (04 Dec 2024 08:05), Max: 98.2 (03 Dec 2024 12:25)  HR: 107 (04 Dec 2024 08:05) (63 - 107)  BP: 138/46 (04 Dec 2024 08:05) (116/41 - 138/46)  BP(mean): --  RR: 18 (04 Dec 2024 08:05) (17 - 18)  SpO2: 94% (04 Dec 2024 08:05) (86% - 100%)    Parameters below as of 04 Dec 2024 08:05  Patient On (Oxygen Delivery Method): nasal cannula        Physical Examination:  GEN: NAD, resting quietly  NEURO: AAOx3, CN II-XII grossly intact, no focal deficits  PULM: symmetric chest rise bilaterally, no increased WOB  ABD: soft, mild incisional TTP, mild distension, incisions cdi  EXTR: no lower extremity edema, moving all extremities    
SURGERY DAILY PROGRESS NOTE    Overnight Events: No acute events overnight    SUBJECTIVE: Patient seen and evaluated on AM rounds. Pt is resting comfortably in bed with no complaints. Passing gas and having bowel movements. Tolerating diet.   Pain is adequately controlled on current regimen.  Denies fevers/chills, chest pain, dyspnea, abdominal pain, nausea, vomiting, and diarrhea    -----------------------------------------------------------------------------------------------------------------------------------------------------------------------------------------------------------  OBJECTIVE:  Vital Signs Last 24 Hrs  T(C): 36.7 (08 Dec 2024 09:03), Max: 36.8 (07 Dec 2024 11:59)  T(F): 98.1 (08 Dec 2024 09:03), Max: 98.3 (08 Dec 2024 00:09)  HR: 72 (08 Dec 2024 09:03) (67 - 72)  BP: 152/46 (08 Dec 2024 09:03) (127/46 - 152/46)  BP(mean): --  RR: 18 (08 Dec 2024 09:03) (18 - 18)  SpO2: 97% (08 Dec 2024 09:03) (93% - 100%)    Parameters below as of 08 Dec 2024 09:03  Patient On (Oxygen Delivery Method): room air      I&O's Detail    07 Dec 2024 07:01  -  08 Dec 2024 07:00  --------------------------------------------------------  IN:    Oral Fluid: 920 mL  Total IN: 920 mL    OUT:    Bulb (mL): 0.5 mL    IV PiggyBack: 0 mL    Voided (mL): 800 mL  Total OUT: 800.5 mL    Total NET: 119.5 mL      08 Dec 2024 07:01  -  08 Dec 2024 11:12  --------------------------------------------------------  IN:    Oral Fluid: 120 mL  Total IN: 120 mL    OUT:    Bulb (mL): 0 mL    Voided (mL): 300 mL  Total OUT: 300 mL    Total NET: -180 mL        Daily     Daily   MEDICATIONS  (STANDING):  acetaminophen     Tablet .. 975 milliGRAM(s) Oral every 6 hours  ascorbic acid 500 milliGRAM(s) Oral daily  chlorhexidine 2% Cloths 1 Application(s) Topical daily  cholecalciferol 2000 Unit(s) Oral daily  enoxaparin Injectable 40 milliGRAM(s) SubCutaneous every 12 hours  gabapentin 600 milliGRAM(s) Oral once  levothyroxine 50 MICROGram(s) Oral daily  omega-3-Acid Ethyl Esters 2 Gram(s) Oral with breakfast  sodium chloride 0.9% lock flush 3 milliLiter(s) IV Push every 8 hours    MEDICATIONS  (PRN):  guaifenesin/dextromethorphan Oral Liquid 10 milliLiter(s) Oral every 6 hours PRN Cough  HYDROmorphone   Tablet 2 milliGRAM(s) Oral every 4 hours PRN Moderate Pain (4 - 6) severe pain (7-10)  ondansetron Injectable 4 milliGRAM(s) IV Push every 8 hours PRN Nausea      LABS:                        11.1   6.75  )-----------( 291      ( 08 Dec 2024 05:49 )             34.1     12-08    139  |  103  |  10  ----------------------------<  109[H]  4.6   |  25  |  0.55    Ca    9.3      08 Dec 2024 05:49  Phos  3.1     12-08  Mg     2.20     12-08        Urinalysis Basic - ( 08 Dec 2024 05:49 )    Color: x / Appearance: x / SG: x / pH: x  Gluc: 109 mg/dL / Ketone: x  / Bili: x / Urobili: x   Blood: x / Protein: x / Nitrite: x   Leuk Esterase: x / RBC: x / WBC x   Sq Epi: x / Non Sq Epi: x / Bacteria: x          Physical Examination:  GEN: NAD, resting quietly  NEURO: AAOx3, CN II-XII grossly intact, no focal deficits  PULM: symmetric chest rise bilaterally, no increased WOB on 2LNC  ABD: soft. TTP around incision, nondistended. ALLA x 1 ss. EXTR: no lower extremity edema, moving all extremities

## 2024-12-09 NOTE — DIETITIAN INITIAL EVALUATION ADULT - PERTINENT MEDS FT
MEDICATIONS  (STANDING):  acetaminophen     Tablet .. 975 milliGRAM(s) Oral every 6 hours  ascorbic acid 500 milliGRAM(s) Oral daily  chlorhexidine 2% Cloths 1 Application(s) Topical daily  cholecalciferol 2000 Unit(s) Oral daily  enoxaparin Injectable 40 milliGRAM(s) SubCutaneous every 12 hours  gabapentin 600 milliGRAM(s) Oral once  levothyroxine 50 MICROGram(s) Oral daily  melatonin 3 milliGRAM(s) Oral at bedtime  omega-3-Acid Ethyl Esters 2 Gram(s) Oral with breakfast  sodium chloride 0.9% lock flush 3 milliLiter(s) IV Push every 8 hours    MEDICATIONS  (PRN):  guaifenesin/dextromethorphan Oral Liquid 10 milliLiter(s) Oral every 6 hours PRN Cough  HYDROmorphone   Tablet 2 milliGRAM(s) Oral every 4 hours PRN Moderate Pain (4 - 6) severe pain (7-10)  ondansetron Injectable 4 milliGRAM(s) IV Push every 8 hours PRN Nausea

## 2024-12-09 NOTE — DISCHARGE NOTE NURSING/CASE MANAGEMENT/SOCIAL WORK - PATIENT PORTAL LINK FT
You can access the FollowMyHealth Patient Portal offered by [facility] by registering at the following website: [email or web address]. By joining [facility]’s FollowMyHealth portal, you will also be able to view your health information using other applications (apps) compatible with our system.

## 2024-12-09 NOTE — DIETITIAN INITIAL EVALUATION ADULT - NS FNS DIET ORDER
Diet, Low Fiber:   Supplement Feeding Modality:  Oral  Ensure Enlive Cans or Servings Per Day:  1       Frequency:  Three Times a day (12-07-24 @ 14:36)

## 2024-12-09 NOTE — DIETITIAN INITIAL EVALUATION ADULT - ADD RECOMMEND
1) Monitor weights, labs, BM's, skin integrity, p.o. intake.   2) Please document % PO intake in nursing flowsheet.

## 2024-12-12 LAB — SURGICAL PATHOLOGY STUDY: SIGNIFICANT CHANGE UP

## 2024-12-17 ENCOUNTER — APPOINTMENT (OUTPATIENT)
Dept: COLORECTAL SURGERY | Facility: CLINIC | Age: 89
End: 2024-12-17
Payer: MEDICARE

## 2024-12-17 VITALS
SYSTOLIC BLOOD PRESSURE: 151 MMHG | RESPIRATION RATE: 16 BRPM | TEMPERATURE: 97.7 F | OXYGEN SATURATION: 98 % | DIASTOLIC BLOOD PRESSURE: 81 MMHG | HEART RATE: 80 BPM

## 2024-12-17 DIAGNOSIS — D49.0 NEOPLASM OF UNSPECIFIED BEHAVIOR OF DIGESTIVE SYSTEM: ICD-10-CM

## 2024-12-17 PROCEDURE — 99024 POSTOP FOLLOW-UP VISIT: CPT

## 2024-12-31 PROBLEM — K63.89 OTHER SPECIFIED DISEASES OF INTESTINE: Chronic | Status: ACTIVE | Noted: 2024-11-26

## 2025-03-14 NOTE — PHYSICAL THERAPY INITIAL EVALUATION ADULT - FOLLOWS COMMANDS/ANSWERS QUESTIONS, REHAB EVAL
Patient would like a call back to discuss lab results and can be reached at [phone].   100% of the time

## 2025-03-29 NOTE — ASU PATIENT PROFILE, ADULT - NSTOBACCONEVERSMOKERY/N_GEN_A
No
normal
Finasteride Pregnancy And Lactation Text: This medication is absolutely contraindicated during pregnancy. It is unknown if it is excreted in breast milk.

## 2025-04-02 ENCOUNTER — APPOINTMENT (OUTPATIENT)
Dept: COLORECTAL SURGERY | Facility: CLINIC | Age: 89
End: 2025-04-02
Payer: MEDICARE

## 2025-04-02 VITALS
DIASTOLIC BLOOD PRESSURE: 79 MMHG | WEIGHT: 220 LBS | BODY MASS INDEX: 40.48 KG/M2 | SYSTOLIC BLOOD PRESSURE: 168 MMHG | HEART RATE: 101 BPM | OXYGEN SATURATION: 94 % | HEIGHT: 62 IN

## 2025-04-02 DIAGNOSIS — D49.0 NEOPLASM OF UNSPECIFIED BEHAVIOR OF DIGESTIVE SYSTEM: ICD-10-CM

## 2025-04-02 PROCEDURE — 99213 OFFICE O/P EST LOW 20 MIN: CPT

## 2025-07-22 NOTE — DISCHARGE NOTE PROVIDER - PROVIDER RX CONTACT NUMBER
Patient is inquiring if any time slots later in the day are open tomorrow for her appointment.    Patient advised that the anniversary of her husbands passing was on 7/17/25 and it is hitting her very hard this year.     She is having a lot of sleep issues and is unsure if she will make it for a 9am.    Patient would like to discuss with provider.   
[phone]

## 2025-07-22 NOTE — PATIENT PROFILE ADULT - FALL HARM RISK - FACTORS NURSING JUDGEMENT
Reason for visit:  removal      Relevant information: Bladder cancer    Records/imaging/labs/orders: All records available     Pt called: No need for a call    At Rooming: stent removal set up; Blue Chemo gown, Face shield, extended cuff chemo gloves (anyone in room needs these); Large red bin with yellow liner     David Dennison  7/22/2025  3:02 PM  
Yes

## (undated) DEVICE — PACKING GAUZE IODOFORM 0.5"

## (undated) DEVICE — SUT SOFSILK 3-0 30" TIES

## (undated) DEVICE — SENSOR O2 FINGER ADULT

## (undated) DEVICE — NDL ACUJET FLEX INJ 25G 5MMX230CM

## (undated) DEVICE — DRSG TEGADERM 2.5 X 3"

## (undated) DEVICE — TROCAR SURGIQUEST AIRSEAL 12MM X 100MM

## (undated) DEVICE — POSITIONER FOAM HEAD CRADLE (PINK)

## (undated) DEVICE — GLV 7 PROTEXIS (WHITE)

## (undated) DEVICE — DRAPE SOL WARMING 44X44IN

## (undated) DEVICE — TUBING IV SET GRAVITY 3Y 100" MACRO

## (undated) DEVICE — KIT ENDO PROCEDURE CUST W/VLV

## (undated) DEVICE — ELCTR BOVIE PENCIL SMOKE EVACUATION

## (undated) DEVICE — MARKER ENDO SPOT EX

## (undated) DEVICE — FORMALIN CUPS 10% BUFFERED

## (undated) DEVICE — XI DRAPE COLUMN

## (undated) DEVICE — SUT PDS II 0 18" ENDOLOOP LIGATURE

## (undated) DEVICE — BLADE SCALPEL SAFETYLOCK #10

## (undated) DEVICE — FOLEY TRAY 16FR 5CC LF UMETER CLOSED

## (undated) DEVICE — LIGASURE IMPACT

## (undated) DEVICE — SUT PDS II 4-0 27" RB-1

## (undated) DEVICE — DRAPE LIGHT HANDLE COVER (BLUE)

## (undated) DEVICE — ELCTR BOVIE TIP BLADE INSULATED 2.75" EDGE WITH SAFETY

## (undated) DEVICE — RADIAL JAW 4 LG CAPACITY WITH NDL

## (undated) DEVICE — RETRIEVER ROTH NET PLATINUM-UNIVERSAL

## (undated) DEVICE — SOL IRR POUR NS 0.9% 1500ML

## (undated) DEVICE — NDL INJ SCLERO INTERJECT 23G

## (undated) DEVICE — ATTACHMENT DISTAL 4X13.4MM

## (undated) DEVICE — XI ARM GRASPER TIP UP FENESTRATED

## (undated) DEVICE — KIT ENDO PROCEDURE CUST 10/BX

## (undated) DEVICE — POSITIONER PURPLE ARM ONE STEP (LARGE)

## (undated) DEVICE — VENODYNE/SCD SLEEVE CALF MEDIUM

## (undated) DEVICE — XI TIP COVER

## (undated) DEVICE — TROCAR SURGIQUEST AIRSEAL 5MM X 100MM

## (undated) DEVICE — GOWN XL

## (undated) DEVICE — BASIN SET SINGLE

## (undated) DEVICE — DRSG MASTISOL

## (undated) DEVICE — DRAPE LAPAROTOMY W VELCRO CORD TABS

## (undated) DEVICE — ATTACHMENT DISTAL 4X15MM

## (undated) DEVICE — TROCAR COVIDIEN VERSAONE BLADED FIXATION 12MM STANDARD

## (undated) DEVICE — BITE BLOCK MOUTHPCW/STRAP

## (undated) DEVICE — SCOPE WARMER SEAL DISP

## (undated) DEVICE — TUBING AIRSEAL TRI-LUMEN FILTERED

## (undated) DEVICE — WARMING BLANKET LITHOTOMY PEDS

## (undated) DEVICE — SOL INJ NS 0.9% 500ML 1-PORT

## (undated) DEVICE — DRSG BENZOIN 0.6CC

## (undated) DEVICE — Device

## (undated) DEVICE — SUT VICRYL 3-0 18" SH (POP-OFF)

## (undated) DEVICE — TUBING STRYKEFLOW II SUCTION / IRRIGATOR

## (undated) DEVICE — POSITIONER PINK PAD PIGAZZI SYSTEM

## (undated) DEVICE — XI ARM CLIP APPLIER MEDIUM-LARGE

## (undated) DEVICE — SYR LUER LOK 50CC

## (undated) DEVICE — XI ARM PERMANENT CAUTERY SPATULA

## (undated) DEVICE — DRSG COMBINE 5 X 9"

## (undated) DEVICE — XI VESSEL SEALER

## (undated) DEVICE — SUT VICRYL PLUS 5-0 27" RB-1 UNDYED

## (undated) DEVICE — ELCTR GROUNDING PAD ADULT COVIDIEN

## (undated) DEVICE — VALVE ENDO SURESEAL II 0-5FR

## (undated) DEVICE — CLAMP BX HOT RAD JAW 3

## (undated) DEVICE — TUBING SMARTCAP ENDO OLYMPUS 140/160/180/190 2"

## (undated) DEVICE — GOWN XXXL

## (undated) DEVICE — DRSG CURITY GAUZE SPONGE 4 X 4" 12-PLY

## (undated) DEVICE — XI OBTURATOR OPTICAL BLADELESS 8MM

## (undated) DEVICE — PACK MINOR NO DRAPE

## (undated) DEVICE — PREP BETADINE KIT

## (undated) DEVICE — SUT SOFSILK 2-0 18" TIES

## (undated) DEVICE — GLV 7 PROTEXIS (BLUE)

## (undated) DEVICE — PACK CYSTO

## (undated) DEVICE — XI ARM FORCEP PROGRASP 8MM

## (undated) DEVICE — XI SEAL UNIVERSIAL 5-12MM

## (undated) DEVICE — SOLIDIFIER ISOLYZER 2000 CC

## (undated) DEVICE — ELCTR BOVIE TIP BLADE INSULATED 6.5" EDGE

## (undated) DEVICE — XI SHEARS HARMONIC CVD 8MM

## (undated) DEVICE — POSITIONER STRAP ARMBOARD VELCRO TS-30

## (undated) DEVICE — TUBING SMARTCAP WITH CO2 ENDO 140/160/180/190 16"

## (undated) DEVICE — LIGASURE BLUNT TIP 5MM X 37CM

## (undated) DEVICE — TRAP SUCTION POLYP ENDODYNAMIC

## (undated) DEVICE — FOR-ESU VALLEYLAB T7E15008DX: Type: DURABLE MEDICAL EQUIPMENT

## (undated) DEVICE — XI ARM FORCEP TENACULUM

## (undated) DEVICE — TUBE RECTAL 24FR

## (undated) DEVICE — LUBRICATING JELLY ONESHOT 1.25OZ

## (undated) DEVICE — CATH ELCTR GLIDE PRB 7FR

## (undated) DEVICE — TUBING OLYMPUS INSUFFLATION

## (undated) DEVICE — BLADE SCALPEL SAFETYLOCK #15

## (undated) DEVICE — SUT VICRYL 2-0 27" RB-1

## (undated) DEVICE — XI ARM NEEDLE DRIVER LARGE

## (undated) DEVICE — TUBING MEDI-VAC W MAXIGRIP CONNECTORS 1/4"X6'

## (undated) DEVICE — PLATE NESSY 170

## (undated) DEVICE — XI ARM FORCEP MARYLAND BIPOLAR

## (undated) DEVICE — MASK O2 MICROSTREAM SAMPLE LINE MED/LRG 10FT

## (undated) DEVICE — FORCEP RADIAL JAW 4 JUMBO NO NDL DISP

## (undated) DEVICE — XI ARM PERMANENT CAUTERY HOOK

## (undated) DEVICE — DRSG ALLEVYN BORDER LITE 2X2"

## (undated) DEVICE — POSITIONER FOAM EGG CRATE ULNAR 2PCS (PINK)

## (undated) DEVICE — SUT VICRYL 4-0 27" RB-1 UNDYED

## (undated) DEVICE — XI ARM DISSECTOR CURVED BIPOLAR 8MM

## (undated) DEVICE — LIFTER SYR EVERLIFT AGENT SUBMUCOSAL 5ML

## (undated) DEVICE — SNARE CAPTIVATOR RND COLD STIFF 2.4X10MM 240CM

## (undated) DEVICE — XI ARM FORCEP FENESTRATED BIPOLAR 8MM

## (undated) DEVICE — XI ARM SCISSOR MONO CURVED

## (undated) DEVICE — TUBING CANNULA SALTER LABS NASAL ADULT 7FT

## (undated) DEVICE — DRSG TELFA 3 X 8

## (undated) DEVICE — XI DRAPE ARM

## (undated) DEVICE — SNARE LOOP POLY DISP 30MM LOOP

## (undated) DEVICE — STAPLER COVIDIEN ENDO GIA XL HANDLE

## (undated) DEVICE — XI ARM CLIP APPLIER LARGE

## (undated) DEVICE — PACK ROBOTIC LIJ

## (undated) DEVICE — SNARE CAPTIVATOR II 15MM